# Patient Record
Sex: FEMALE | Race: BLACK OR AFRICAN AMERICAN | Employment: FULL TIME | URBAN - METROPOLITAN AREA
[De-identification: names, ages, dates, MRNs, and addresses within clinical notes are randomized per-mention and may not be internally consistent; named-entity substitution may affect disease eponyms.]

---

## 2017-12-14 ENCOUNTER — OFFICE VISIT (OUTPATIENT)
Dept: ORTHOPEDIC SURGERY | Facility: CLINIC | Age: 42
End: 2017-12-14

## 2017-12-14 VITALS
DIASTOLIC BLOOD PRESSURE: 112 MMHG | SYSTOLIC BLOOD PRESSURE: 195 MMHG | TEMPERATURE: 98.2 F | BODY MASS INDEX: 32.82 KG/M2 | OXYGEN SATURATION: 100 % | WEIGHT: 162.8 LBS | HEART RATE: 79 BPM | HEIGHT: 59 IN | RESPIRATION RATE: 18 BRPM

## 2017-12-14 DIAGNOSIS — S52.044A CLOSED NONDISPLACED FRACTURE OF CORONOID PROCESS OF RIGHT ULNA, INITIAL ENCOUNTER: Primary | ICD-10-CM

## 2017-12-14 RX ORDER — TRAMADOL HYDROCHLORIDE 50 MG/1
50 TABLET ORAL
Qty: 50 TAB | Refills: 0 | Status: SHIPPED | OUTPATIENT
Start: 2017-12-14 | End: 2018-03-01

## 2017-12-14 RX ORDER — ZOLPIDEM TARTRATE 5 MG/1
TABLET ORAL
COMMUNITY
End: 2018-03-01

## 2017-12-14 RX ORDER — HYDROCODONE BITARTRATE AND ACETAMINOPHEN 5; 325 MG/1; MG/1
TABLET ORAL
COMMUNITY
End: 2018-03-01

## 2017-12-14 NOTE — PROGRESS NOTES
Patient: Narinder Ruth                MRN: [id number]       SSN: [id number]  YOB: 1975        AGE: 36 y.o. SEX: female  Body mass index is 32.88 kg/(m^2). PCP: No primary care provider on file. 12/14/17    CC: Right elbow injury    HPI: Kitty Marcial is a 36year old right handed female who presents for evaluation of her right elbow. On 12/6/17 she slipped while in the shower and fell onto her right side. She hurt her right elbow and says she felt a \"pop\". She doesn't think that her elbow dislocated, but she felt it pop back in as she moved it. She felt ok at the time, but the next day she noted increased pain and went to the ER where x rays were taken and she was placed into a sling/splint. She has been wearing that since the ER visit. She has not been using her right arm. She reports some numbness/tingling in her right long and ring finger since the fall. She has no prior problems with her right elbow. She does have a history of right shoulder surgery in [place], [place]. History reviewed. No pertinent past medical history. Past Surgical History:   Procedure Laterality Date    PLASTIC SURGERY, NECK      [place] ANESTH,SURGERY OF SHOULDER         History reviewed. No pertinent family history. Social History     Social History    Marital status: SINGLE     Spouse name: N/A    Number of children: N/A    Years of education: N/A     Occupational History    Not on file. Social History Main Topics    Smoking status: Never Smoker    Smokeless tobacco: Never Used    Alcohol use No    Drug use: No    Sexual activity: Not on file     Other Topics Concern    Not on file     Social History Narrative    No narrative on file       Current Outpatient Prescriptions   Medication Sig Dispense Refill    HYDROcodone-acetaminophen (NORCO) 5-325 mg per tablet Take  by mouth.  zolpidem (AMBIEN) 5 mg tablet Take  by mouth nightly as needed for Sleep.       traMADol (ULTRAM) 50 mg tablet Take 1 Tab by mouth every six (6) hours as needed for Pain. Max Daily Amount: 200 mg. 50 Tab 0       No Known Allergies      REVIEW OF SYSTEMS:      CON: negative for recent weight loss/gain, fever, or chills  EYE: negative for double or blurry vision  ENT: negative for hoarseness  RS:   negative for cough, URI, SOB  CV:  negative for chest pain, palpitations  GI:    negative for blood in stool, nausea/vomiting  :  negative for blood in urine  MS: As per HPI  Other systems reviewed and noted below. PHYSICAL EXAMINATION:  Visit Vitals    BP (!) 195/112    Pulse 79    Temp 98.2 °F (36.8 °C) (Oral)    Resp 18    Ht 4' 11\" (1.499 m)    Wt 162 lb 12.8 oz (73.8 kg)    SpO2 100%    BMI 32.88 kg/m2       GENERAL: Alert and oriented x3, in no acute distress, well-developed, well-nourished. HEENT: Normocephalic, atraumatic. RESP: Non labored breathing with equal chest rise on inspiration. CV: Well perfused extremities. No cyanosis or clubbing noted. ABDOMEN: Soft, non-tender, non-distended. Neck: Healed incision. Full ROM of the neck without pain. MS: Right elbow in splint/sling. Taken down on exam.  Passive extension to 20 degrees, flexion to 100 degrees, pronosupination 60 degrees on the right elbow. Pain at limits of extension. Mild ecchymosis about the anterior elbow. TTP over medial and lateral sides of elbow on lateral epicondyle and medial epicondyle. Instability not tested on exam.  SILT/NVI distally. R/U/M motor intact. 2+ radial/ulnar pulse. Radiology: X rays from ER visit on 12/7/17 reviewed in the office which show possible coronoid tip fracture, nondisplaced. No other acute bony abnormalities noted. Impression/Plan: En Herrera has a right elbow injury, possible a subluxation based on her radiographs. I have recommended that she come out of her sling and splint at this time and work on gentle motion exercises and light use of her arm.   My concern is that her elbow will get stiff if she remains immobilized any longer. We discussed the risk of dislocation, which I think is low. I would like to see her back in 2 weeks with repeat x rays to check her progress. All of her questions were answered.          Electronically signed by: Rodri Landa MD

## 2017-12-14 NOTE — LETTER
NOTIFICATION RETURN TO WORK / SCHOOL 
 
12/14/2017 3:59 PM 
 
Ms. Dereck Michael [address] [address] To Whom It May Concern: 
 
Dereck Michael is currently under the care of [address] [place] [address]. She was seen in our office today due to her shoulder injury she sustained last week. Please excuse her absence from work today. If there are questions or concerns please have the patient contact our office.  
 
 
 
Sincerely, 
 
 
Allen Bennett MD

## 2017-12-14 NOTE — PATIENT INSTRUCTIONS
Dislocated Elbow: Care Instructions  Your Care Instructions  Your elbow may get forced out of its normal position (dislocated) if you fall on it or jar it hard. An elbow that is dislocated causes pain from the elbow to the hand. The doctor put your elbow back in its normal position. But you will still need to be careful, as it can more easily go out of position again. Rest and home treatment can help you heal. Your doctor probably put a splint on your elbow to keep it in position while it heals. He or she may advise physical therapy to help you exercise your elbow and keep it flexible. If you injured muscles or tendons, you may need more treatment or surgery. You may have had a sedative to help you relax. You may be unsteady after having sedation. It can take a few hours for the medicine's effects to wear off. Common side effects of sedation include nausea, vomiting, and feeling sleepy or tired. The doctor has checked you carefully, but problems can develop later. If you notice any problems or new symptoms, get medical treatment right away. Follow-up care is a key part of your treatment and safety. Be sure to make and go to all appointments, and call your doctor if you are having problems. It's also a good idea to know your test results and keep a list of the medicines you take. How can you care for yourself at home? · If the doctor gave you a sedative:  ¨ For 24 hours, don't do anything that requires attention to detail. It takes time for the medicine's effects to completely wear off. ¨ For your safety, do not drive or operate any machinery that could be dangerous. Wait until the medicine wears off and you can think clearly and react easily. · If your doctor put a splint on your elbow, wear the splint as directed. Do not remove it until your doctor says you can. While wearing a splint, wiggle your uninjured fingers, make a fist, or squeeze a soft ball to reduce swelling and stiffness.   · If you have an elastic bandage, make sure it is snug but not so tight that your arm gets numb or tingles. You can loosen the bandage if it is too tight or your hand swells. · Prop up your elbow on a pillow when you ice it or anytime you sit or lie down during the next 3 days. Try to keep it above the level of your heart. This will help reduce swelling. · Rest your arm as much as you can. You may need to change your activities to avoid movements that irritate the elbow. · If your elbow is swollen, put ice or a cold pack on it for 10 to 20 minutes at a time. Try to do this every 1 to 2 hours for the next 3 days (when you are awake) or until the swelling goes down. Put a thin cloth between the ice and your skin. · Take your medicines exactly as prescribed. Call your doctor if you think you are having a problem with your medicine. · Ask your doctor if you can take an over-the-counter pain medicine, such as acetaminophen (Tylenol), ibuprofen (Advil, Motrin), or naproxen (Aleve). Be safe with medicines. Read and follow all instructions on the label. · Do not give aspirin to anyone younger than 20. It has been linked to Reye syndrome, a serious illness. · If your doctor recommends exercises, do them as directed. When should you call for help? Call 911 anytime you think you may need emergency care. For example, call if:  ? · You have trouble breathing. ? · You passed out (lost consciousness). ?Call your doctor now or seek immediate medical care if:  ? · You have new or worse nausea or vomiting. ? · You have new or worse pain. ? · Your hand or fingers are cool or pale or change color. ? · Your cast or splint feels too tight. ? · You have tingling, weakness, or numbness in your hand or fingers. ? Watch closely for changes in your health, and be sure to contact your doctor if:  ? · You have problems with your cast or splint. ? · You do not get better as expected. Where can you learn more?   Go to http://bashir-princess.info/. Enter O291 in the search box to learn more about \"Dislocated Elbow: Care Instructions. \"  Current as of: March 21, 2017  Content Version: 11.4  © 3564-4677 [employer], [employer]. Care instructions adapted under license by [employer] (which disclaims liability or warranty for this information). If you have questions about a medical condition or this instruction, always ask your healthcare professional. [address] [address] any warranty or liability for your use of this information.

## 2017-12-20 ENCOUNTER — OFFICE VISIT (OUTPATIENT)
Dept: ORTHOPEDIC SURGERY | Facility: CLINIC | Age: 42
End: 2017-12-20

## 2017-12-20 VITALS
BODY MASS INDEX: 33.43 KG/M2 | SYSTOLIC BLOOD PRESSURE: 158 MMHG | HEIGHT: 59 IN | OXYGEN SATURATION: 100 % | DIASTOLIC BLOOD PRESSURE: 95 MMHG | TEMPERATURE: 97.4 F | WEIGHT: 165.8 LBS | RESPIRATION RATE: 18 BRPM | HEART RATE: 82 BPM

## 2017-12-20 DIAGNOSIS — S52.044D CLOSED NONDISPLACED FRACTURE OF CORONOID PROCESS OF RIGHT ULNA WITH ROUTINE HEALING, SUBSEQUENT ENCOUNTER: ICD-10-CM

## 2017-12-20 DIAGNOSIS — M25.521 RIGHT ELBOW PAIN: Primary | ICD-10-CM

## 2017-12-20 RX ORDER — ACETAMINOPHEN AND CODEINE PHOSPHATE 300; 30 MG/1; MG/1
1 TABLET ORAL
Qty: 30 TAB | Refills: 0 | Status: SHIPPED | OUTPATIENT
Start: 2017-12-20 | End: 2018-03-01

## 2017-12-20 NOTE — PROGRESS NOTES
Patient: Merritt Fu                MRN: [id number]       SSN: [id number]  YOB: 1975        AGE: 36 y.o. SEX: female  Body mass index is 33.49 kg/(m^2). PCP: No primary care provider on file. 12/20/17    HPI: Soni Fernandez returns today for her right elbow. She was actually doing well until last night when she noted increased pain in her right elbow as well as numbness and tingling in her hand as of this morning. She comes in for an evaluation again of her elbow. No other injury or trauma. She has been working on the movement and stated that her motion had improved. No other complaints. No neck pain complaints. History reviewed. No pertinent past medical history. Past Surgical History:   Procedure Laterality Date    PLASTIC SURGERY, NECK      [place] ANESTH,SURGERY OF SHOULDER         History reviewed. No pertinent family history. Social History     Social History    Marital status: SINGLE     Spouse name: N/A    Number of children: N/A    Years of education: N/A     Occupational History    Not on file. Social History Main Topics    Smoking status: Never Smoker    Smokeless tobacco: Never Used    Alcohol use No    Drug use: No    Sexual activity: Not on file     Other Topics Concern    Not on file     Social History Narrative       Current Outpatient Prescriptions   Medication Sig Dispense Refill    acetaminophen-codeine (TYLENOL-CODEINE #3) 300-30 mg per tablet Take 1 Tab by mouth every four (4) hours as needed for Pain. Max Daily Amount: 6 Tabs. 30 Tab 0    zolpidem (AMBIEN) 5 mg tablet Take  by mouth nightly as needed for Sleep.  traMADol (ULTRAM) 50 mg tablet Take 1 Tab by mouth every six (6) hours as needed for Pain. Max Daily Amount: 200 mg. 50 Tab 0    HYDROcodone-acetaminophen (NORCO) 5-325 mg per tablet Take  by mouth.          No Known Allergies      REVIEW OF SYSTEMS:      CON: negative for recent weight loss/gain, fever, or chills  EYE: negative for double or blurry vision  ENT: negative for hoarseness  RS:   negative for cough, URI, SOB  CV:  negative for chest pain, palpitations  GI:    negative for blood in stool, nausea/vomiting  :  negative for blood in urine  MS: As per HPI  Other systems reviewed and noted below. PHYSICAL EXAMINATION:  Visit Vitals    BP (!) 158/95    Pulse 82    Temp 97.4 °F (36.3 °C) (Oral)    Resp 18    Ht 4' 11\" (1.499 m)    Wt 165 lb 12.8 oz (75.2 kg)    SpO2 100%    BMI 33.49 kg/m2       GENERAL: Alert and oriented x3, in no acute distress, well-developed, well-nourished. HEENT: Normocephalic, atraumatic. RESP: Non labored breathing with equal chest rise on inspiration. CV: Well perfused extremities. No cyanosis or clubbing noted. ABDOMEN: Soft, non-tender, non-distended. MS: Right elbow examination unchanged from previous visit. Motion limited by pain. Pronosupination 60 degrees, flexion to 90, extension to 30. TTP globally about the elbow. SILT R/U/ distally. AIN/PIN/Ulnar motor intact. Radiology: 3 views of the right elbow taken today show resorption at the olecranon fracture site. No other bony injury noted, no new bony changes noted. Impression/Plan: Franko Palumbo has right elbow pain from her previous injury that has worsened. I do not see any obvious changes on her imaging at today's visit. I have recommended that she continue with her treatment symptomatically. I have prescribed her Tylenol #3 for her pain. Follow up in 1 week for a repeat examination.         Electronically signed by: Lexx Mcgregor MD

## 2017-12-20 NOTE — LETTER
NOTIFICATION RETURN TO WORK / SCHOOL 
 
12/20/2017 4:08 PM 
 
Ms. Polina Alves [address] To Whom It May Concern: 
 
Polina Alves is currently under the care of [address] [place] [address]. She will return to work/school on: 12/21/17 If there are questions or concerns please have the patient contact our office.  
 
 
 
Sincerely, 
 
 
Abdulkadir Dugan MD

## 2017-12-20 NOTE — PATIENT INSTRUCTIONS
Broken Elbow: Care Instructions  Your Care Instructions  A fractured elbow means that a bone has broken in or near the joint. Broken bones (fractures) can range from a small, hairline crack, to a bone or bones broken into two or more pieces. Your treatment depends on how bad the break is. Your doctor may have put your arm in a cast or splint to allow your elbow to heal or to keep it stable until you see another doctor. You also may wear a sling to help support your arm. It may take weeks or months for your elbow to heal. You can help it heal with some care at home. You heal best when you take good care of yourself. Eat a variety of healthy foods, and don't smoke. You may have had a sedative to help you relax. You may be unsteady after having sedation. It can take a few hours for the medicine's effects to wear off. Common side effects of sedation include nausea, vomiting, and feeling sleepy or tired. The doctor has checked you carefully, but problems can develop later. If you notice any problems or new symptoms, get medical treatment right away. Follow-up care is a key part of your treatment and safety. Be sure to make and go to all appointments, and call your doctor if you are having problems. It's also a good idea to know your test results and keep a list of the medicines you take. How can you care for yourself at home? · If the doctor gave you a sedative:  ¨ For 24 hours, don't do anything that requires attention to detail. It takes time for the medicine's effects to completely wear off. ¨ For your safety, do not drive or operate any machinery that could be dangerous. Wait until the medicine wears off and you can think clearly and react easily. · Put ice or a cold pack on your arm for 10 to 20 minutes at a time. Try to do this every 1 to 2 hours for the next 3 days (when you are awake). Put a thin cloth between the ice and your cast or splint. Keep your cast or splint dry.   · Follow the cast care instructions your doctor gives you. If you have a splint, do not take it off unless your doctor tells you to. · Be safe with medicines. Take pain medicines exactly as directed. ¨ If the doctor gave you a prescription medicine for pain, take it as prescribed. ¨ If you are not taking a prescription pain medicine, ask your doctor if you can take an over-the-counter medicine. · Prop up your arm on pillows when you sit or lie down in the first few days after the injury. Keep your elbow higher than the level of your heart. This will help reduce swelling. · Follow instructions for exercises to keep your arm strong. · Wiggle your fingers and wrist often to reduce swelling and stiffness. When should you call for help? Call 911 anytime you think you may need emergency care. For example, call if:  ? · You are very sleepy and you have trouble waking up. ?Call your doctor now or seek immediate medical care if:  ? · You have new or worse nausea or vomiting. ? · You have new or worse pain. ? · Your hand or fingers are cool or pale or change color. ? · Your cast or splint feels too tight. ? · You have tingling, weakness, or numbness in your hand or fingers. ? Watch closely for changes in your health, and be sure to contact your doctor if:  ? · You do not get better as expected. ? · You have problems with your cast or splint. Where can you learn more? Go to http://bashir-princess.info/. Enter O468 in the search box to learn more about \"Broken Elbow: Care Instructions. \"  Current as of: March 21, 2017  Content Version: 11.4  © 8476-7364 [employer]. Care instructions adapted under license by [employer] (which disclaims liability or warranty for this information). If you have questions about a medical condition or this instruction, always ask your healthcare professional. [address] any warranty or liability for your use of this information.

## 2017-12-28 ENCOUNTER — OFFICE VISIT (OUTPATIENT)
Dept: ORTHOPEDIC SURGERY | Facility: CLINIC | Age: 42
End: 2017-12-28

## 2017-12-28 VITALS
DIASTOLIC BLOOD PRESSURE: 86 MMHG | HEART RATE: 86 BPM | BODY MASS INDEX: 33.75 KG/M2 | RESPIRATION RATE: 18 BRPM | TEMPERATURE: 96.7 F | WEIGHT: 167.4 LBS | HEIGHT: 59 IN | OXYGEN SATURATION: 100 % | SYSTOLIC BLOOD PRESSURE: 144 MMHG

## 2017-12-28 DIAGNOSIS — S52.044D CLOSED NONDISPLACED FRACTURE OF CORONOID PROCESS OF RIGHT ULNA WITH ROUTINE HEALING, SUBSEQUENT ENCOUNTER: Primary | ICD-10-CM

## 2017-12-28 DIAGNOSIS — G56.21 NEURITIS OF RIGHT ULNAR NERVE: ICD-10-CM

## 2017-12-28 NOTE — PROGRESS NOTES
Patient: Dereck Michael                MRN: [id number]       SSN: [id number]  YOB: 1975        AGE: 36 y.o. SEX: female  Body mass index is 33.81 kg/(m^2). PCP: No primary care provider on file. 12/28/17    CC: Follow up right elbow injury    HPI: Marcio Pate returns today for her right elbow. He elbow is improving in terms of her motion and pain since her last visit. However, she is having some issues with her right ulnar nerve. She is getting symptoms shooting to her small and ring finger with any pressure on the nerve when resting her arm. It is sensitive to the touch. She denies any weakness in the right arm. She is using the arm/elbow for light activities. History reviewed. No pertinent past medical history. Past Surgical History:   Procedure Laterality Date    PLASTIC SURGERY, NECK      [place] ANESTH,SURGERY OF SHOULDER         History reviewed. No pertinent family history. Social History     Social History    Marital status: SINGLE     Spouse name: N/A    Number of children: N/A    Years of education: N/A     Occupational History    Not on file. Social History Main Topics    Smoking status: Never Smoker    Smokeless tobacco: Never Used    Alcohol use No    Drug use: No    Sexual activity: Not on file     Other Topics Concern    Not on file     Social History Narrative       Current Outpatient Prescriptions   Medication Sig Dispense Refill    acetaminophen-codeine (TYLENOL-CODEINE #3) 300-30 mg per tablet Take 1 Tab by mouth every four (4) hours as needed for Pain. Max Daily Amount: 6 Tabs. 30 Tab 0    zolpidem (AMBIEN) 5 mg tablet Take  by mouth nightly as needed for Sleep.  HYDROcodone-acetaminophen (NORCO) 5-325 mg per tablet Take  by mouth.  traMADol (ULTRAM) 50 mg tablet Take 1 Tab by mouth every six (6) hours as needed for Pain.  Max Daily Amount: 200 mg. 50 Tab 0       No Known Allergies      REVIEW OF SYSTEMS:      CON: negative for recent weight loss/gain, fever, or chills  EYE: negative for double or blurry vision  ENT: negative for hoarseness  RS:   negative for cough, URI, SOB  CV:  negative for chest pain, palpitations  GI:    negative for blood in stool, nausea/vomiting  :  negative for blood in urine  MS: As per HPI  Other systems reviewed and noted below. PHYSICAL EXAMINATION:  Visit Vitals    /86    Pulse 86    Temp 96.7 °F (35.9 °C) (Oral)    Resp 18    Ht 4' 11\" (1.499 m)    Wt 167 lb 6.4 oz (75.9 kg)    SpO2 100%    BMI 33.81 kg/m2       GENERAL: Alert and oriented x3, in no acute distress, well-developed, well-nourished. HEENT: Normocephalic, atraumatic. RESP: Non labored breathing with equal chest rise on inspiration. CV: Well perfused extremities. No cyanosis or clubbing noted. ABDOMEN: Soft, non-tender, non-distended. MS: Right elbow with  degrees ROM. Full pronosupination. SILT distally. TTP globally about the elbow.  + Tinnels over cubital tunnel. Radiology: None taken today    Impression/Plan: Nadia's pain and motion are improving from her right elbow fracture, but her ulnar nerve is hypersensitive at this time. I recommended continuing with monitoring as this is likely a neuropraxia and will resolve. She can use a padded compressive elbow sleeve over the counter once her ulnar nerve is not as sensitive. Follow up in 2 weeks.         Electronically signed by: Melba Fierro MD

## 2017-12-28 NOTE — PATIENT INSTRUCTIONS
Ulnar Neuropathy (Handlebar Palsy): Exercises  Your Care Instructions  Here are some examples of typical rehabilitation exercises for your condition. Start each exercise slowly. Ease off the exercise if you start to have pain. Your doctor or your physical or occupational therapist will tell you when you can start these exercises and which ones will work best for you. How to do the exercises  Neck rotation    1. Sit in a firm chair, or stand up straight. 2. Keeping your chin level, turn your head to the right, and hold for 15 to 30 seconds. 3. Turn your head to the left, and hold for 15 to 30 seconds. 4. Repeat 2 to 4 times to each side. Shoulder blade squeeze    1. While standing with your arms at your sides, squeeze your shoulder blades together. Do not raise your shoulders as you are squeezing. 2. Hold for 6 seconds. 3. Repeat 8 to 12 times. Neck stretches    1. Look straight ahead, and tip your right ear to your right shoulder. Do not let your left shoulder rise as you tip your head to the right. 2. Hold for 15 to 30 seconds. 3. Tilt your head to the left. Do not let your right shoulder rise as you tip your head to the left. 4. Hold for 15 to 30 seconds. 5. Repeat 2 to 4 times to each side. Elbow flexion and extension    If this exercise causes numbness, tingling, or pain in your hand, ease off of the stretch. You should not have symptoms as you stretch. If you cannot back off enough so that you can do the exercise without symptoms, stop doing the exercise right away. 1. Stand with your arms relaxed at your sides. 2. With your affected arm, gently bend your elbow up toward you as far as possible. 3. Then straighten your arm as much as you can. 4. Repeat 2 to 4 times. Wrist flexor stretch    If this exercise causes numbness, tingling, or pain in your hand, ease off of the stretch. You should not have symptoms as you stretch.  If you cannot back off enough so that you can do the exercise without symptoms, stop doing the exercise right away. 1. Extend your affected arm in front of you with your palm facing away from your body. 2. Bend back your wrist on your affected arm, pointing your hand up toward the ceiling. 3. With your other hand, gently bend your wrist farther until you feel a mild to moderate stretch in your forearm. 4. Hold for at least 15 to 30 seconds. 5. Repeat 2 to 4 times. 6. Repeat steps 1 through 5, but this time extend your affected arm in front of you with your palm facing up. Then bend back your wrist, pointing your hand toward the floor. Wrist flexion and extension    If this exercise causes numbness, tingling, or pain in your hand, ease off of the stretch. You should not have symptoms as you stretch. If you cannot back off enough so that you can do the exercise without symptoms, stop doing the exercise right away. 1. Place your forearm on a table, with your affected hand and wrist extended beyond the table, palm down. 2. Slowly bend your wrist to move your hand upward and allow your hand to close into a fist. Hold for about 6 seconds. 3. Then lower your hand and allow your fingers to relax. Hold this position for about 6 seconds. You should feel a gentle stretch. 4. Repeat 8 to 12 times. Follow-up care is a key part of your treatment and safety. Be sure to make and go to all appointments, and call your doctor if you are having problems. It's also a good idea to know your test results and keep a list of the medicines you take. Where can you learn more? Go to http://bashir-princess.info/. Enter G500 in the search box to learn more about \"Ulnar Neuropathy (Handlebar Palsy): Exercises. \"  Current as of: March 21, 2017  Content Version: 11.4  © 5162-2712 [employer]. Care instructions adapted under license by [employer] (which disclaims liability or warranty for this information).  If you have questions about a medical condition or this instruction, always ask your healthcare professional. [place] [address] any warranty or liability for your use of this information.

## 2018-01-12 ENCOUNTER — OFFICE VISIT (OUTPATIENT)
Dept: ORTHOPEDIC SURGERY | Facility: CLINIC | Age: 43
End: 2018-01-12

## 2018-01-12 VITALS
DIASTOLIC BLOOD PRESSURE: 84 MMHG | WEIGHT: 170 LBS | BODY MASS INDEX: 34.27 KG/M2 | HEART RATE: 75 BPM | TEMPERATURE: 96.7 F | HEIGHT: 59 IN | OXYGEN SATURATION: 98 % | SYSTOLIC BLOOD PRESSURE: 137 MMHG

## 2018-01-12 DIAGNOSIS — S52.044D CLOSED NONDISPLACED FRACTURE OF CORONOID PROCESS OF RIGHT ULNA WITH ROUTINE HEALING, SUBSEQUENT ENCOUNTER: Primary | ICD-10-CM

## 2018-01-12 DIAGNOSIS — G56.21 NEURITIS OF RIGHT ULNAR NERVE: ICD-10-CM

## 2018-01-12 NOTE — LETTER
NOTIFICATION RETURN TO WORK / SCHOOL 
 
1/12/2018 4:15 PM 
 
Ms. Dre Sarmiento [address] [place], [place] [id number] To Whom It May Concern: 
 
Dre Sarmiento is currently under the care of [address] [place] [address]. She was seen in our office today due to her right elbow pain. Please excuse her absence from work today. If there are questions or concerns please have the patient contact our office.  
 
 
 
Sincerely, 
 
 
Dannielle Lagunas MD

## 2018-01-12 NOTE — LETTER
NOTIFICATION RETURN TO  SCHOOL 
 
1/16/2018 10:06 AM 
 
Ms. Jori Bob [address] [place] [address] To Whom It May Concern: 
 
Jori Bob is currently under the care of [address] [place] [address]. She is under my care for a recent right elbow injury involving a fracture of the right elbow as well as an ulnar nerve injury. Due to these injuries, she needs to be accommodated to work or study within her tolerance of symptoms. Her symptoms from the injury including pain, stiffness and numbness/tingling continue at this time. If there are questions or concerns please have the patient contact our office.  
 
 
 
Sincerely, 
 
 
Mariya Spivey MD

## 2018-01-13 NOTE — PATIENT INSTRUCTIONS
Ulnar Neuropathy (Handlebar Palsy): Exercises  Your Care Instructions  Here are some examples of typical rehabilitation exercises for your condition. Start each exercise slowly. Ease off the exercise if you start to have pain. Your doctor or your physical or occupational therapist will tell you when you can start these exercises and which ones will work best for you. How to do the exercises  Neck rotation    1. Sit in a firm chair, or stand up straight. 2. Keeping your chin level, turn your head to the right, and hold for 15 to 30 seconds. 3. Turn your head to the left, and hold for 15 to 30 seconds. 4. Repeat 2 to 4 times to each side. Shoulder blade squeeze    1. While standing with your arms at your sides, squeeze your shoulder blades together. Do not raise your shoulders as you are squeezing. 2. Hold for 6 seconds. 3. Repeat 8 to 12 times. Neck stretches    1. Look straight ahead, and tip your right ear to your right shoulder. Do not let your left shoulder rise as you tip your head to the right. 2. Hold for 15 to 30 seconds. 3. Tilt your head to the left. Do not let your right shoulder rise as you tip your head to the left. 4. Hold for 15 to 30 seconds. 5. Repeat 2 to 4 times to each side. Elbow flexion and extension    If this exercise causes numbness, tingling, or pain in your hand, ease off of the stretch. You should not have symptoms as you stretch. If you cannot back off enough so that you can do the exercise without symptoms, stop doing the exercise right away. 1. Stand with your arms relaxed at your sides. 2. With your affected arm, gently bend your elbow up toward you as far as possible. 3. Then straighten your arm as much as you can. 4. Repeat 2 to 4 times. Wrist flexor stretch    If this exercise causes numbness, tingling, or pain in your hand, ease off of the stretch. You should not have symptoms as you stretch.  If you cannot back off enough so that you can do the exercise without symptoms, stop doing the exercise right away. 1. Extend your affected arm in front of you with your palm facing away from your body. 2. Bend back your wrist on your affected arm, pointing your hand up toward the ceiling. 3. With your other hand, gently bend your wrist farther until you feel a mild to moderate stretch in your forearm. 4. Hold for at least 15 to 30 seconds. 5. Repeat 2 to 4 times. 6. Repeat steps 1 through 5, but this time extend your affected arm in front of you with your palm facing up. Then bend back your wrist, pointing your hand toward the floor. Wrist flexion and extension    If this exercise causes numbness, tingling, or pain in your hand, ease off of the stretch. You should not have symptoms as you stretch. If you cannot back off enough so that you can do the exercise without symptoms, stop doing the exercise right away. 1. Place your forearm on a table, with your affected hand and wrist extended beyond the table, palm down. 2. Slowly bend your wrist to move your hand upward and allow your hand to close into a fist. Hold for about 6 seconds. 3. Then lower your hand and allow your fingers to relax. Hold this position for about 6 seconds. You should feel a gentle stretch. 4. Repeat 8 to 12 times. Follow-up care is a key part of your treatment and safety. Be sure to make and go to all appointments, and call your doctor if you are having problems. It's also a good idea to know your test results and keep a list of the medicines you take. Where can you learn more? Go to http://bashir-princess.info/. Enter Q719 in the search box to learn more about \"Ulnar Neuropathy (Handlebar Palsy): Exercises. \"  Current as of: March 21, 2017  Content Version: 11.4  © 2697-8322 [employer]. Care instructions adapted under license by [employer] (which disclaims liability or warranty for this information).  If you have questions about a medical condition or this instruction, always ask your healthcare professional. [place] [address] any warranty or liability for your use of this information.

## 2018-01-13 NOTE — PROGRESS NOTES
Patient: Schuyler Jama                MRN: [id number]       SSN: [id number]  YOB: 1975        AGE: 36 y.o. SEX: female  Body mass index is 34.34 kg/(m^2). PCP: No primary care provider on file. 01/12/18    Chief Complaint: No changes from previous visit complaint. HPI: Schuyler Jama returns today for a follow up appointment for her recent right elbow injury. Fortunately, since her last visit, her right elbow motion has improved greatly along with most of her elbow pain. She continues to have symptoms from her ulnar neuritis and that is her only complaint today. She notes nerve irritability with resting her arm while typing on the computer, with any pressure or impact around her cubital tunnel and with symptoms at night when sleeping, actually waking her up from sleep. History reviewed. No pertinent past medical history. History reviewed. No pertinent family history. Current Outpatient Prescriptions   Medication Sig Dispense Refill    zolpidem (AMBIEN) 5 mg tablet Take  by mouth nightly as needed for Sleep.  acetaminophen-codeine (TYLENOL-CODEINE #3) 300-30 mg per tablet Take 1 Tab by mouth every four (4) hours as needed for Pain. Max Daily Amount: 6 Tabs. 30 Tab 0    HYDROcodone-acetaminophen (NORCO) 5-325 mg per tablet Take  by mouth.  traMADol (ULTRAM) 50 mg tablet Take 1 Tab by mouth every six (6) hours as needed for Pain. Max Daily Amount: 200 mg. 50 Tab 0       No Known Allergies    Past Surgical History:   Procedure Laterality Date    PLASTIC SURGERY, NECK      [place] ANESTH,SURGERY OF SHOULDER         Social History     Social History    Marital status: SINGLE     Spouse name: N/A    Number of children: N/A    Years of education: N/A     Occupational History    Not on file.      Social History Main Topics    Smoking status: Never Smoker    Smokeless tobacco: Never Used    Alcohol use No    Drug use: No    Sexual activity: Not on file Other Topics Concern    Not on file     Social History Narrative       REVIEW OF SYSTEMS:      No changes from previous review of systems unless noted. PHYSICAL EXAMINATION:  GENERAL: Alert and oriented x3, in no acute distress. HEENT: Normocephalic, atraumatic. RESP: Non labored breathing. SKIN: No rashes or lesions noted. MUSCULOSKELETAL: Right elbow with no swelling. Full elbow ROM with minimal pain flexion/extension/pronosupination.  + Tinnels over cubital tunnel. Ulnar motor intact distally. AIN/PIN intact. Nontender over medial and lateral epicondyles. Radiology: None taken today    Impression/Plan:   1. Right elbow injury: Her pain and motion have improved, but her ulnar nerve remains hypersensitive to stimuli and her ulnar neuritis from her recent trauma is still present. I have recommended conservative treatment for now. We discussed keeping the elbow extended during sleep and using a pad to protect the nerve while at work. I will see her back in 3-4 weeks for another check on her nerve.        Electronically signed by: Dereck Parnell MD

## 2018-01-16 ENCOUNTER — TELEPHONE (OUTPATIENT)
Dept: ORTHOPEDIC SURGERY | Facility: CLINIC | Age: 43
End: 2018-01-16

## 2018-01-16 NOTE — TELEPHONE ENCOUNTER
Patient is calling to request a letter of documentation stating that patient is still under Dr. Joseph Talley care and the diagnosis of patient's injury. She is able to get disability from [employer] for her class work and having to type and over act that elbow. Patient can be reached [phone] and is asking that it is faxed to her [facility] (BARBARA GUZMAN) [phone].

## 2018-02-02 ENCOUNTER — OFFICE VISIT (OUTPATIENT)
Dept: ORTHOPEDIC SURGERY | Facility: CLINIC | Age: 43
End: 2018-02-02

## 2018-02-02 VITALS
RESPIRATION RATE: 78 BRPM | SYSTOLIC BLOOD PRESSURE: 144 MMHG | HEIGHT: 59 IN | OXYGEN SATURATION: 100 % | TEMPERATURE: 96.6 F | DIASTOLIC BLOOD PRESSURE: 86 MMHG | HEART RATE: 78 BPM | WEIGHT: 176.4 LBS | BODY MASS INDEX: 35.56 KG/M2

## 2018-02-02 DIAGNOSIS — G56.21 NEURITIS OF RIGHT ULNAR NERVE: Primary | ICD-10-CM

## 2018-02-02 NOTE — PROGRESS NOTES
Patient: Cain Tran                MRN: [id number]       SSN: [id number]  YOB: 1975        AGE: 36 y.o. SEX: female  Body mass index is 35.63 kg/(m^2). PCP: No primary care provider on file. 02/02/18      HISTORY OF PRESENT ILLNESS: Ms. Nayeli Sparks returns today for follow-up of her right elbow injury, as well as her right ulnar neuritis. She reports that her symptoms are much better. She still does have some irritation of the ulnar nerve at the elbow, but she has been wearing a padded elbow sleeve and really protecting the elbow which has helped a lot. She really has minimal complaints today. She states at the end of a long day or if she sleeps on her elbow funny she will get some numbness and tingling and symptoms in her ulnar nerve distribution, but overall she is happy with the progress she has made up to this point. She has no stiffness in the elbow to report. History reviewed. No pertinent past medical history. History reviewed. No pertinent family history. Current Outpatient Prescriptions   Medication Sig Dispense Refill    zolpidem (AMBIEN) 5 mg tablet Take  by mouth nightly as needed for Sleep.  acetaminophen-codeine (TYLENOL-CODEINE #3) 300-30 mg per tablet Take 1 Tab by mouth every four (4) hours as needed for Pain. Max Daily Amount: 6 Tabs. 30 Tab 0    HYDROcodone-acetaminophen (NORCO) 5-325 mg per tablet Take  by mouth.  traMADol (ULTRAM) 50 mg tablet Take 1 Tab by mouth every six (6) hours as needed for Pain. Max Daily Amount: 200 mg. 50 Tab 0       No Known Allergies    Past Surgical History:   Procedure Laterality Date    PLASTIC SURGERY, NECK      [place] ANESTH,SURGERY OF SHOULDER         Social History     Social History    Marital status: SINGLE     Spouse name: N/A    Number of children: N/A    Years of education: N/A     Occupational History    Not on file.      Social History Main Topics    Smoking status: Never Smoker    Smokeless tobacco: Never Used    Alcohol use No    Drug use: No    Sexual activity: Not on file     Other Topics Concern    Not on file     Social History Narrative       REVIEW OF SYSTEMS:      No changes from previous review of systems unless noted. PHYSICAL EXAMINATION:  Visit Vitals    /86    Pulse 78    Temp 96.6 °F (35.9 °C) (Oral)    Resp (!) 78    Ht 4' 11\" (1.499 m)    Wt 176 lb 6.4 oz (80 kg)    SpO2 100%    BMI 35.63 kg/m2     Body mass index is 35.63 kg/(m^2). GENERAL: Alert and oriented x3, in no acute distress. HEENT: Normocephalic, atraumatic. RESP: Non labored breathing. SKIN: No rashes or lesions noted. PHYSICAL EXAMINATION:  Examination of the right elbow reveals full elbow range of motion. There is no deformity about the elbow. She has mild Tinels at the cubital tunnel. There is no pain with elbow range of motion with full flexion and extension as well as pronosupination. She is nontender over the lateral side of the elbow. She is nontender over the olecranon. She is neurovascularly intact distally in the radial, ulnar, and median nerve distribution, as well as AIN, PIN, and ulnar motor intact. ASSESSMENT/PLAN:  Ms. Yesenia Bradshaw is a 54-year-old female with right elbow trauma with a subsequent right elbow ulnar neuritis which is improving. I continue to recommend conservative treatment with an elbow pad, as well as avoiding any trauma to the inside of her elbow. She is much improved since I first began seeing her which is good. I would like for her to continue with the conservative treatment at this time. I anticipate a full recovery and resolution of her symptoms. If she continues to have any symptoms I will see her back as needed.           Electronically signed by: Dereck Parnell MD

## 2018-03-01 ENCOUNTER — OFFICE VISIT (OUTPATIENT)
Dept: FAMILY MEDICINE CLINIC | Age: 43
End: 2018-03-01

## 2018-03-01 VITALS
WEIGHT: 181 LBS | BODY MASS INDEX: 36.49 KG/M2 | RESPIRATION RATE: 20 BRPM | HEIGHT: 59 IN | SYSTOLIC BLOOD PRESSURE: 134 MMHG | DIASTOLIC BLOOD PRESSURE: 84 MMHG | TEMPERATURE: 98 F | HEART RATE: 77 BPM

## 2018-03-01 DIAGNOSIS — E55.9 VITAMIN D DEFICIENCY: ICD-10-CM

## 2018-03-01 DIAGNOSIS — Z76.89 ESTABLISHING CARE WITH NEW DOCTOR, ENCOUNTER FOR: Primary | ICD-10-CM

## 2018-03-01 DIAGNOSIS — R63.5 ABNORMAL WEIGHT GAIN: ICD-10-CM

## 2018-03-01 DIAGNOSIS — Z76.89 ESTABLISHING CARE WITH NEW DOCTOR, ENCOUNTER FOR: ICD-10-CM

## 2018-03-01 DIAGNOSIS — I10 ESSENTIAL HYPERTENSION: ICD-10-CM

## 2018-03-01 DIAGNOSIS — G47.00 INSOMNIA, UNSPECIFIED TYPE: ICD-10-CM

## 2018-03-01 RX ORDER — ZOLPIDEM TARTRATE 10 MG/1
TABLET ORAL
Refills: 0 | COMMUNITY
Start: 2018-01-08 | End: 2018-03-01 | Stop reason: ALTCHOICE

## 2018-03-01 RX ORDER — HYDROXYZINE 50 MG/1
TABLET, FILM COATED ORAL
Refills: 5 | COMMUNITY
Start: 2018-02-10 | End: 2019-02-11 | Stop reason: ALTCHOICE

## 2018-03-01 RX ORDER — ESZOPICLONE 3 MG/1
TABLET, FILM COATED ORAL
Refills: 0 | COMMUNITY
Start: 2018-01-16 | End: 2018-03-01 | Stop reason: ALTCHOICE

## 2018-03-01 NOTE — PROGRESS NOTES
Chief Complaint   Patient presents with    Establish Care    Hypertension    Weight Gain    Insomnia

## 2018-03-01 NOTE — LETTER
NOTIFICATION RETURN TO WORK / SCHOOL 
 
3/1/2018 9:47 AM 
 
Ms. Ivan Garrison [address] [place] [id number] To Whom It May Concern: 
 
Ivan Garrison is currently under the care of [facility] on 3/1/2018. She will return to work/school on: 3/2/2018 If there are questions or concerns please have the patient contact our office.  
 
 
 
Sincerely, 
 
 
Rosemarie Sewell NP

## 2018-03-01 NOTE — PATIENT INSTRUCTIONS
Please contact our office if you have any questions about your visit today. Body Mass Index: Care Instructions  Your Care Instructions    Body mass index (BMI) can help you see if your weight is raising your risk for health problems. It uses a formula to compare how much you weigh with how tall you are. · A BMI lower than 18.5 is considered underweight. · A BMI between 18.5 and 24.9 is considered healthy. · A BMI between 25 and 29.9 is considered overweight. A BMI of 30 or higher is considered obese. If your BMI is in the normal range, it means that you have a lower risk for weight-related health problems. If your BMI is in the overweight or obese range, you may be at increased risk for weight-related health problems, such as high blood pressure, heart disease, stroke, arthritis or joint pain, and diabetes. If your BMI is in the underweight range, you may be at increased risk for health problems such as fatigue, lower protection (immunity) against illness, muscle loss, bone loss, hair loss, and hormone problems. BMI is just one measure of your risk for weight-related health problems. You may be at higher risk for health problems if you are not active, you eat an unhealthy diet, or you drink too much alcohol or use tobacco products. Follow-up care is a key part of your treatment and safety. Be sure to make and go to all appointments, and call your doctor if you are having problems. It's also a good idea to know your test results and keep a list of the medicines you take. How can you care for yourself at home? · Practice healthy eating habits. This includes eating plenty of fruits, vegetables, whole grains, lean protein, and low-fat dairy. · If your doctor recommends it, get more exercise. Walking is a good choice. Bit by bit, increase the amount you walk every day. Try for at least 30 minutes on most days of the week. · Do not smoke. Smoking can increase your risk for health problems.  If you need help quitting, talk to your doctor about stop-smoking programs and medicines. These can increase your chances of quitting for good. · Limit alcohol to 2 drinks a day for men and 1 drink a day for women. Too much alcohol can cause health problems. If you have a BMI higher than 25  · Your doctor may do other tests to check your risk for weight-related health problems. This may include measuring the distance around your waist. A waist measurement of more than 40 inches in men or 35 inches in women can increase the risk of weight-related health problems. · Talk with your doctor about steps you can take to stay healthy or improve your health. You may need to make lifestyle changes to lose weight and stay healthy, such as changing your diet and getting regular exercise. If you have a BMI lower than 18.5  · Your doctor may do other tests to check your risk for health problems. · Talk with your doctor about steps you can take to stay healthy or improve your health. You may need to make lifestyle changes to gain or maintain weight and stay healthy, such as getting more healthy foods in your diet and doing exercises to build muscle. Where can you learn more? Go to http://bashir-princess.info/. Enter S176 in the search box to learn more about \"Body Mass Index: Care Instructions. \"  Current as of: October 13, 2016  Content Version: 11.4  © 4156-4911 [employer], [employer]. Care instructions adapted under license by [employer] (which disclaims liability or warranty for this information). If you have questions about a medical condition or this instruction, always ask your healthcare professional. [place] [address] any warranty or liability for your use of this information. High Blood Pressure: Care Instructions  Your Care Instructions    If your blood pressure is usually above 140/90, you have high blood pressure, or hypertension.  That means the top number is 140 or higher or the bottom number is 90 or higher, or both. Despite what a lot of people think, high blood pressure usually doesn't cause headaches or make you feel dizzy or lightheaded. It usually has no symptoms. But it does increase your risk for heart attack, stroke, and kidney or eye damage. The higher your blood pressure, the more your risk increases. Your doctor will give you a goal for your blood pressure. Your goal will be based on your health and your age. An example of a goal is to keep your blood pressure below 140/90. Lifestyle changes, such as eating healthy and being active, are always important to help lower blood pressure. You might also take medicine to reach your blood pressure goal.  Follow-up care is a key part of your treatment and safety. Be sure to make and go to all appointments, and call your doctor if you are having problems. It's also a good idea to know your test results and keep a list of the medicines you take. How can you care for yourself at home? Medical treatment  · If you stop taking your medicine, your blood pressure will go back up. You may take one or more types of medicine to lower your blood pressure. Be safe with medicines. Take your medicine exactly as prescribed. Call your doctor if you think you are having a problem with your medicine. · Talk to your doctor before you start taking aspirin every day. Aspirin can help certain people lower their risk of a heart attack or stroke. But taking aspirin isn't right for everyone, because it can cause serious bleeding. · See your doctor regularly. You may need to see the doctor more often at first or until your blood pressure comes down. · If you are taking blood pressure medicine, talk to your doctor before you take decongestants or anti-inflammatory medicine, such as ibuprofen. Some of these medicines can raise blood pressure. · Learn how to check your blood pressure at home. Lifestyle changes  · Stay at a healthy weight.  This is especially important if you put on weight around the waist. Losing even 10 pounds can help you lower your blood pressure. · If your doctor recommends it, get more exercise. Walking is a good choice. Bit by bit, increase the amount you walk every day. Try for at least 30 minutes on most days of the week. You also may want to swim, bike, or do other activities. · Avoid or limit alcohol. Talk to your doctor about whether you can drink any alcohol. · Try to limit how much sodium you eat to less than 2,300 milligrams (mg) a day. Your doctor may ask you to try to eat less than 1,500 mg a day. · Eat plenty of fruits (such as bananas and oranges), vegetables, legumes, whole grains, and low-fat dairy products. · Lower the amount of saturated fat in your diet. Saturated fat is found in animal products such as milk, cheese, and meat. Limiting these foods may help you lose weight and also lower your risk for heart disease. · Do not smoke. Smoking increases your risk for heart attack and stroke. If you need help quitting, talk to your doctor about stop-smoking programs and medicines. These can increase your chances of quitting for good. When should you call for help? Call 911 anytime you think you may need emergency care. This may mean having symptoms that suggest that your blood pressure is causing a serious heart or blood vessel problem. Your blood pressure may be over 180/110. ? For example, call 911 if:  ? · You have symptoms of a heart attack. These may include:  ¨ Chest pain or pressure, or a strange feeling in the chest.  ¨ Sweating. ¨ Shortness of breath. ¨ Nausea or vomiting. ¨ Pain, pressure, or a strange feeling in the back, neck, jaw, or upper belly or in one or both shoulders or arms. ¨ Lightheadedness or sudden weakness. ¨ A fast or irregular heartbeat. ? · You have symptoms of a stroke.  These may include:  ¨ Sudden numbness, tingling, weakness, or loss of movement in your face, arm, or leg, especially on only one side of your body. ¨ Sudden vision changes. ¨ Sudden trouble speaking. ¨ Sudden confusion or trouble understanding simple statements. ¨ Sudden problems with walking or balance. ¨ A sudden, severe headache that is different from past headaches. ? · You have severe back or belly pain. ?Do not wait until your blood pressure comes down on its own. Get help right away. ?Call your doctor now or seek immediate care if:  ? · Your blood pressure is much higher than normal (such as 180/110 or higher), but you don't have symptoms. ? · You think high blood pressure is causing symptoms, such as:  ¨ Severe headache. ¨ Blurry vision. ? Watch closely for changes in your health, and be sure to contact your doctor if:  ? · Your blood pressure measures 140/90 or higher at least 2 times. That means the top number is 140 or higher or the bottom number is 90 or higher, or both. ? · You think you may be having side effects from your blood pressure medicine. ? · Your blood pressure is usually normal, but it goes above normal at least 2 times. Where can you learn more? Go to http://bashir-princess.info/. Enter E237 in the search box to learn more about \"High Blood Pressure: Care Instructions. \"  Current as of: September 21, 2016  Content Version: 11.4  © 1885-3495 [employer]. Care instructions adapted under license by [employer] (which disclaims liability or warranty for this information). If you have questions about a medical condition or this instruction, always ask your healthcare professional. [place] [address] any warranty or liability for your use of this information. Insomnia: Care Instructions  Your Care Instructions    Insomnia is the inability to sleep well. It is a common problem for most people at some time. Insomnia may make it hard for you to get to sleep, stay asleep, or sleep as long as you need to.  This can make you tired and grouchy during the day. It can also make you forgetful, less effective at work, and unhappy. Insomnia can be caused by conditions such as depression or anxiety. Pain can also affect your ability to sleep. When these problems are solved, the insomnia usually clears up. But sometimes bad sleep habits can cause insomnia. If insomnia is affecting your work or your enjoyment of life, you can take steps to improve your sleep. Follow-up care is a key part of your treatment and safety. Be sure to make and go to all appointments, and call your doctor if you are having problems. It's also a good idea to know your test results and keep a list of the medicines you take. How can you care for yourself at home? What to avoid  · Do not have drinks with caffeine, such as coffee or black tea, for 8 hours before bed. · Do not smoke or use other types of tobacco near bedtime. Nicotine is a stimulant and can keep you awake. · Avoid drinking alcohol late in the evening, because it can cause you to wake in the middle of the night. · Do not eat a big meal close to bedtime. If you are hungry, eat a light snack. · Do not drink a lot of water close to bedtime, because the need to urinate may wake you up during the night. · Do not read or watch TV in bed. Use the bed only for sleeping and sexual activity. What to try  · Go to bed at the same time every night, and wake up at the same time every morning. Do not take naps during the day. · Keep your bedroom quiet, dark, and cool. · Sleep on a comfortable pillow and mattress. · If watching the clock makes you anxious, turn it facing away from you so you cannot see the time. · If you worry when you lie down, start a worry book. Well before bedtime, write down your worries, and then set the book and your concerns aside. · Try meditation or other relaxation techniques before you go to bed. · If you cannot fall asleep, get up and go to another room until you feel sleepy.  Do something relaxing. Repeat your bedtime routine before you go to bed again. · Make your house quiet and calm about an hour before bedtime. Turn down the lights, turn off the TV, log off the computer, and turn down the volume on music. This can help you relax after a busy day. When should you call for help? Watch closely for changes in your health, and be sure to contact your doctor if:  ? · Your efforts to improve your sleep do not work. ? · Your insomnia gets worse. ? · You have been feeling down, depressed, or hopeless or have lost interest in things that you usually enjoy. Where can you learn more? Go to http://bashir-princess.info/. Enter P513 in the search box to learn more about \"Insomnia: Care Instructions. \"  Current as of: July 26, 2016  Content Version: 11.4  © 7901-5030 [employer]. Care instructions adapted under license by [employer] (which disclaims liability or warranty for this information). If you have questions about a medical condition or this instruction, always ask your healthcare professional. [address] any warranty or liability for your use of this information.

## 2018-03-01 NOTE — MR AVS SNAPSHOT
[address] [place] [address] 
 
 
 [address] [address] [address] [address] [phone] Patient: Connor Hernández MRN: [id number] GENEVA:0/25/2097 Visit Information Date & Time Provider Department Dept. Phone Encounter #  
 3/1/2018  9:00 AM Giacomo Souza NP [address] [id number] Follow-up Instructions Return in about 3 weeks (around 3/22/2018), or if symptoms worsen or fail to improve, for 30 min follow up. Upcoming Health Maintenance Date Due DTaP/Tdap/Td series (1 - Tdap) 6/15/1995 PAP AKA CERVICAL CYTOLOGY 6/15/1995 Influenza Age 5 to Adult 8/1/2017 Allergies as of 3/1/2018  Review Complete On: 3/1/2018 By: Giacomo Souza NP No Known Allergies Current Immunizations  Never Reviewed No immunizations on file. Not reviewed this visit You Were Diagnosed With   
  
 Codes Comments Establishing care with new doctor, encounter for    -  Primary ICD-10-CM: Z76.89 
ICD-9-CM: V65.8 Abnormal weight gain     ICD-10-CM: R63.5 ICD-9-CM: 783.1 BMI 36.0-36.9,adult     ICD-10-CM: G82.57 
ICD-9-CM: V85.36 Essential hypertension     ICD-10-CM: I10 
ICD-9-CM: 401.9 Vitamin D deficiency     ICD-10-CM: E55.9 ICD-9-CM: 268.9 Insomnia, unspecified type     ICD-10-CM: G47.00 ICD-9-CM: 780.52 Vitals BP Pulse Temp Resp Height(growth percentile) Weight(growth percentile) 134/84 (BP 1 Location: Left arm, BP Patient Position: Sitting) 77 98 °F (36.7 °C) (Oral) 20 4' 11\" (1.499 m) 181 lb (82.1 kg) BMI OB Status Smoking Status 36.56 kg/m2 Hysterectomy Never Smoker BMI and BSA Data Body Mass Index Body Surface Area  
 36.56 kg/m 2 1.85 m 2 Your Updated Medication List  
  
   
This list is accurate as of 3/1/18  9:40 AM.  Always use your most recent med list.  
  
  
  
  
 eszopiclone 3 mg tablet Commonly known as:  Alesha Mau TAKE 1 TABLET BY MOUTH EVERY OTHER NIGHT  
  
 hydrOXYzine HCl 50 mg tablet Commonly known as:  ATARAX TAKE 1/2,1,OR 2 TABLETS BY MOUTH EVERY NIGHT AT BEDTIME FOR INSOMNIA  
  
 zolpidem 10 mg tablet Commonly known as:  AMBIEN  
TAKE 1 TABLET BY MOUTH EVERY OTHER DAY We Performed the Following SLEEP MEDICINE REFERRAL [WER469 Custom] Comments:  
 Please evaluate and treat for insomnia. Follow-up Instructions Return in about 3 weeks (around 3/22/2018), or if symptoms worsen or fail to improve, for 30 min follow up. To-Do List   
 03/01/2018 Lab:  CBC WITH AUTOMATED DIFF   
  
 03/01/2018 Lab:  HEMOGLOBIN A1C WITH EAG   
  
 03/01/2018 Lab:  T4, FREE   
  
 03/01/2018 Lab:  VITAMIN D, 25 HYDROXY Around 05/30/2018 Lab:  LIPID PANEL Around 05/30/2018 Lab:  METABOLIC PANEL, COMPREHENSIVE Around 05/30/2018 Lab:  TSH 3RD GENERATION Referral Information Referral ID Referred By Referred To  
  
 [id number] Shira Jigaras CHATA Not Available Visits Status Start Date End Date 1 New Request 3/1/18 3/1/19 If your referral has a status of pending review or denied, additional information will be sent to support the outcome of this decision. Patient Instructions Please contact our office if you have any questions about your visit today. Body Mass Index: Care Instructions Your Care Instructions Body mass index (BMI) can help you see if your weight is raising your risk for health problems. It uses a formula to compare how much you weigh with how tall you are. · A BMI lower than 18.5 is considered underweight. · A BMI between 18.5 and 24.9 is considered healthy. · A BMI between 25 and 29.9 is considered overweight. A BMI of 30 or higher is considered obese. If your BMI is in the normal range, it means that you have a lower risk for weight-related health problems.  If your BMI is in the overweight or obese range, you may be at increased risk for weight-related health problems, such as high blood pressure, heart disease, stroke, arthritis or joint pain, and diabetes. If your BMI is in the underweight range, you may be at increased risk for health problems such as fatigue, lower protection (immunity) against illness, muscle loss, bone loss, hair loss, and hormone problems. BMI is just one measure of your risk for weight-related health problems. You may be at higher risk for health problems if you are not active, you eat an unhealthy diet, or you drink too much alcohol or use tobacco products. Follow-up care is a key part of your treatment and safety. Be sure to make and go to all appointments, and call your doctor if you are having problems. It's also a good idea to know your test results and keep a list of the medicines you take. How can you care for yourself at home? · Practice healthy eating habits. This includes eating plenty of fruits, vegetables, whole grains, lean protein, and low-fat dairy. · If your doctor recommends it, get more exercise. Walking is a good choice. Bit by bit, increase the amount you walk every day. Try for at least 30 minutes on most days of the week. · Do not smoke. Smoking can increase your risk for health problems. If you need help quitting, talk to your doctor about stop-smoking programs and medicines. These can increase your chances of quitting for good. · Limit alcohol to 2 drinks a day for men and 1 drink a day for women. Too much alcohol can cause health problems. If you have a BMI higher than 25 · Your doctor may do other tests to check your risk for weight-related health problems. This may include measuring the distance around your waist. A waist measurement of more than 40 inches in men or 35 inches in women can increase the risk of weight-related health problems.  
· Talk with your doctor about steps you can take to stay healthy or improve your health. You may need to make lifestyle changes to lose weight and stay healthy, such as changing your diet and getting regular exercise. If you have a BMI lower than 18.5 · Your doctor may do other tests to check your risk for health problems. · Talk with your doctor about steps you can take to stay healthy or improve your health. You may need to make lifestyle changes to gain or maintain weight and stay healthy, such as getting more healthy foods in your diet and doing exercises to build muscle. Where can you learn more? Go to http://bashir-princess.info/. Enter S176 in the search box to learn more about \"Body Mass Index: Care Instructions. \" Current as of: October 13, 2016 Content Version: 11.4 © 4563-4404 Booster.[employer]. Care instructions adapted under license by [employer] (which disclaims liability or warranty for this information). If you have questions about a medical condition or this instruction, always ask your healthcare professional. [place] [address] any warranty or liability for your use of this information. High Blood Pressure: Care Instructions Your Care Instructions If your blood pressure is usually above 140/90, you have high blood pressure, or hypertension. That means the top number is 140 or higher or the bottom number is 90 or higher, or both. Despite what a lot of people think, high blood pressure usually doesn't cause headaches or make you feel dizzy or lightheaded. It usually has no symptoms. But it does increase your risk for heart attack, stroke, and kidney or eye damage. The higher your blood pressure, the more your risk increases. Your doctor will give you a goal for your blood pressure. Your goal will be based on your health and your age. An example of a goal is to keep your blood pressure below 140/90.  
Lifestyle changes, such as eating healthy and being active, are always important to help lower blood pressure. You might also take medicine to reach your blood pressure goal. 
Follow-up care is a key part of your treatment and safety. Be sure to make and go to all appointments, and call your doctor if you are having problems. It's also a good idea to know your test results and keep a list of the medicines you take. How can you care for yourself at home? Medical treatment · If you stop taking your medicine, your blood pressure will go back up. You may take one or more types of medicine to lower your blood pressure. Be safe with medicines. Take your medicine exactly as prescribed. Call your doctor if you think you are having a problem with your medicine. · Talk to your doctor before you start taking aspirin every day. Aspirin can help certain people lower their risk of a heart attack or stroke. But taking aspirin isn't right for everyone, because it can cause serious bleeding. · See your doctor regularly. You may need to see the doctor more often at first or until your blood pressure comes down. · If you are taking blood pressure medicine, talk to your doctor before you take decongestants or anti-inflammatory medicine, such as ibuprofen. Some of these medicines can raise blood pressure. · Learn how to check your blood pressure at home. Lifestyle changes · Stay at a healthy weight. This is especially important if you put on weight around the waist. Losing even 10 pounds can help you lower your blood pressure. · If your doctor recommends it, get more exercise. Walking is a good choice. Bit by bit, increase the amount you walk every day. Try for at least 30 minutes on most days of the week. You also may want to swim, bike, or do other activities. · Avoid or limit alcohol. Talk to your doctor about whether you can drink any alcohol. · Try to limit how much sodium you eat to less than 2,300 milligrams (mg) a day. Your doctor may ask you to try to eat less than 1,500 mg a day. · Eat plenty of fruits (such as bananas and oranges), vegetables, legumes, whole grains, and low-fat dairy products. · Lower the amount of saturated fat in your diet. Saturated fat is found in animal products such as milk, cheese, and meat. Limiting these foods may help you lose weight and also lower your risk for heart disease. · Do not smoke. Smoking increases your risk for heart attack and stroke. If you need help quitting, talk to your doctor about stop-smoking programs and medicines. These can increase your chances of quitting for good. When should you call for help? Call 911 anytime you think you may need emergency care. This may mean having symptoms that suggest that your blood pressure is causing a serious heart or blood vessel problem. Your blood pressure may be over 180/110. ? For example, call 911 if: 
? · You have symptoms of a heart attack. These may include: ¨ Chest pain or pressure, or a strange feeling in the chest. 
¨ Sweating. ¨ Shortness of breath. ¨ Nausea or vomiting. ¨ Pain, pressure, or a strange feeling in the back, neck, jaw, or upper belly or in one or both shoulders or arms. ¨ Lightheadedness or sudden weakness. ¨ A fast or irregular heartbeat. ? · You have symptoms of a stroke. These may include: 
¨ Sudden numbness, tingling, weakness, or loss of movement in your face, arm, or leg, especially on only one side of your body. ¨ Sudden vision changes. ¨ Sudden trouble speaking. ¨ Sudden confusion or trouble understanding simple statements. ¨ Sudden problems with walking or balance. ¨ A sudden, severe headache that is different from past headaches. ? · You have severe back or belly pain. ?Do not wait until your blood pressure comes down on its own. Get help right away. ?Call your doctor now or seek immediate care if: 
? · Your blood pressure is much higher than normal (such as 180/110 or higher), but you don't have symptoms. ? · You think high blood pressure is causing symptoms, such as: ¨ Severe headache. ¨ Blurry vision. ? Watch closely for changes in your health, and be sure to contact your doctor if: 
? · Your blood pressure measures 140/90 or higher at least 2 times. That means the top number is 140 or higher or the bottom number is 90 or higher, or both. ? · You think you may be having side effects from your blood pressure medicine. ? · Your blood pressure is usually normal, but it goes above normal at least 2 times. Where can you learn more? Go to http://bashir-princess.info/. Enter P557 in the search box to learn more about \"High Blood Pressure: Care Instructions. \" Current as of: September 21, 2016 Content Version: 11.4 © 7979-7659 [employer]. Care instructions adapted under license by [employer] (which disclaims liability or warranty for this information). If you have questions about a medical condition or this instruction, always ask your healthcare professional. [place] [address] any warranty or liability for your use of this information. Insomnia: Care Instructions Your Care Instructions Insomnia is the inability to sleep well. It is a common problem for most people at some time. Insomnia may make it hard for you to get to sleep, stay asleep, or sleep as long as you need to. This can make you tired and grouchy during the day. It can also make you forgetful, less effective at work, and unhappy. Insomnia can be caused by conditions such as depression or anxiety. Pain can also affect your ability to sleep. When these problems are solved, the insomnia usually clears up. But sometimes bad sleep habits can cause insomnia. If insomnia is affecting your work or your enjoyment of life, you can take steps to improve your sleep. Follow-up care is a key part of your treatment and safety.  Be sure to make and go to all appointments, and call your doctor if you are having problems. It's also a good idea to know your test results and keep a list of the medicines you take. How can you care for yourself at home? What to avoid · Do not have drinks with caffeine, such as coffee or black tea, for 8 hours before bed. · Do not smoke or use other types of tobacco near bedtime. Nicotine is a stimulant and can keep you awake. · Avoid drinking alcohol late in the evening, because it can cause you to wake in the middle of the night. · Do not eat a big meal close to bedtime. If you are hungry, eat a light snack. · Do not drink a lot of water close to bedtime, because the need to urinate may wake you up during the night. · Do not read or watch TV in bed. Use the bed only for sleeping and sexual activity. What to try · Go to bed at the same time every night, and wake up at the same time every morning. Do not take naps during the day. · Keep your bedroom quiet, dark, and cool. · Sleep on a comfortable pillow and mattress. · If watching the clock makes you anxious, turn it facing away from you so you cannot see the time. · If you worry when you lie down, start a worry book. Well before bedtime, write down your worries, and then set the book and your concerns aside. · Try meditation or other relaxation techniques before you go to bed. · If you cannot fall asleep, get up and go to another room until you feel sleepy. Do something relaxing. Repeat your bedtime routine before you go to bed again. · Make your house quiet and calm about an hour before bedtime. Turn down the lights, turn off the TV, log off the computer, and turn down the volume on music. This can help you relax after a busy day. When should you call for help? Watch closely for changes in your health, and be sure to contact your doctor if: 
? · Your efforts to improve your sleep do not work. ? · Your insomnia gets worse. ? · You have been feeling down, depressed, or hopeless or have lost interest in things that you usually enjoy. Where can you learn more? Go to http://bashir-princess.info/. Enter P513 in the search box to learn more about \"Insomnia: Care Instructions. \" Current as of: July 26, 2016 Content Version: 11.4 © 1395-9899 [employer]. Care instructions adapted under license by [employer]rettapproved (which disclaims liability or warranty for this information). If you have questions about a medical condition or this instruction, always ask your healthcare professional. [address] any warranty or liability for your use of this information. Introducing [facility]! Jhony Fisher introduces Vast patient portal. Now you can access parts of your medical record, email your doctor's office, and request medication refills online. 1. In your internet browser, go to [email or web address] 2. Click on the First Time User? Click Here link in the Sign In box. You will see the New Member Sign Up page. 3. Enter your Vast Access Code exactly as it appears below. You will not need to use this code after youve completed the sign-up process. If you do not sign up before the expiration date, you must request a new code. · Vast Access Code: [email or web address] Expires: 3/14/2018  3:55 PM 
 
4. Enter the last four digits of your Social Security Number (xxxx) and Date of Birth (mm/dd/yyyy) as indicated and click Submit. You will be taken to the next sign-up page. 5. Create a [employer] ID. This will be your Vast login ID and cannot be changed, so think of one that is secure and easy to remember. 6. Create a Vast password. You can change your password at any time. 7. Enter your Password Reset Question and Answer. This can be used at a later time if you forget your password. 8. Enter your e-mail address. You will receive e-mail notification when new information is available in [address] 9. Click Sign Up. You can now view and download portions of your medical record. 10. Click the Download Summary menu link to download a portable copy of your medical information. If you have questions, please visit the Frequently Asked Questions section of the Acrolinx website. Remember, Acrolinx is NOT to be used for urgent needs. For medical emergencies, dial 911. Now available from your iPhone and Android! Please provide this summary of care documentation to your next provider. Your primary care clinician is listed as Lilian Shin. If you have any questions after today's visit, please call [phone].

## 2018-03-01 NOTE — PROGRESS NOTES
HPI  Chen Jones is a 36 y.o. female  Chief Complaint   Patient presents with    Establish Care    Hypertension    Weight Gain    Insomnia   Would like to establish care. Moving from [place]  Denies being on blood pressure medication. Reports she was taken off of these as her blood pressure was stable. Reports she wears compression stockings as her ankles did start to swell about three weeks ago. Reports this was an issue for her in 2013 but they swelling resolved. Reports abnormal weight gain. Reports she walks for an hour every day at lunch and usually has a salad for lunch. Reports she is unsure why she is gaining weight. Insomnia - reports Lunesta and Ambien are not helping with her sleep so she has not taken them in awhile. Reports 7 hours of sleep last night but states she needs an average of 9 hours. Reports seeing a GYN and being place on Divigel for hot flashes. Would like GYN to continue to manage this. Past Medical History  Past Medical History:   Diagnosis Date    Insomnia        Surgical History  Past Surgical History:   Procedure Laterality Date    PLASTIC SURGERY, NECK      GA ANESTH,SURGERY OF SHOULDER          Medications  Current Outpatient Prescriptions   Medication Sig Dispense Refill    hydrOXYzine HCl (ATARAX) 50 mg tablet TAKE 1/2,1,OR 2 TABLETS BY MOUTH EVERY NIGHT AT BEDTIME FOR INSOMNIA  5       Allergies  No Known Allergies    Family History  Family History   Problem Relation Age of Onset    Hypertension Mother     Hypertension Father     Cancer Father      prostate       Social History  Social History     Social History    Marital status: SINGLE     Spouse name: N/A    Number of children: N/A    Years of education: N/A     Occupational History    Not on file.      Social History Main Topics    Smoking status: Never Smoker    Smokeless tobacco: Never Used    Alcohol use No    Drug use: No    Sexual activity: Not on file     Other Topics Concern    Not on file Social History Narrative       Problem List  There is no problem list on file for this patient. Review of Systems  Review of Systems   Constitutional: Negative for chills and fever. Respiratory: Negative for shortness of breath. Cardiovascular: Positive for leg swelling. Negative for chest pain. Gastrointestinal: Negative for abdominal pain, nausea and vomiting. Musculoskeletal: Negative for falls. Neurological: Negative for dizziness. Psychiatric/Behavioral: Negative for depression, substance abuse and suicidal ideas. The patient has insomnia. Vital Signs  Vitals:    03/01/18 0913   BP: 134/84   Pulse: 77   Resp: 20   Temp: 98 °F (36.7 °C)   TempSrc: Oral   Weight: 181 lb (82.1 kg)   Height: 4' 11\" (1.499 m)   PainSc:   0 - No pain       Physical Exam  Physical Exam   Constitutional: She is oriented to person, place, and time. HENT:   Mouth/Throat: Oropharynx is clear and moist.   Cardiovascular: Normal rate, regular rhythm and normal heart sounds. No murmur heard. Pulmonary/Chest: Effort normal and breath sounds normal. No respiratory distress. She has no wheezes. Abdominal: Soft. Bowel sounds are normal. She exhibits no distension. There is no tenderness. There is no rebound and no guarding. Musculoskeletal: She exhibits no edema. No ankle swelling noted. Full ROM. Neurological: She is alert and oriented to person, place, and time. Psychiatric: She has a normal mood and affect. Her behavior is normal.   Vitals reviewed.       Diagnostics  Orders Placed This Encounter    CBC WITH AUTOMATED DIFF     Standing Status:   Future     Number of Occurrences:   1     Standing Expiration Date:   4/0/7120    METABOLIC PANEL, COMPREHENSIVE     Standing Status:   Future     Standing Expiration Date:   8/29/2018    TSH 3RD GENERATION     Standing Status:   Future     Standing Expiration Date:   8/29/2018    T4, FREE     Standing Status:   Future     Number of Occurrences:   1 Standing Expiration Date:   3/2/2019    VITAMIN D, 25 HYDROXY     Standing Status:   Future     Number of Occurrences:   1     Standing Expiration Date:   3/1/2019    LIPID PANEL     Standing Status:   Future     Standing Expiration Date:   8/29/2018    HEMOGLOBIN A1C WITH EAG     Standing Status:   Future     Number of Occurrences:   1     Standing Expiration Date:   3/2/2019    SLEEP MEDICINE REFERRAL     Referral Priority:   Routine     Referral Type:   Consultation     Referral Reason:   Specialty Services Required     Requested Specialty:   Sleep Medicine    hydrOXYzine HCl (ATARAX) 50 mg tablet     Sig: TAKE 1/2,1,OR 2 TABLETS BY MOUTH EVERY NIGHT AT BEDTIME FOR INSOMNIA     Refill:  5    DISCONTD: eszopiclone (LUNESTA) 3 mg tablet     Sig: TAKE 1 TABLET BY MOUTH EVERY OTHER NIGHT     Refill:  0    DISCONTD: zolpidem (AMBIEN) 10 mg tablet     Sig: TAKE 1 TABLET BY MOUTH EVERY OTHER DAY     Refill:  0       Results  No results found for this or any previous visit. Assessment and Plan  Diagnoses and all orders for this visit:    1. Establishing care with new doctor, encounter for  -     VITAMIN D, 25 HYDROXY; Future    2. Abnormal weight gain  -     TSH 3RD GENERATION; Future  -     T4, FREE; Future  -     LIPID PANEL; Future  -     HEMOGLOBIN A1C WITH EAG; Future    3. BMI 36.0-36.9,adult  -     CBC WITH AUTOMATED DIFF; Future  -     TSH 3RD GENERATION; Future  -     T4, FREE; Future  -     HEMOGLOBIN A1C WITH EAG; Future    4. Essential hypertension  -     CBC WITH AUTOMATED DIFF; Future  -     METABOLIC PANEL, COMPREHENSIVE; Future  -     LIPID PANEL; Future    5. Vitamin D deficiency  -     VITAMIN D, 25 HYDROXY; Future    6. Insomnia, unspecified type  -     SLEEP MEDICINE REFERRAL      Discussed importance of sleep hygiene. After care summary printed and reviewed with patient. Plan reviewed with patient. Questions answered.  Patient verbalized understanding of plan and is in agreement with plan. Patient to follow up in one week or earlier if symptoms worsen. LARRY Gipson    Discussed the patient's BMI with her. The BMI follow up plan is as follows:     dietary management education, guidance, and counseling  encourage exercise  monitor weight  prescribed dietary intake    An After Visit Summary was printed and given to the patient.   YONATHAN GipsonC

## 2018-03-06 LAB
25(OH)D3 SERPL-MCNC: 27.2 NG/ML (ref 32–100)
A-G RATIO,AGRAT: 1.1 RATIO (ref 1.1–2.6)
ABSOLUTE LYMPHOCYTE COUNT, 10803: 1.5 K/UL (ref 1–4.8)
ALBUMIN SERPL-MCNC: 4 G/DL (ref 3.5–5)
ALP SERPL-CCNC: 110 U/L (ref 25–115)
ALT SERPL-CCNC: 16 U/L (ref 5–40)
ANION GAP SERPL CALC-SCNC: 18 MMOL/L
AST SERPL W P-5'-P-CCNC: 18 U/L (ref 10–37)
AVG GLU, 10930: 120 MG/DL (ref 91–123)
BASOPHILS # BLD: 0 K/UL (ref 0–0.2)
BASOPHILS NFR BLD: 0 % (ref 0–2)
BILIRUB SERPL-MCNC: 0.3 MG/DL (ref 0.2–1.2)
BUN SERPL-MCNC: 12 MG/DL (ref 6–22)
CALCIUM SERPL-MCNC: 8.8 MG/DL (ref 8.4–10.5)
CHLORIDE SERPL-SCNC: 101 MMOL/L (ref 98–110)
CHOLEST SERPL-MCNC: 308 MG/DL (ref 110–200)
CO2 SERPL-SCNC: 23 MMOL/L (ref 20–32)
CREAT SERPL-MCNC: 0.8 MG/DL (ref 0.5–1.2)
EOSINOPHIL # BLD: 0.1 K/UL (ref 0–0.5)
EOSINOPHIL NFR BLD: 3 % (ref 0–6)
ERYTHROCYTE [DISTWIDTH] IN BLOOD BY AUTOMATED COUNT: 14.7 % (ref 10–16)
GFRAA, 66117: >60
GFRNA, 66118: >60
GLOBULIN,GLOB: 3.6 G/DL (ref 2–4)
GLUCOSE SERPL-MCNC: 86 MG/DL (ref 70–99)
GRANULOCYTES,GRANS: 58 % (ref 40–75)
HBA1C MFR BLD HPLC: 5.8 % (ref 4.8–5.9)
HCT VFR BLD AUTO: 35.9 % (ref 35.1–46.5)
HDLC SERPL-MCNC: 4.6 MG/DL (ref 0–5)
HDLC SERPL-MCNC: 67 MG/DL (ref 40–59)
HGB BLD-MCNC: 11.1 G/DL (ref 11.7–15.5)
LDLC SERPL CALC-MCNC: 226 MG/DL (ref 50–99)
LYMPHOCYTES, LYMLT: 31 % (ref 27–45)
MCH RBC QN AUTO: 26 PG (ref 26–34)
MCHC RBC AUTO-ENTMCNC: 31 G/DL (ref 32–36)
MCV RBC AUTO: 85 FL (ref 80–95)
MONOCYTES # BLD: 0.4 K/UL (ref 0.1–0.9)
MONOCYTES NFR BLD: 8 % (ref 3–9)
NEUTROPHILS # BLD AUTO: 2.8 K/UL (ref 1.8–7.7)
PLATELET # BLD AUTO: 362 K/UL (ref 140–440)
PMV BLD AUTO: 10.4 FL (ref 6–10.8)
POTASSIUM SERPL-SCNC: 3.9 MMOL/L (ref 3.5–5.5)
PROT SERPL-MCNC: 7.6 G/DL (ref 6.4–8.3)
RBC # BLD AUTO: 4.24 M/UL (ref 3.8–5.2)
SODIUM SERPL-SCNC: 142 MMOL/L (ref 133–145)
T4 FREE SERPL-MCNC: 1.1 NG/DL (ref 0.9–1.8)
TRIGL SERPL-MCNC: 73 MG/DL (ref 40–149)
TSH SERPL DL<=0.005 MIU/L-ACNC: 2.95 MCU/ML (ref 0.27–4.2)
VLDLC SERPL CALC-MCNC: 15 MG/DL (ref 8–30)
WBC # BLD AUTO: 4.8 K/UL (ref 4–11)

## 2018-03-22 ENCOUNTER — OFFICE VISIT (OUTPATIENT)
Dept: FAMILY MEDICINE CLINIC | Age: 43
End: 2018-03-22

## 2018-03-22 VITALS
RESPIRATION RATE: 16 BRPM | HEART RATE: 77 BPM | BODY MASS INDEX: 37.29 KG/M2 | TEMPERATURE: 97.7 F | SYSTOLIC BLOOD PRESSURE: 137 MMHG | HEIGHT: 59 IN | DIASTOLIC BLOOD PRESSURE: 87 MMHG | WEIGHT: 185 LBS

## 2018-03-22 DIAGNOSIS — E78.5 HYPERLIPIDEMIA, UNSPECIFIED HYPERLIPIDEMIA TYPE: ICD-10-CM

## 2018-03-22 DIAGNOSIS — R63.5 ABNORMAL WEIGHT GAIN: ICD-10-CM

## 2018-03-22 DIAGNOSIS — I10 ESSENTIAL HYPERTENSION: Primary | ICD-10-CM

## 2018-03-22 RX ORDER — ZOLPIDEM TARTRATE 10 MG/1
TABLET ORAL
Refills: 2 | COMMUNITY
Start: 2018-03-16 | End: 2019-04-11

## 2018-03-22 NOTE — PROGRESS NOTES
Chief Complaint   Patient presents with    Follow-up     3 week f/u    Weight Gain     abnormal    Hypertension    Vitamin D Deficiency    Insomnia    Results     discuss lab results       1. Have you been to the ER, urgent care clinic since your last visit? Hospitalized since your last visit? No    2. Have you seen or consulted any other health care providers outside of the [address] [place] [address] since your last visit? Include any pap smears or colon screening.  No

## 2018-03-22 NOTE — PROGRESS NOTES
HPI  Criss Dumont is a 36 y.o. female  Chief Complaint   Patient presents with    Follow-up     3 week f/u    Weight Gain     abnormal    Hypertension    Vitamin D Deficiency    Insomnia    Results     discuss lab results     Concerned about her continual weight gain. Denies nausea, vomiting, or abdominal pain. Admits to large portions. Requesting lab results. Hypertension - denies chest pain or shortness of breath. Insomnia -intermittent. Past Medical History  Past Medical History:   Diagnosis Date    Insomnia        Surgical History  Past Surgical History:   Procedure Laterality Date    PLASTIC SURGERY, NECK      LA ANESTH,SURGERY OF SHOULDER          Medications  Current Outpatient Prescriptions   Medication Sig Dispense Refill    zolpidem (AMBIEN) 10 mg tablet TAKE 1 TABLET BY MOUTH EVERY OTHER DAY  2    hydrOXYzine HCl (ATARAX) 50 mg tablet TAKE 1/2,1,OR 2 TABLETS BY MOUTH EVERY NIGHT AT BEDTIME FOR INSOMNIA  5       Allergies  No Known Allergies    Family History  Family History   Problem Relation Age of Onset    Hypertension Mother     Hypertension Father     Cancer Father      prostate       Social History  Social History     Social History    Marital status: SINGLE     Spouse name: N/A    Number of children: N/A    Years of education: N/A     Occupational History    Not on file. Social History Main Topics    Smoking status: Never Smoker    Smokeless tobacco: Never Used    Alcohol use No    Drug use: No    Sexual activity: Not on file     Other Topics Concern    Not on file     Social History Narrative       Problem List  There is no problem list on file for this patient. Review of Systems  Review of Systems   Constitutional: Negative for chills and fever. Eyes: Negative for blurred vision. Respiratory: Negative for shortness of breath. Cardiovascular: Negative for chest pain and palpitations.    Gastrointestinal: Negative for abdominal pain, nausea and vomiting. Neurological: Negative for dizziness, speech change, loss of consciousness and headaches. Psychiatric/Behavioral: The patient has insomnia. Vital Signs  Vitals:    03/22/18 0856   BP: 137/87   Pulse: 77   Resp: 16   Temp: 97.7 °F (36.5 °C)   TempSrc: Oral   Weight: 185 lb (83.9 kg)   Height: 4' 11\" (1.499 m)   PainSc:   0 - No pain       Physical Exam  Physical Exam   Constitutional: She is oriented to person, place, and time. Cardiovascular: Normal rate and regular rhythm. No murmur heard. Pulmonary/Chest: Effort normal and breath sounds normal. No respiratory distress. Abdominal: Soft. Bowel sounds are normal. She exhibits no distension. Neurological: She is alert and oriented to person, place, and time. Psychiatric: She has a normal mood and affect. Her behavior is normal.   Vitals reviewed. Diagnostics  Orders Placed This Encounter    zolpidem (AMBIEN) 10 mg tablet     Sig: TAKE 1 TABLET BY MOUTH EVERY OTHER DAY     Refill:  2       Results  Results for orders placed or performed in visit on 03/05/18   LIPID PANEL   Result Value Ref Range    Triglyceride 73 40 - 149 mg/dL    HDL Cholesterol 67 (H) 40 - 59 mg/dL    Cholesterol, total 308 (H) 110 - 200 mg/dL    CHOLESTEROL/HDL 4.6 0.0 - 5.0    LDL, calculated 226 (H) 50 - 99 mg/dL    VLDL, calculated 15 8 - 30 mg/dL   METABOLIC PANEL, COMPREHENSIVE   Result Value Ref Range    Glucose 86 70 - 99 mg/dL    BUN 12 6 - 22 mg/dL    Creatinine 0.8 0.5 - 1.2 mg/dL    Sodium 142 133 - 145 mmol/L    Potassium 3.9 3.5 - 5.5 mmol/L    Chloride 101 98 - 110 mmol/L    CO2 23 20 - 32 mmol/L    AST (SGOT) 18 10 - 37 U/L    ALT (SGPT) 16 5 - 40 U/L    Alk.  phosphatase 110 25 - 115 U/L    Bilirubin, total 0.3 0.2 - 1.2 mg/dL    Calcium 8.8 8.4 - 10.5 mg/dL    Protein, total 7.6 6.4 - 8.3 g/dL    Albumin 4.0 3.5 - 5.0 g/dL    A-G Ratio 1.1 1.1 - 2.6 ratio    Globulin 3.6 2.0 - 4.0 g/dL    Anion gap 18.0 mmol/L    GFRAA >60.0 >60.0    GFRNA >60.0 >60.0   TSH 3RD GENERATION   Result Value Ref Range    TSH 2.95 0.27 - 4.20 mcU/mL   HEMOGLOBIN A1C W/O EAG   Result Value Ref Range    Hemoglobin A1c 5.8 4.8 - 5.9 %    AVG  91 - 123 mg/dL         Assessment and Plan  Diagnoses and all orders for this visit:    1. Essential hypertension    2. Hyperlipidemia, unspecified hyperlipidemia type    3. Abnormal weight gain    4. BMI 36.0-36.9,adult    Lab results discussed. Weight loss options discussed including medical and surgical options. Diet and exercise discussed. After care summary printed and reviewed with patient. Plan reviewed with patient. Questions answered. Patient verbalized understanding of plan and is in agreement with plan. Patient to follow up in two weeks or earlier if symptoms worsen. Will further discuss weight loss options at that time. Patient will make life style changes and attempt to loose at least one pound in the next two weeks.     Rolando Quesada, FNP-C

## 2018-03-22 NOTE — PATIENT INSTRUCTIONS
Please contact our office if you have any questions about your visit today. DASH Diet: Care Instructions  Your Care Instructions    The DASH diet is an eating plan that can help lower your blood pressure. DASH stands for Dietary Approaches to Stop Hypertension. Hypertension is high blood pressure. The DASH diet focuses on eating foods that are high in calcium, potassium, and magnesium. These nutrients can lower blood pressure. The foods that are highest in these nutrients are fruits, vegetables, low-fat dairy products, nuts, seeds, and legumes. But taking calcium, potassium, and magnesium supplements instead of eating foods that are high in those nutrients does not have the same effect. The DASH diet also includes whole grains, fish, and poultry. The DASH diet is one of several lifestyle changes your doctor may recommend to lower your high blood pressure. Your doctor may also want you to decrease the amount of sodium in your diet. Lowering sodium while following the DASH diet can lower blood pressure even further than just the DASH diet alone. Follow-up care is a key part of your treatment and safety. Be sure to make and go to all appointments, and call your doctor if you are having problems. It's also a good idea to know your test results and keep a list of the medicines you take. How can you care for yourself at home? Following the DASH diet  · Eat 4 to 5 servings of fruit each day. A serving is 1 medium-sized piece of fruit, ½ cup chopped or canned fruit, 1/4 cup dried fruit, or 4 ounces (½ cup) of fruit juice. Choose fruit more often than fruit juice. · Eat 4 to 5 servings of vegetables each day. A serving is 1 cup of lettuce or raw leafy vegetables, ½ cup of chopped or cooked vegetables, or 4 ounces (½ cup) of vegetable juice. Choose vegetables more often than vegetable juice. · Get 2 to 3 servings of low-fat and fat-free dairy each day.  A serving is 8 ounces of milk, 1 cup of yogurt, or 1 ½ ounces of cheese. · Eat 6 to 8 servings of grains each day. A serving is 1 slice of bread, 1 ounce of dry cereal, or ½ cup of cooked rice, pasta, or cooked cereal. Try to choose whole-grain products as much as possible. · Limit lean meat, poultry, and fish to 2 servings each day. A serving is 3 ounces, about the size of a deck of cards. · Eat 4 to 5 servings of nuts, seeds, and legumes (cooked dried beans, lentils, and split peas) each week. A serving is 1/3 cup of nuts, 2 tablespoons of seeds, or ½ cup of cooked beans or peas. · Limit fats and oils to 2 to 3 servings each day. A serving is 1 teaspoon of vegetable oil or 2 tablespoons of salad dressing. · Limit sweets and added sugars to 5 servings or less a week. A serving is 1 tablespoon jelly or jam, ½ cup sorbet, or 1 cup of lemonade. · Eat less than 2,300 milligrams (mg) of sodium a day. If you limit your sodium to 1,500 mg a day, you can lower your blood pressure even more. Tips for success  · Start small. Do not try to make dramatic changes to your diet all at once. You might feel that you are missing out on your favorite foods and then be more likely to not follow the plan. Make small changes, and stick with them. Once those changes become habit, add a few more changes. · Try some of the following:  ¨ Make it a goal to eat a fruit or vegetable at every meal and at snacks. This will make it easy to get the recommended amount of fruits and vegetables each day. ¨ Try yogurt topped with fruit and nuts for a snack or healthy dessert. ¨ Add lettuce, tomato, cucumber, and onion to sandwiches. ¨ Combine a ready-made pizza crust with low-fat mozzarella cheese and lots of vegetable toppings. Try using tomatoes, squash, spinach, broccoli, carrots, cauliflower, and onions. ¨ Have a variety of cut-up vegetables with a low-fat dip as an appetizer instead of chips and dip. ¨ Sprinkle sunflower seeds or chopped almonds over salads.  Or try adding chopped walnuts or almonds to cooked vegetables. ¨ Try some vegetarian meals using beans and peas. Add garbanzo or kidney beans to salads. Make burritos and tacos with mashed campbell beans or black beans. Where can you learn more? Go to http://bashir-princess.info/. Enter V478 in the search box to learn more about \"DASH Diet: Care Instructions. \"  Current as of: September 21, 2016  Content Version: 11.4  © 5403-7279 [employer]. Care instructions adapted under license by [employer] (which disclaims liability or warranty for this information). If you have questions about a medical condition or this instruction, always ask your healthcare professional. [address] any warranty or liability for your use of this information. Abnormal Weight Gain: Care Instructions  Your Care Instructions    There are two types of weight gain-normal and abnormal. Normal weight gain is usually caused by eating too much or exercising too little. It can also happen as you get older. But abnormal weight gain has other causes. It can be caused by a problem with your thyroid gland, called hypothyroidism. Or it can be caused by a problem with your adrenal glands, called Cushing's syndrome. Or your body could be holding too much fluid because of kidney, liver, or heart problems. In some cases, a medicine you take can cause you to gain weight. You can work with your doctor to find out the cause of your weight gain. You will probably need tests to do this. Follow-up care is a key part of your treatment and safety. Be sure to make and go to all appointments, and call your doctor if you are having problems. It's also a good idea to know your test results and keep a list of the medicines you take. How can you care for yourself at home? · Weigh yourself at the same time every day. It's best to do it first thing in the morning after you empty your bladder.  Be sure to always wear the same amount of clothing. · Write down any changes in your weight and the possible causes. Discuss these with your doctor. · Your doctor may want you to change your diet and exercise habits. A good way to lose weight is to reduce calories and increase exercise. · Walking is an easy way to get exercise. Try to walk a little longer every day. You also may want to swim, bike, or do other activities. · Ask your doctor if you should see a dietitian. This is a person who can help you plan meals that work best for your lifestyle. · If your doctor prescribed medicines, take them exactly as prescribed. Call your doctor if you think you are having a problem with your medicine. You will get more details on the specific medicines your doctor prescribes. When should you call for help? Watch closely for changes in your health, and be sure to contact your doctor if:  ? · You do not get better as expected. ? · You continue to gain weight. Where can you learn more? Go to http://bashir-princess.info/. Enter A175 in the search box to learn more about \"Abnormal Weight Gain: Care Instructions. \"  Current as of: October 13, 2016  Content Version: 11.4  © 3117-9774 [employer]. Care instructions adapted under license by [employer] (which disclaims liability or warranty for this information). If you have questions about a medical condition or this instruction, always ask your healthcare professional. [address] any warranty or liability for your use of this information. Low Sodium Diet (2,000 Milligram): Care Instructions  Your Care Instructions    Too much sodium causes your body to hold on to extra water. This can raise your blood pressure and force your heart and kidneys to work harder. In very serious cases, this could cause you to be put in the hospital. It might even be life-threatening.  By limiting sodium, you will feel better and lower your risk of serious problems. The most common source of sodium is salt. People get most of the salt in their diet from canned, prepared, and packaged foods. Fast food and restaurant meals also are very high in sodium. Your doctor will probably limit your sodium to less than 2,000 milligrams (mg) a day. This limit counts all the sodium in prepared and packaged foods and any salt you add to your food. Follow-up care is a key part of your treatment and safety. Be sure to make and go to all appointments, and call your doctor if you are having problems. It's also a good idea to know your test results and keep a list of the medicines you take. How can you care for yourself at home? Read food labels  · Read labels on cans and food packages. The labels tell you how much sodium is in each serving. Make sure that you look at the serving size. If you eat more than the serving size, you have eaten more sodium. · Food labels also tell you the Percent Daily Value for sodium. Choose products with low Percent Daily Values for sodium. · Be aware that sodium can come in forms other than salt, including monosodium glutamate (MSG), sodium citrate, and sodium bicarbonate (baking soda). MSG is often added to Asian food. When you eat out, you can sometimes ask for food without MSG or added salt. Buy low-sodium foods  · Buy foods that are labeled \"unsalted\" (no salt added), \"sodium-free\" (less than 5 mg of sodium per serving), or \"low-sodium\" (less than 140 mg of sodium per serving). Foods labeled \"reduced-sodium\" and \"light sodium\" may still have too much sodium. Be sure to read the label to see how much sodium you are getting. · Buy fresh vegetables, or frozen vegetables without added sauces. Buy low-sodium versions of canned vegetables, soups, and other canned goods. Prepare low-sodium meals  · Cut back on the amount of salt you use in cooking. This will help you adjust to the taste. Do not add salt after cooking.  One teaspoon of salt has about 2,300 mg of sodium. · Take the salt shaker off the table. · Flavor your food with garlic, lemon juice, onion, vinegar, herbs, and spices. Do not use soy sauce, lite soy sauce, steak sauce, onion salt, garlic salt, celery salt, mustard, or ketchup on your food. · Use low-sodium salad dressings, sauces, and ketchup. Or make your own salad dressings and sauces without adding salt. · Use less salt (or none) when recipes call for it. You can often use half the salt a recipe calls for without losing flavor. Other foods such as rice, pasta, and grains do not need added salt. · Rinse canned vegetables, and cook them in fresh water. This removes some-but not all-of the salt. · Avoid water that is naturally high in sodium or that has been treated with water softeners, which add sodium. Call your local water company to find out the sodium content of your water supply. If you buy bottled water, read the label and choose a sodium-free brand. Avoid high-sodium foods  · Avoid eating:  ¨ Smoked, cured, salted, and canned meat, fish, and poultry. ¨ Ham, ochoa, hot dogs, and luncheon meats. ¨ Regular, hard, and processed cheese and regular peanut butter. ¨ Crackers with salted tops, and other salted snack foods such as pretzels, chips, and salted popcorn. ¨ Frozen prepared meals, unless labeled low-sodium. ¨ Canned and dried soups, broths, and bouillon, unless labeled sodium-free or low-sodium. ¨ Canned vegetables, unless labeled sodium-free or low-sodium. ¨ Western [place]onya fries, pizza, tacos, and other fast foods. ¨ Pickles, olives, ketchup, and other condiments, especially soy sauce, unless labeled sodium-free or low-sodium. Where can you learn more? Go to http://bashir-princess.info/. Enter Q696 in the search box to learn more about \"Low Sodium Diet (2,000 Milligram): Care Instructions. \"  Current as of: May 12, 2017  Content Version: 11.4  © 9782-0331 [employer].  Care instructions adapted under license by [employer] (which disclaims liability or warranty for this information). If you have questions about a medical condition or this instruction, always ask your healthcare professional. [address] any warranty or liability for your use of this information.

## 2018-03-22 NOTE — LETTER
NOTIFICATION RETURN TO WORK / SCHOOL 
 
3/22/2018 9:41 AM 
 
Ms. Terri Hernandez [address] [phone] To Whom It May Concern: 
 
Terri Hernandez is currently under the care of G[facility]me[facility] 3/22/2018. She will return to work/school on: 3/22/2018 If there are questions or concerns please have the patient contact our office.  
 
 
 
Sincerely, 
 
 
Rajendra Mohr NP

## 2018-03-22 NOTE — MR AVS SNAPSHOT
[address] [facility] 
 
 
 [address] [id number] [place] [id number] [phone] Patient: Millicent Wilkes MRN: [id number] OYC:[id number] Visit Information Date & Time Provider Department Dept. Phone Encounter #  
 3/22/2018  8:45 AM Aleksander Savage NP [address] [id number] Follow-up Instructions Return in about 2 weeks (around 4/5/2018), or if symptoms worsen or fail to improve. Upcoming Health Maintenance Date Due DTaP/Tdap/Td series (1 - Tdap) 6/15/1995 PAP AKA CERVICAL CYTOLOGY 6/15/1995 Influenza Age 5 to Adult 8/1/2017 Allergies as of 3/22/2018  Review Complete On: 3/22/2018 By: Aleksander Savage NP No Known Allergies Current Immunizations  Never Reviewed No immunizations on file. Not reviewed this visit You Were Diagnosed With   
  
 Codes Comments Essential hypertension    -  Primary ICD-10-CM: I10 
ICD-9-CM: 401.9 Hyperlipidemia, unspecified hyperlipidemia type     ICD-10-CM: E78.5 ICD-9-CM: 272.4 Abnormal weight gain     ICD-10-CM: R63.5 ICD-9-CM: 783.1 BMI 36.0-36.9,adult     ICD-10-CM: S62.11 
ICD-9-CM: V85.36 Vitals BP Pulse Temp Resp Height(growth percentile) Weight(growth percentile) 137/87 (BP 1 Location: Right arm, BP Patient Position: Sitting) 77 97.7 °F (36.5 °C) (Oral) 16 4' 11\" (1.499 m) 185 lb (83.9 kg) BMI OB Status Smoking Status 37.37 kg/m2 Hysterectomy Never Smoker BMI and BSA Data Body Mass Index Body Surface Area  
 37.37 kg/m 2 1.87 m 2 Your Updated Medication List  
  
   
This list is accurate as of 3/22/18  9:27 AM.  Always use your most recent med list.  
  
  
  
  
 hydrOXYzine HCl 50 mg tablet Commonly known as:  ATARAX TAKE 1/2,1,OR 2 TABLETS BY MOUTH EVERY NIGHT AT BEDTIME FOR INSOMNIA  
  
 zolpidem 10 mg tablet Commonly known as:  AMBIEN  
TAKE 1 TABLET BY MOUTH EVERY OTHER DAY  
  
  
  
 Follow-up Instructions Return in about 2 weeks (around 4/5/2018), or if symptoms worsen or fail to improve. Patient Instructions Please contact our office if you have any questions about your visit today. DASH Diet: Care Instructions Your Care Instructions The DASH diet is an eating plan that can help lower your blood pressure. DASH stands for Dietary Approaches to Stop Hypertension. Hypertension is high blood pressure. The DASH diet focuses on eating foods that are high in calcium, potassium, and magnesium. These nutrients can lower blood pressure. The foods that are highest in these nutrients are fruits, vegetables, low-fat dairy products, nuts, seeds, and legumes. But taking calcium, potassium, and magnesium supplements instead of eating foods that are high in those nutrients does not have the same effect. The DASH diet also includes whole grains, fish, and poultry. The DASH diet is one of several lifestyle changes your doctor may recommend to lower your high blood pressure. Your doctor may also want you to decrease the amount of sodium in your diet. Lowering sodium while following the DASH diet can lower blood pressure even further than just the DASH diet alone. Follow-up care is a key part of your treatment and safety. Be sure to make and go to all appointments, and call your doctor if you are having problems. It's also a good idea to know your test results and keep a list of the medicines you take. How can you care for yourself at home? Following the DASH diet · Eat 4 to 5 servings of fruit each day. A serving is 1 medium-sized piece of fruit, ½ cup chopped or canned fruit, 1/4 cup dried fruit, or 4 ounces (½ cup) of fruit juice. Choose fruit more often than fruit juice. · Eat 4 to 5 servings of vegetables each day.  A serving is 1 cup of lettuce or raw leafy vegetables, ½ cup of chopped or cooked vegetables, or 4 ounces (½ cup) of vegetable juice. Choose vegetables more often than vegetable juice. · Get 2 to 3 servings of low-fat and fat-free dairy each day. A serving is 8 ounces of milk, 1 cup of yogurt, or 1 ½ ounces of cheese. · Eat 6 to 8 servings of grains each day. A serving is 1 slice of bread, 1 ounce of dry cereal, or ½ cup of cooked rice, pasta, or cooked cereal. Try to choose whole-grain products as much as possible. · Limit lean meat, poultry, and fish to 2 servings each day. A serving is 3 ounces, about the size of a deck of cards. · Eat 4 to 5 servings of nuts, seeds, and legumes (cooked dried beans, lentils, and split peas) each week. A serving is 1/3 cup of nuts, 2 tablespoons of seeds, or ½ cup of cooked beans or peas. · Limit fats and oils to 2 to 3 servings each day. A serving is 1 teaspoon of vegetable oil or 2 tablespoons of salad dressing. · Limit sweets and added sugars to 5 servings or less a week. A serving is 1 tablespoon jelly or jam, ½ cup sorbet, or 1 cup of lemonade. · Eat less than 2,300 milligrams (mg) of sodium a day. If you limit your sodium to 1,500 mg a day, you can lower your blood pressure even more. Tips for success · Start small. Do not try to make dramatic changes to your diet all at once. You might feel that you are missing out on your favorite foods and then be more likely to not follow the plan. Make small changes, and stick with them. Once those changes become habit, add a few more changes. · Try some of the following: ¨ Make it a goal to eat a fruit or vegetable at every meal and at snacks. This will make it easy to get the recommended amount of fruits and vegetables each day. ¨ Try yogurt topped with fruit and nuts for a snack or healthy dessert. ¨ Add lettuce, tomato, cucumber, and onion to sandwiches. ¨ Combine a ready-made pizza crust with low-fat mozzarella cheese and lots of vegetable toppings.  Try using tomatoes, squash, spinach, broccoli, carrots, cauliflower, and onions. ¨ Have a variety of cut-up vegetables with a low-fat dip as an appetizer instead of chips and dip. ¨ Sprinkle sunflower seeds or chopped almonds over salads. Or try adding chopped walnuts or almonds to cooked vegetables. ¨ Try some vegetarian meals using beans and peas. Add garbanzo or kidney beans to salads. Make burritos and tacos with mashed campbell beans or black beans. Where can you learn more? Go to http://bashirNexx New Zealandprincess.info/. Enter O910 in the search box to learn more about \"DASH Diet: Care Instructions. \" Current as of: September 21, 2016 Content Version: 11.4 © 0036-5784 [employer]. Care instructions adapted under license by [employer] (which disclaims liability or warranty for this information). If you have questions about a medical condition or this instruction, always ask your healthcare professional. [place] [address] any warranty or liability for your use of this information. Abnormal Weight Gain: Care Instructions Your Care Instructions There are two types of weight gain-normal and abnormal. Normal weight gain is usually caused by eating too much or exercising too little. It can also happen as you get older. But abnormal weight gain has other causes. It can be caused by a problem with your thyroid gland, called hypothyroidism. Or it can be caused by a problem with your adrenal glands, called Cushing's syndrome. Or your body could be holding too much fluid because of kidney, liver, or heart problems. In some cases, a medicine you take can cause you to gain weight. You can work with your doctor to find out the cause of your weight gain. You will probably need tests to do this. Follow-up care is a key part of your treatment and safety. Be sure to make and go to all appointments, and call your doctor if you are having problems.  It's also a good idea to know your test results and keep a list of the medicines you take. How can you care for yourself at home? · Weigh yourself at the same time every day. It's best to do it first thing in the morning after you empty your bladder. Be sure to always wear the same amount of clothing. · Write down any changes in your weight and the possible causes. Discuss these with your doctor. · Your doctor may want you to change your diet and exercise habits. A good way to lose weight is to reduce calories and increase exercise. · Walking is an easy way to get exercise. Try to walk a little longer every day. You also may want to swim, bike, or do other activities. · Ask your doctor if you should see a dietitian. This is a person who can help you plan meals that work best for your lifestyle. · If your doctor prescribed medicines, take them exactly as prescribed. Call your doctor if you think you are having a problem with your medicine. You will get more details on the specific medicines your doctor prescribes. When should you call for help? Watch closely for changes in your health, and be sure to contact your doctor if: 
? · You do not get better as expected. ? · You continue to gain weight. Where can you learn more? Go to http://bashir-princess.info/. Enter A175 in the search box to learn more about \"Abnormal Weight Gain: Care Instructions. \" Current as of: October 13, 2016 Content Version: 11.4 © 0416-2586 [employer]. Care instructions adapted under license by [employer] (which disclaims liability or warranty for this information). If you have questions about a medical condition or this instruction, always ask your healthcare professional. [place] [address] any warranty or liability for your use of this information. Low Sodium Diet (2,000 Milligram): Care Instructions Your Care Instructions Too much sodium causes your body to hold on to extra water.  This can raise your blood pressure and force your heart and kidneys to work harder. In very serious cases, this could cause you to be put in the hospital. It might even be life-threatening. By limiting sodium, you will feel better and lower your risk of serious problems. The most common source of sodium is salt. People get most of the salt in their diet from canned, prepared, and packaged foods. Fast food and restaurant meals also are very high in sodium. Your doctor will probably limit your sodium to less than 2,000 milligrams (mg) a day. This limit counts all the sodium in prepared and packaged foods and any salt you add to your food. Follow-up care is a key part of your treatment and safety. Be sure to make and go to all appointments, and call your doctor if you are having problems. It's also a good idea to know your test results and keep a list of the medicines you take. How can you care for yourself at home? Read food labels · Read labels on cans and food packages. The labels tell you how much sodium is in each serving. Make sure that you look at the serving size. If you eat more than the serving size, you have eaten more sodium. · Food labels also tell you the Percent Daily Value for sodium. Choose products with low Percent Daily Values for sodium. · Be aware that sodium can come in forms other than salt, including monosodium glutamate (MSG), sodium citrate, and sodium bicarbonate (baking soda). MSG is often added to Asian food. When you eat out, you can sometimes ask for food without MSG or added salt. Buy low-sodium foods · Buy foods that are labeled \"unsalted\" (no salt added), \"sodium-free\" (less than 5 mg of sodium per serving), or \"low-sodium\" (less than 140 mg of sodium per serving). Foods labeled \"reduced-sodium\" and \"light sodium\" may still have too much sodium. Be sure to read the label to see how much sodium you are getting. · Buy fresh vegetables, or frozen vegetables without added sauces.  Buy low-sodium versions of canned vegetables, soups, and other canned goods. Prepare low-sodium meals · Cut back on the amount of salt you use in cooking. This will help you adjust to the taste. Do not add salt after cooking. One teaspoon of salt has about 2,300 mg of sodium. · Take the salt shaker off the table. · Flavor your food with garlic, lemon juice, onion, vinegar, herbs, and spices. Do not use soy sauce, lite soy sauce, steak sauce, onion salt, garlic salt, celery salt, mustard, or ketchup on your food. · Use low-sodium salad dressings, sauces, and ketchup. Or make your own salad dressings and sauces without adding salt. · Use less salt (or none) when recipes call for it. You can often use half the salt a recipe calls for without losing flavor. Other foods such as rice, pasta, and grains do not need added salt. · Rinse canned vegetables, and cook them in fresh water. This removes some-but not all-of the salt. · Avoid water that is naturally high in sodium or that has been treated with water softeners, which add sodium. Call your local water company to find out the sodium content of your water supply. If you buy bottled water, read the label and choose a sodium-free brand. Avoid high-sodium foods · Avoid eating: ¨ Smoked, cured, salted, and canned meat, fish, and poultry. ¨ Ham, ochoa, hot dogs, and luncheon meats. ¨ Regular, hard, and processed cheese and regular peanut butter. ¨ Crackers with salted tops, and other salted snack foods such as pretzels, chips, and salted popcorn. ¨ Frozen prepared meals, unless labeled low-sodium. ¨ Canned and dried soups, broths, and bouillon, unless labeled sodium-free or low-sodium. ¨ Canned vegetables, unless labeled sodium-free or low-sodium. ¨ Western Latonya fries, pizza, tacos, and other fast foods. ¨ Pickles, olives, ketchup, and other condiments, especially soy sauce, unless labeled sodium-free or low-sodium. Where can you learn more? Go to http://bashir-princess.info/. Enter K344 in the search box to learn more about \"Low Sodium Diet (2,000 Milligram): Care Instructions. \" Current as of: May 12, 2017 Content Version: 11.4 © 7143-6552 [employer]. Care instructions adapted under license by [employer] (which disclaims liability or warranty for this information). If you have questions about a medical condition or this instruction, always ask your healthcare professional. [address] any warranty or liability for your use of this information. Introducing [facility]! Dear Lilo Waddell: 
Thank you for requesting a TouchBistro account. Our records indicate that you already have an active TouchBistro account. You can access your account anytime at [email or web address] Did you know that you can access your hospital and ER discharge instructions at any time in TouchBistro? You can also review all of your test results from your hospital stay or ER visit. Additional Information If you have questions, please visit the Frequently Asked Questions section of the TouchBistro website at [email or web address] Remember, TouchBistro is NOT to be used for urgent needs. For medical emergencies, dial 911. Now available from your iPhone and Android! Please provide this summary of care documentation to your next provider. Your primary care clinician is listed as Paige Oliver. If you have any questions after today's visit, please call [phone].

## 2018-04-05 ENCOUNTER — OFFICE VISIT (OUTPATIENT)
Dept: FAMILY MEDICINE CLINIC | Age: 43
End: 2018-04-05

## 2018-04-05 ENCOUNTER — HOSPITAL ENCOUNTER (OUTPATIENT)
Dept: LAB | Age: 43
Discharge: HOME | End: 2018-04-05
Payer: COMMERCIAL

## 2018-04-05 VITALS
HEART RATE: 83 BPM | DIASTOLIC BLOOD PRESSURE: 85 MMHG | HEIGHT: 59 IN | RESPIRATION RATE: 16 BRPM | OXYGEN SATURATION: 97 % | SYSTOLIC BLOOD PRESSURE: 125 MMHG | WEIGHT: 182 LBS | TEMPERATURE: 97.5 F | BODY MASS INDEX: 36.69 KG/M2

## 2018-04-05 DIAGNOSIS — R63.5 WEIGHT GAIN, ABNORMAL: ICD-10-CM

## 2018-04-05 DIAGNOSIS — R05.9 COUGH: ICD-10-CM

## 2018-04-05 DIAGNOSIS — I10 ESSENTIAL HYPERTENSION: Primary | ICD-10-CM

## 2018-04-05 DIAGNOSIS — Z12.39 BREAST CANCER SCREENING: ICD-10-CM

## 2018-04-05 DIAGNOSIS — J30.89 ENVIRONMENTAL AND SEASONAL ALLERGIES: ICD-10-CM

## 2018-04-05 DIAGNOSIS — I10 ESSENTIAL HYPERTENSION: ICD-10-CM

## 2018-04-05 PROCEDURE — 93005 ELECTROCARDIOGRAM TRACING: CPT

## 2018-04-05 RX ORDER — BENZONATATE 100 MG/1
100 CAPSULE ORAL
Qty: 21 CAP | Refills: 0 | Status: SHIPPED | OUTPATIENT
Start: 2018-04-05 | End: 2018-04-12

## 2018-04-05 RX ORDER — PHENTERMINE HYDROCHLORIDE 37.5 MG/1
TABLET ORAL
Qty: 30 TAB | Refills: 0 | Status: SHIPPED | OUTPATIENT
Start: 2018-04-05 | End: 2018-05-03 | Stop reason: SDUPTHER

## 2018-04-05 RX ORDER — ESTRADIOL 1 MG/1
1 TABLET ORAL DAILY
COMMUNITY
End: 2019-06-06 | Stop reason: SDUPTHER

## 2018-04-05 NOTE — PROGRESS NOTES
Chief Complaint   Patient presents with    Follow-up     2 week follow up    Hypertension    Sinus Infection     started Sunday 4/1/2018. Patient states that it is getting better     1. Have you been to the ER, urgent care clinic since your last visit? Hospitalized since your last visit? No    2. Have you seen or consulted any other health care providers outside of the [address] [place] [address] since your last visit? Include any pap smears or colon screening. No     Health Maintenance Due   Topic Date Due    DTaP/Tdap/Td series (1 - Tdap) 06/15/1995    PAP AKA CERVICAL CYTOLOGY  06/15/1995    Influenza Age 5 to Adult  08/01/2017   Patient has had a influenza vaccination. Patient states that it was done in [place]. Patient will bring in vaccination paperwork at next visit. Patient will be making an appointment with her Gynecologist to schedule her next PAP.

## 2018-04-05 NOTE — PROGRESS NOTES
HPI  Rodell Mohs is a 36 y.o. female  Chief Complaint   Patient presents with    Follow-up     2 week follow up    Hypertension    Sinus Infection     started Sunday 4/1/2018. Patient states that it is getting better     Taking allegra D as she has had some sinus drainage. Reports pollen trigger her sinus drainage when she was in [place] 6 days ago. Had chills 4 days ago and did not feel well with cough and sinus drainage. Reports taking OTC mucinex and states she feels a little better. Reports cough with yellow sputum production. Denies ill contacts. Thinks it is the weather and her allergies. Reports she has loss 3lbs but continues to struggle with her appetite and her weight. Denies chest pain and or chest palpitations. Requesting weight loss medications at this time. Past Medical History  Past Medical History:   Diagnosis Date    Insomnia        Surgical History  Past Surgical History:   Procedure Laterality Date    PLASTIC SURGERY, NECK      CT ANESTH,SURGERY OF SHOULDER          Medications  Current Outpatient Prescriptions   Medication Sig Dispense Refill    estradiol (ESTRACE) 1 mg tablet Take 1 mg by mouth daily.  benzonatate (TESSALON) 100 mg capsule Take 1 Cap by mouth three (3) times daily as needed for Cough for up to 7 days. 21 Cap 0    hydrOXYzine HCl (ATARAX) 50 mg tablet TAKE 1/2,1,OR 2 TABLETS BY MOUTH EVERY NIGHT AT BEDTIME FOR INSOMNIA  5    zolpidem (AMBIEN) 10 mg tablet TAKE 1 TABLET BY MOUTH EVERY OTHER DAY  2       Allergies  Allergies   Allergen Reactions    Dilaudid [Hydromorphone] Hives       Family History  Family History   Problem Relation Age of Onset    Hypertension Mother     Hypertension Father     Cancer Father      prostate       Social History  Social History     Social History    Marital status: SINGLE     Spouse name: N/A    Number of children: N/A    Years of education: N/A     Occupational History    Not on file.      Social History Main Topics  Smoking status: Never Smoker    Smokeless tobacco: Never Used    Alcohol use No    Drug use: No    Sexual activity: Not on file     Other Topics Concern    Not on file     Social History Narrative       Problem List  There is no problem list on file for this patient. Review of Systems  Review of Systems   Constitutional: Positive for chills. Negative for fever. HENT: Positive for sinus pain. Negative for ear pain and sore throat. Respiratory: Positive for cough and sputum production. Negative for shortness of breath and wheezing. Cardiovascular: Negative for chest pain and palpitations. Gastrointestinal: Negative for abdominal pain, blood in stool, nausea and vomiting. Genitourinary: Negative for hematuria. Endo/Heme/Allergies: Positive for environmental allergies. Psychiatric/Behavioral: Negative for depression and suicidal ideas. The patient has insomnia. Vital Signs  Vitals:    04/05/18 1443   BP: 125/85   Pulse: 83   Resp: 16   Temp: 97.5 °F (36.4 °C)   TempSrc: Oral   SpO2: 97%   Weight: 182 lb (82.6 kg)   Height: 4' 11\" (1.499 m)   PainSc:   0 - No pain       Physical Exam  Physical Exam   Constitutional: She is oriented to person, place, and time. HENT:   Right Ear: Tympanic membrane normal.   Left Ear: Tympanic membrane normal.   Nose: Right sinus exhibits maxillary sinus tenderness. Left sinus exhibits maxillary sinus tenderness. Mouth/Throat: Oropharyngeal exudate (clear drainage) present. Cardiovascular: Normal rate, regular rhythm and normal heart sounds. No murmur heard. Pulmonary/Chest: Effort normal and breath sounds normal. No respiratory distress. She has no wheezes. Neurological: She is alert and oriented to person, place, and time. Vitals reviewed.       Diagnostics  Orders Placed This Encounter    [facility] MAMMO BI SCREENING INCL CAD     Standing Status:   Future     Standing Expiration Date:   5/6/2019     Order Specific Question:   Reason for Exam Answer:   annual     Order Specific Question:   Is Patient Pregnant? Answer:   Unknown    EKG, 12 LEAD, INITIAL     Standing Status:   Future     Standing Expiration Date:   10/5/2018     Order Specific Question:   Reason for Exam:     Answer:   medication administration    estradiol (ESTRACE) 1 mg tablet     Sig: Take 1 mg by mouth daily.  benzonatate (TESSALON) 100 mg capsule     Sig: Take 1 Cap by mouth three (3) times daily as needed for Cough for up to 7 days. Dispense:  21 Cap     Refill:  0       Results  Results for orders placed or performed in visit on 03/05/18   LIPID PANEL   Result Value Ref Range    Triglyceride 73 40 - 149 mg/dL    HDL Cholesterol 67 (H) 40 - 59 mg/dL    Cholesterol, total 308 (H) 110 - 200 mg/dL    CHOLESTEROL/HDL 4.6 0.0 - 5.0    LDL, calculated 226 (H) 50 - 99 mg/dL    VLDL, calculated 15 8 - 30 mg/dL   METABOLIC PANEL, COMPREHENSIVE   Result Value Ref Range    Glucose 86 70 - 99 mg/dL    BUN 12 6 - 22 mg/dL    Creatinine 0.8 0.5 - 1.2 mg/dL    Sodium 142 133 - 145 mmol/L    Potassium 3.9 3.5 - 5.5 mmol/L    Chloride 101 98 - 110 mmol/L    CO2 23 20 - 32 mmol/L    AST (SGOT) 18 10 - 37 U/L    ALT (SGPT) 16 5 - 40 U/L    Alk. phosphatase 110 25 - 115 U/L    Bilirubin, total 0.3 0.2 - 1.2 mg/dL    Calcium 8.8 8.4 - 10.5 mg/dL    Protein, total 7.6 6.4 - 8.3 g/dL    Albumin 4.0 3.5 - 5.0 g/dL    A-G Ratio 1.1 1.1 - 2.6 ratio    Globulin 3.6 2.0 - 4.0 g/dL    Anion gap 18.0 mmol/L    GFRAA >60.0 >60.0    GFRNA >60.0 >60.0   TSH 3RD GENERATION   Result Value Ref Range    TSH 2.95 0.27 - 4.20 mcU/mL   HEMOGLOBIN A1C W/O EAG   Result Value Ref Range    Hemoglobin A1c 5.8 4.8 - 5.9 %    AVG  91 - 123 mg/dL           Assessment and Plan  Diagnoses and all orders for this visit:    1. Essential hypertension  -     EKG, 12 LEAD, INITIAL; Future    2. BMI 36.0-36.9,adult  -     EKG, 12 LEAD, INITIAL; Future    3.  Breast cancer screening  -     [facility] MAMMO BI SCREENING INCL CAD; Future    4. Weight gain, abnormal  -     EKG, 12 LEAD, INITIAL; Future    5. Environmental and seasonal allergies    6. Cough  -     benzonatate (TESSALON) 100 mg capsule; Take 1 Cap by mouth three (3) times daily as needed for Cough for up to 7 days. Instructed to switch from Allegra to Zyrtec.  checked with no concerning activity. Phentermine scripted ordered and held. Will release script to patient after EKG results as long as they are WNL. Medication, side effects, possible allergic reactions and warnings reviewed with patient. Patient verbalized understanding. Discussed phentermine and side effects. Patient will go for an EKG today. After care summary printed and reviewed with patient. Plan reviewed with patient. Questions answered. Patient verbalized understanding of plan and is in agreement with plan. Patient to follow up in one month or earlier if symptoms worsen.     Jamie Scott, PIETER-C

## 2018-04-05 NOTE — MR AVS SNAPSHOT
[address] [facility] 
 
 
 [address] [address] [address] [phone] Patient: Toshia Stover MRN: [id number] DXI:2[birth year]/7338 Visit Information Date & Time Provider Department Dept. Phone Encounter #  
 4/5/2018  2:15 PM Yulissa Su NP [address] [phone] [id number] Follow-up Instructions Return in about 1 month (around 5/5/2018), or if symptoms worsen or fail to improve. Upcoming Health Maintenance Date Due DTaP/Tdap/Td series (1 - Tdap) 6/15/1995 PAP AKA CERVICAL CYTOLOGY 4/5/2021 Allergies as of 4/5/2018  Review Complete On: 4/5/2018 By: Jim Ugalde LPN Severity Noted Reaction Type Reactions Dilaudid [Hydromorphone] Low 04/05/2018    Hives Current Immunizations  Never Reviewed No immunizations on file. Not reviewed this visit You Were Diagnosed With   
  
 Codes Comments Essential hypertension    -  Primary ICD-10-CM: I10 
ICD-9-CM: 401.9 BMI 36.0-36.9,adult     ICD-10-CM: W16.33 
ICD-9-CM: V85.36 Breast cancer screening     ICD-10-CM: Z12.31 
ICD-9-CM: V76.10 Weight gain, abnormal     ICD-10-CM: R63.5 ICD-9-CM: 783.1 Environmental and seasonal allergies     ICD-10-CM: J30.89 ICD-9-CM: 477.8 Cough     ICD-10-CM: R05 ICD-9-CM: 704. 2 Vitals BP Pulse Temp Resp Height(growth percentile) Weight(growth percentile) 125/85 (BP 1 Location: Left arm, BP Patient Position: Sitting) 83 97.5 °F (36.4 °C) (Oral) 16 4' 11\" (1.499 m) 182 lb (82.6 kg) SpO2 BMI OB Status Smoking Status 97% 36.76 kg/m2 Hysterectomy Never Smoker BMI and BSA Data Body Mass Index Body Surface Area  
 36.76 kg/m 2 1.85 m 2 Your Updated Medication List  
  
   
This list is accurate as of 4/5/18  3:21 PM.  Always use your most recent med list.  
  
  
  
  
 benzonatate 100 mg capsule Commonly known as:  TESSALON  
 Take 1 Cap by mouth three (3) times daily as needed for Cough for up to 7 days. estradiol 1 mg tablet Commonly known as:  ESTRACE Take 1 mg by mouth daily. hydrOXYzine HCl 50 mg tablet Commonly known as:  ATARAX TAKE 1/2,1,OR 2 TABLETS BY MOUTH EVERY NIGHT AT BEDTIME FOR INSOMNIA  
  
 zolpidem 10 mg tablet Commonly known as:  AMBIEN  
TAKE 1 TABLET BY MOUTH EVERY OTHER DAY Prescriptions Printed Refills  
 benzonatate (TESSALON) 100 mg capsule 0 Sig: Take 1 Cap by mouth three (3) times daily as needed for Cough for up to 7 days. Class: Print Route: Oral  
  
Follow-up Instructions Return in about 1 month (around 5/5/2018), or if symptoms worsen or fail to improve. To-Do List   
 04/05/2018 ECG:  EKG, 12 LEAD, INITIAL   
  
 10/04/2018 Imaging:  ENEDINA MAMMO BI SCREENING INCL CAD Introducing [facility] & HEALTH SERVICES! Dear Maynor Hansen: 
Thank you for requesting a [employer] account. Our records indicate that you already have an active [employer] account. You can access your account anytime at [email or web address] Did you know that you can access your hospital and ER discharge instructions at any time in [employer]? You can also review all of your test results from your hospital stay or ER visit. Additional Information If you have questions, please visit the Frequently Asked Questions section of the [employer] website at [email or web address] Remember, [employer] is NOT to be used for urgent needs. For medical emergencies, dial 911. Now available from your iPhone and Android! Please provide this summary of care documentation to your next provider. Your primary care clinician is listed as Sebas Mcarthur. If you have any questions after today's visit, please call [phone].

## 2018-04-06 ENCOUNTER — TELEPHONE (OUTPATIENT)
Dept: FAMILY MEDICINE CLINIC | Age: 43
End: 2018-04-06

## 2018-04-06 LAB
ATRIAL RATE: 66 BPM
CALCULATED P AXIS, ECG09: 46 DEGREES
CALCULATED R AXIS, ECG10: 35 DEGREES
CALCULATED T AXIS, ECG11: 8 DEGREES
DIAGNOSIS, 93000: NORMAL
P-R INTERVAL, ECG05: 150 MS
Q-T INTERVAL, ECG07: 402 MS
QRS DURATION, ECG06: 84 MS
QTC CALCULATION (BEZET), ECG08: 421 MS
VENTRICULAR RATE, ECG03: 66 BPM

## 2018-04-06 NOTE — PROGRESS NOTES
Please call patient and inform her that her EKG was negative and I have left her prescription at the  for her to .  Yessy Montoya

## 2018-04-09 NOTE — TELEPHONE ENCOUNTER
I have been informed that we do not do prior authorizations for Phentermine because it can be paid for out of pocket. I will call patient to inform her.

## 2018-05-03 ENCOUNTER — OFFICE VISIT (OUTPATIENT)
Dept: FAMILY MEDICINE CLINIC | Age: 43
End: 2018-05-03

## 2018-05-03 VITALS
OXYGEN SATURATION: 98 % | TEMPERATURE: 97.4 F | WEIGHT: 181 LBS | HEIGHT: 59 IN | DIASTOLIC BLOOD PRESSURE: 82 MMHG | HEART RATE: 88 BPM | SYSTOLIC BLOOD PRESSURE: 134 MMHG | BODY MASS INDEX: 36.49 KG/M2 | RESPIRATION RATE: 18 BRPM

## 2018-05-03 DIAGNOSIS — R63.5 WEIGHT GAIN, ABNORMAL: ICD-10-CM

## 2018-05-03 RX ORDER — PHENTERMINE HYDROCHLORIDE 37.5 MG/1
TABLET ORAL
Qty: 30 TAB | Refills: 0 | Status: SHIPPED | OUTPATIENT
Start: 2018-05-03 | End: 2018-06-01 | Stop reason: SDUPTHER

## 2018-05-03 NOTE — PROGRESS NOTES
Chief Complaint   Patient presents with    Follow-up     1 month follow up    Medication Refill     1. Have you been to the ER, urgent care clinic since your last visit? Hospitalized since your last visit? No    2. Have you seen or consulted any other health care providers outside of the [address] [place] [address] since your last visit? Include any pap smears or colon screening.  No     Health Maintenance Due   Topic Date Due    DTaP/Tdap/Td series (1 - Tdap) 06/15/1995

## 2018-05-03 NOTE — MR AVS SNAPSHOT
[address] [facility] 
 
 
 [address] [address] [place] [id number] [phone] Patient: Ayah Marcelo MRN: [id number] N:[birth year]10 Visit Information Date & Time Provider Department Dept. Phone Encounter #  
 5/3/2018  1:00 PM Bart Rodney, NP [address] [id number] Follow-up Instructions Return in about 1 month (around 6/3/2018), or if symptoms worsen or fail to improve. Upcoming Health Maintenance Date Due DTaP/Tdap/Td series (1 - Tdap) 6/15/1995 Influenza Age 5 to Adult 8/1/2018 PAP AKA CERVICAL CYTOLOGY 4/5/2021 Allergies as of 5/3/2018  Review Complete On: 5/3/2018 By: Jenna Bettencourt LPN Severity Noted Reaction Type Reactions Dilaudid [Hydromorphone] Low 04/05/2018    Hives Current Immunizations  Never Reviewed No immunizations on file. Not reviewed this visit You Were Diagnosed With   
  
 Codes Comments BMI 36.0-36.9,adult     ICD-10-CM: F83.79 
ICD-9-CM: V85.36 Weight gain, abnormal     ICD-10-CM: R63.5 ICD-9-CM: 783.1 Vitals BP Pulse Temp Resp Height(growth percentile) Weight(growth percentile) 134/82 (BP 1 Location: Right arm, BP Patient Position: Sitting) 88 97.4 °F (36.3 °C) (Oral) 18 4' 11\" (1.499 m) 181 lb (82.1 kg) SpO2 BMI OB Status Smoking Status 98% 36.56 kg/m2 Hysterectomy Never Smoker Vitals History BMI and BSA Data Body Mass Index Body Surface Area  
 36.56 kg/m 2 1.85 m 2 Preferred Pharmacy Pharmacy Name Phone [id number] Loretta Judson, [address] [facility] [facility] [phone] Your Updated Medication List  
  
   
This list is accurate as of 5/3/18  2:09 PM.  Always use your most recent med list.  
  
  
  
  
 estradiol 1 mg tablet Commonly known as:  ESTRACE Take 1 mg by mouth daily. hydrOXYzine HCl 50 mg tablet Commonly known as:  ATARAX TAKE 1/2,1,OR 2 TABLETS BY MOUTH EVERY NIGHT AT BEDTIME FOR INSOMNIA  
  
 phentermine 37.5 mg tablet Commonly known as:  ADIPEX-P Take a half a tablet by mouth for the first 6 days. On day 7 start taking a whole tablet daily in the morning. Avoid late evening dosing. zolpidem 10 mg tablet Commonly known as:  AMBIEN  
TAKE 1 TABLET BY MOUTH EVERY OTHER DAY Prescriptions Printed Refills  
 phentermine (ADIPEX-P) 37.5 mg tablet 0 Sig: Take a half a tablet by mouth for the first 6 days. On day 7 start taking a whole tablet daily in the morning. Avoid late evening dosing. Class: Print Follow-up Instructions Return in about 1 month (around 6/3/2018), or if symptoms worsen or fail to improve. Patient Instructions Please contact our office if you have any questions about your visit today. Introducing [facility] & [facility] SERVICES! Dear Shani Landa: 
Thank you for requesting a [employer] account. Our records indicate that you already have an active [employer] account. You can access your account anytime at [email or web address] Did you know that you can access your hospital and ER discharge instructions at any time in [employer]? You can also review all of your test results from your hospital stay or ER visit. Additional Information If you have questions, please visit the Frequently Asked Questions section of the [employer] website at [email or web address] Remember, [employer] is NOT to be used for urgent needs. For medical emergencies, dial 911. Now available from your iPhone and Android! Please provide this summary of care documentation to your next provider. Your primary care clinician is listed as [facility]. If you have any questions after today's visit, please call [phone].

## 2018-05-03 NOTE — LETTER
NOTIFICATION RETURN TO WORK / SCHOOL 
 
5/3/2018 2:13 PM 
 
Ms. Chetan Clarke [address] [id number] To Whom It May Concern: 
 
Chetan Clarke is currently under the care of Gómez Gillespie 5/3/2018. She will return to work/school on: 5/3/2018 If there are questions or concerns please have the patient contact our office.  
 
 
 
Sincerely, 
 
 
Amilcar Olmstead NP

## 2018-05-03 NOTE — PROGRESS NOTES
KOREY Beal is a 36 y.o. female. Spouse Trupti joined in via [employer]. Chief Complaint   Patient presents with    Follow-up     1 month follow up    Medication Refill     Denies nausea, vomiting, or abdominal pain. Denies chest pain or shortness of breath. Reports exercising daily. Reports eating healthy. Unsure why she is not loosing weight. Past Medical History  Past Medical History:   Diagnosis Date    Insomnia        Surgical History  Past Surgical History:   Procedure Laterality Date    PLASTIC SURGERY, NECK      DE ANESTH,SURGERY OF SHOULDER          Medications  Current Outpatient Prescriptions   Medication Sig Dispense Refill    estradiol (ESTRACE) 1 mg tablet Take 1 mg by mouth daily.  phentermine (ADIPEX-P) 37.5 mg tablet Take a half a tablet by mouth for the first 6 days. On day 7 start taking a whole tablet daily in the morning. Avoid late evening dosing. 30 Tab 0    zolpidem (AMBIEN) 10 mg tablet TAKE 1 TABLET BY MOUTH EVERY OTHER DAY  2    hydrOXYzine HCl (ATARAX) 50 mg tablet TAKE 1/2,1,OR 2 TABLETS BY MOUTH EVERY NIGHT AT BEDTIME FOR INSOMNIA  5       Allergies  Allergies   Allergen Reactions    Dilaudid [Hydromorphone] Hives       Family History  Family History   Problem Relation Age of Onset    Hypertension Mother     Hypertension Father     Cancer Father      prostate       Social History  Social History     Social History    Marital status: SINGLE     Spouse name: N/A    Number of children: N/A    Years of education: N/A     Occupational History    Not on file. Social History Main Topics    Smoking status: Never Smoker    Smokeless tobacco: Never Used    Alcohol use No    Drug use: No    Sexual activity: Not on file     Other Topics Concern    Not on file     Social History Narrative       Problem List  There is no problem list on file for this patient. Review of Systems  Review of Systems   Eyes: Negative for blurred vision. Respiratory: Negative for shortness of breath. Cardiovascular: Negative for chest pain. Gastrointestinal: Negative for abdominal pain, nausea and vomiting. Neurological: Negative for dizziness, speech change and headaches. Vital Signs  Vitals:    05/03/18 1328 05/03/18 1333   BP: (!) 141/93 134/82   Pulse: 88    Resp: 18    Temp: 97.4 °F (36.3 °C)    TempSrc: Oral    SpO2: 98%    Weight: 181 lb (82.1 kg)    Height: 4' 11\" (1.499 m)    PainSc:   0 - No pain        Physical Exam  Physical Exam   Constitutional: She is oriented to person, place, and time. HENT:   Mouth/Throat: Oropharynx is clear and moist.   Cardiovascular: Normal rate, regular rhythm and normal heart sounds. Exam reveals no friction rub. No murmur heard. Pulmonary/Chest: Effort normal and breath sounds normal. No respiratory distress. She has no wheezes. Abdominal: Soft. Bowel sounds are normal. She exhibits no distension. There is no tenderness. Neurological: She is alert and oriented to person, place, and time. Psychiatric: She has a normal mood and affect. Her behavior is normal.   Vitals reviewed. Diagnostics  No orders of the defined types were placed in this encounter. Results  Results for orders placed or performed during the hospital encounter of 04/05/18   EKG, 12 LEAD, INITIAL   Result Value Ref Range    Ventricular Rate 66 BPM    Atrial Rate 66 BPM    P-R Interval 150 ms    QRS Duration 84 ms    Q-T Interval 402 ms    QTC Calculation (Bezet) 421 ms    Calculated P Axis 46 degrees    Calculated R Axis 35 degrees    Calculated T Axis 8 degrees    Diagnosis       Normal sinus rhythm  Normal ECG  No previous ECGs available  Confirmed by Kelly Cochran MD, Eva Tam ([id number]) on 4/6/2018 4:34:06 PM         Assessment and Plan  Diagnoses and all orders for this visit:    1. BMI 36.0-36.9,adult  -     phentermine (ADIPEX-P) 37.5 mg tablet; Take a half a tablet by mouth for the first 6 days.  On day 7 start taking a whole tablet daily in the morning. Avoid late evening dosing. 2. Weight gain, abnormal  -     phentermine (ADIPEX-P) 37.5 mg tablet; Take a half a tablet by mouth for the first 6 days. On day 7 start taking a whole tablet daily in the morning. Avoid late evening dosing.  checked with no concerns. Stressed importance of diet and exercise. Long discussion with patient and spouse Danieldanny Morejon) about diet, exercise, and phentermine. More than 50% of 40 minute visit spent counseling and coordinating care with patient face to face on diet, exercise, and phentermine and weight loss options including [facility] Weight loss program and surgical options. .   After care summary printed and reviewed with patient. Plan reviewed with patient. Questions answered. Patient verbalized understanding of plan and is in agreement with plan. Patient to follow up in one month or earlier if symptoms worsen.     YONATHAN PenaC

## 2018-05-11 ENCOUNTER — TELEPHONE (OUTPATIENT)
Dept: FAMILY MEDICINE CLINIC | Age: 43
End: 2018-05-11

## 2018-05-11 NOTE — TELEPHONE ENCOUNTER
Patient states that at her appointment with sleep specialist they recommended that she take trazodone 50 mg. Patient instructed that Ulisses REED referred her to sleep for treatment. Patient verbalized understanding.

## 2018-05-30 DIAGNOSIS — I10 ESSENTIAL HYPERTENSION: ICD-10-CM

## 2018-05-30 DIAGNOSIS — R63.5 ABNORMAL WEIGHT GAIN: ICD-10-CM

## 2018-06-01 ENCOUNTER — OFFICE VISIT (OUTPATIENT)
Dept: FAMILY MEDICINE CLINIC | Age: 43
End: 2018-06-01

## 2018-06-01 VITALS
HEIGHT: 59 IN | SYSTOLIC BLOOD PRESSURE: 115 MMHG | BODY MASS INDEX: 35.99 KG/M2 | TEMPERATURE: 98.4 F | RESPIRATION RATE: 20 BRPM | DIASTOLIC BLOOD PRESSURE: 82 MMHG | WEIGHT: 178.5 LBS | HEART RATE: 84 BPM

## 2018-06-01 DIAGNOSIS — M54.50 ACUTE LEFT-SIDED LOW BACK PAIN WITHOUT SCIATICA: Primary | ICD-10-CM

## 2018-06-01 DIAGNOSIS — R63.5 WEIGHT GAIN, ABNORMAL: ICD-10-CM

## 2018-06-01 PROBLEM — E66.01 SEVERE OBESITY (BMI 35.0-39.9): Status: ACTIVE | Noted: 2018-06-01

## 2018-06-01 RX ORDER — PREDNISONE 20 MG/1
20 TABLET ORAL 2 TIMES DAILY
Qty: 6 TAB | Refills: 0 | Status: SHIPPED | OUTPATIENT
Start: 2018-06-01 | End: 2018-08-20 | Stop reason: ALTCHOICE

## 2018-06-01 RX ORDER — PHENTERMINE HYDROCHLORIDE 37.5 MG/1
TABLET ORAL
Qty: 30 TAB | Refills: 0 | Status: SHIPPED | OUTPATIENT
Start: 2018-06-01 | End: 2018-07-09 | Stop reason: SDUPTHER

## 2018-06-01 RX ORDER — CYCLOBENZAPRINE HCL 5 MG
5 TABLET ORAL
Qty: 10 TAB | Refills: 0 | Status: SHIPPED | OUTPATIENT
Start: 2018-06-01 | End: 2018-08-20 | Stop reason: ALTCHOICE

## 2018-06-01 RX ORDER — TRAZODONE HYDROCHLORIDE 50 MG/1
TABLET ORAL
Refills: 5 | COMMUNITY
Start: 2018-05-15

## 2018-06-01 RX ORDER — KETOROLAC TROMETHAMINE 30 MG/ML
30 INJECTION, SOLUTION INTRAMUSCULAR; INTRAVENOUS ONCE
Qty: 1 VIAL | Refills: 0
Start: 2018-06-01 | End: 2018-06-01

## 2018-06-01 NOTE — PROGRESS NOTES
HPI  Chetan Clarke is a 36 y.o. female  Chief Complaint   Patient presents with    Follow-up     1 month f/u    Weight Management    Back Pain     states she hurt her back while exercising.  Constipation     2 weeks      Reports tylenol and ibuprofen for back pain. Reports current pain 6-7/10 and dull. Denies bowel or bladder issues. Reports pulling a muscle when exercising one week ago. Reports pain is getting worse. Denies radiation of pain. Reports she is exercising and eating healthy. Denies chest pain and or shortness of breath. Reports she was constipated but she has had a normal bowel the last few days. Past Medical History  Past Medical History:   Diagnosis Date    Insomnia        Surgical History  Past Surgical History:   Procedure Laterality Date    PLASTIC SURGERY, NECK      MA ANESTH,SURGERY OF SHOULDER          Medications  Current Outpatient Prescriptions   Medication Sig Dispense Refill    traZODone (DESYREL) 50 mg tablet TAKE 1-3 TABLETS BY MOUTH AT BEDTIME. 5    phentermine (ADIPEX-P) 37.5 mg tablet Take a half a tablet by mouth for the first 6 days. On day 7 start taking a whole tablet daily in the morning. Avoid late evening dosing. 30 Tab 0    cyclobenzaprine (FLEXERIL) 5 mg tablet Take 1 Tab by mouth nightly. 10 Tab 0    predniSONE (DELTASONE) 20 mg tablet Take 1 Tab by mouth two (2) times a day. 6 Tab 0    estradiol (ESTRACE) 1 mg tablet Take 1 mg by mouth daily.       zolpidem (AMBIEN) 10 mg tablet TAKE 1 TABLET BY MOUTH EVERY OTHER DAY  2    hydrOXYzine HCl (ATARAX) 50 mg tablet TAKE 1/2,1,OR 2 TABLETS BY MOUTH EVERY NIGHT AT BEDTIME FOR INSOMNIA  5       Allergies  Allergies   Allergen Reactions    Dilaudid [Hydromorphone] Hives       Family History  Family History   Problem Relation Age of Onset    Hypertension Mother     Hypertension Father     Cancer Father      prostate       Social History  Social History     Social History    Marital status: SINGLE Spouse name: N/A    Number of children: N/A    Years of education: N/A     Occupational History    Not on file. Social History Main Topics    Smoking status: Never Smoker    Smokeless tobacco: Never Used    Alcohol use No    Drug use: No    Sexual activity: Not on file     Other Topics Concern    Not on file     Social History Narrative       Problem List  Patient Active Problem List   Diagnosis Code    Severe obesity (BMI 35.0-39.9) [facility] E66.01       Review of Systems  Review of Systems   Respiratory: Negative for shortness of breath. Cardiovascular: Negative for chest pain and palpitations. Gastrointestinal: Positive for constipation. Negative for abdominal pain, nausea and vomiting. Musculoskeletal: Positive for back pain. Vital Signs  Vitals:    06/01/18 1317   BP: 115/82   Pulse: 84   Resp: 20   Temp: 98.4 °F (36.9 °C)   TempSrc: Oral   Weight: 178 lb 8 oz (81 kg)   Height: 4' 11\" (1.499 m)   PainSc:   6   PainLoc: Back       Physical Exam  Physical Exam   Constitutional: She is oriented to person, place, and time. HENT:   Mouth/Throat: Oropharynx is clear and moist.   Cardiovascular: Normal rate, regular rhythm and normal heart sounds. Pulmonary/Chest: Effort normal and breath sounds normal. No respiratory distress. Abdominal: Soft. Bowel sounds are normal.   Musculoskeletal: She exhibits no edema or deformity. ROM in back limited. No bruising, swelling, or deformity to back. No point tenderness. Neurological: She is alert and oriented to person, place, and time. Coordination abnormal.   Skin: Skin is warm and dry. Psychiatric: She has a normal mood and affect.  Her behavior is normal.       Diagnostics  Orders Placed This Encounter    KETOROLAC TROMETHAMINE INJ     Order Specific Question:   Dose     Answer:   30 mg     Order Specific Question:   Site     Answer:   RIGHT GLUTEUS     Order Specific Question:   Expiration Date     Answer:   9/1/2019     Order Specific Question:   Lot#     Answer:   [id number]     Order Specific Question:        Answer:   hospira     Order Specific Question:   Charge Quantity? Answer:   1     Order Specific Question:   Perfomed by/Witnessed by: Answer:   Isaac Coronel FRANC     Order Specific Question:   NDC#     Answer:   1359-1540-38    [place] THER/PROPH/DIAG INJECTION, SUBCUT/IM    traZODone (DESYREL) 50 mg tablet     Sig: TAKE 1-3 TABLETS BY MOUTH AT BEDTIME. Refill:  5    phentermine (ADIPEX-P) 37.5 mg tablet     Sig: Take a half a tablet by mouth for the first 6 days. On day 7 start taking a whole tablet daily in the morning. Avoid late evening dosing. Dispense:  30 Tab     Refill:  0    cyclobenzaprine (FLEXERIL) 5 mg tablet     Sig: Take 1 Tab by mouth nightly. Dispense:  10 Tab     Refill:  0    ketorolac (TORADOL) 30 mg/mL (1 mL) injection     Si mL by IntraVENous route once for 1 dose. Dispense:  1 Vial     Refill:  0    predniSONE (DELTASONE) 20 mg tablet     Sig: Take 1 Tab by mouth two (2) times a day. Dispense:  6 Tab     Refill:  0       Results  Results for orders placed or performed during the hospital encounter of 18   EKG, 12 LEAD, INITIAL   Result Value Ref Range    Ventricular Rate 66 BPM    Atrial Rate 66 BPM    P-R Interval 150 ms    QRS Duration 84 ms    Q-T Interval 402 ms    QTC Calculation (Bezet) 421 ms    Calculated P Axis 46 degrees    Calculated R Axis 35 degrees    Calculated T Axis 8 degrees    Diagnosis       Normal sinus rhythm  Normal ECG  No previous ECGs available  Confirmed by Jarred Whitaker MD, Megan Elias ([id number]) on 2018 4:34:06 PM         Assessment and Plan  Diagnoses and all orders for this visit:    1. Acute left-sided low back pain without sciatica  -     cyclobenzaprine (FLEXERIL) 5 mg tablet; Take 1 Tab by mouth nightly. -     ketorolac (TORADOL) 30 mg/mL (1 mL) injection; 1 mL by IntraVENous route once for 1 dose.   -     KETOROLAC TROMETHAMINE INJ  -     [place] THER/PROPH/DIAG INJECTION, SUBCUT/IM    2. BMI 36.0-36.9,adult  -     phentermine (ADIPEX-P) 37.5 mg tablet; Take a half a tablet by mouth for the first 6 days. On day 7 start taking a whole tablet daily in the morning. Avoid late evening dosing. 3. Weight gain, abnormal  -     phentermine (ADIPEX-P) 37.5 mg tablet; Take a half a tablet by mouth for the first 6 days. On day 7 start taking a whole tablet daily in the morning. Avoid late evening dosing. Other orders  -     predniSONE (DELTASONE) 20 mg tablet; Take 1 Tab by mouth two (2) times a day. In office Toradol injection effective. Pain level decreased to 4-5/10 post injection of 20 mins. Medication, side effects, possible allergic reactions and warnings reviewed with patient. Patient verbalized understanding. After care summary printed and reviewed with patient. Plan reviewed with patient. Questions answered. Patient verbalized understanding of plan and is in agreement with plan. Patient to follow up in one week or earlier if symptoms worsen.      YONATHAN QuezadaC

## 2018-06-01 NOTE — PROGRESS NOTES
Chief Complaint   Patient presents with    Follow-up     1 month f/u    Weight Management    Back Pain     states she hurt her back while exercising.  Constipation     2 weeks       Health Maintenance Due   Topic Date Due    DTaP/Tdap/Td series (1 - Tdap) 06/15/1995       Health Maintenance reviewed    1. Have you been to the ER, urgent care clinic since your last visit? Hospitalized since your last visit? No    2. Have you seen or consulted any other health care providers outside of the [address] [place] [address] since your last visit? Include any pap smears or colon screening.  No

## 2018-06-01 NOTE — MR AVS SNAPSHOT
[facility] 
 
 
 [address] [address] [address] [address] [phone] Patient: Daksha Perea MRN: [id number] ZCB:[id number] Visit Information Date & Time Provider Department Dept. Phone Encounter #  
 6/1/2018  1:00 PM Osmany Hernandez NP [address] [phone] [id number] Upcoming Health Maintenance Date Due DTaP/Tdap/Td series (1 - Tdap) 6/15/1995 Influenza Age 5 to Adult 8/1/2018 PAP AKA CERVICAL CYTOLOGY 4/5/2021 Allergies as of 6/1/2018  Review Complete On: 5/3/2018 By: Osmany Hernandez NP Severity Noted Reaction Type Reactions Dilaudid [Hydromorphone] Low 04/05/2018    Hives Current Immunizations  Never Reviewed No immunizations on file. Not reviewed this visit You Were Diagnosed With   
  
 Codes Comments Acute left-sided low back pain without sciatica    -  Primary ICD-10-CM: M54.5 ICD-9-CM: 724.2 BMI 36.0-36.9,adult     ICD-10-CM: W71.22 
ICD-9-CM: V85.36 Weight gain, abnormal     ICD-10-CM: R63.5 ICD-9-CM: 783.1 Vitals BP Pulse Temp Resp Height(growth percentile) Weight(growth percentile) 115/82 (BP 1 Location: Left arm, BP Patient Position: Sitting) 84 98.4 °F (36.9 °C) (Oral) 20 4' 11\" (1.499 m) 178 lb 8 oz (81 kg) BMI OB Status Smoking Status 36.05 kg/m2 Hysterectomy Never Smoker BMI and BSA Data Body Mass Index Body Surface Area 36.05 kg/m 2 1.84 m 2 Preferred Pharmacy Pharmacy Name Phone [id number] Alex Muñoz, [address] [facility] [facility] [phone] Your Updated Medication List  
  
   
This list is accurate as of 6/1/18  2:14 PM.  Always use your most recent med list.  
  
  
  
  
 cyclobenzaprine 5 mg tablet Commonly known as:  FLEXERIL Take 1 Tab by mouth nightly. estradiol 1 mg tablet Commonly known as:  ESTRACE Take 1 mg by mouth daily. hydrOXYzine HCl 50 mg tablet Commonly known as:  ATARAX TAKE 1/2,1,OR 2 TABLETS BY MOUTH EVERY NIGHT AT BEDTIME FOR INSOMNIA  
  
 ketorolac 30 mg/mL (1 mL) injection Commonly known as:  TORADOL  
1 mL by IntraVENous route once for 1 dose. phentermine 37.5 mg tablet Commonly known as:  ADIPEX-P Take a half a tablet by mouth for the first 6 days. On day 7 start taking a whole tablet daily in the morning. Avoid late evening dosing. predniSONE 20 mg tablet Commonly known as:  Darius Ferraris Take 1 Tab by mouth two (2) times a day. traZODone 50 mg tablet Commonly known as:  DESYREL  
TAKE 1-3 TABLETS BY MOUTH AT BEDTIME.  
  
 zolpidem 10 mg tablet Commonly known as:  AMBIEN  
TAKE 1 TABLET BY MOUTH EVERY OTHER DAY Prescriptions Printed Refills  
 phentermine (ADIPEX-P) 37.5 mg tablet 0 Sig: Take a half a tablet by mouth for the first 6 days. On day 7 start taking a whole tablet daily in the morning. Avoid late evening dosing. Class: Print Prescriptions Sent to Pharmacy Refills  
 cyclobenzaprine (FLEXERIL) 5 mg tablet 0 Sig: Take 1 Tab by mouth nightly. Class: Normal  
 Pharmacy: [facility] [address] [place] [address],[address] [facility] [address] [place] [address] Ph #: [phone] Route: Oral  
 predniSONE (DELTASONE) 20 mg tablet 0 Sig: Take 1 Tab by mouth two (2) times a day. Class: Normal  
 Pharmacy: [facility] [address] [place] [address],[address] [facility] [place] [address] Ph #: [phone] Route: Oral  
  
We Performed the Following KETOROLAC TROMETHAMINE INJ [ [facility]] WI THER/PROPH/DIAG INJECTION, SUBCUT/IM [id number] CPT(R)] Patient Instructions Please contact our office if you have any questions about your visit today. Introducing [facility]! Dear Nancy Lopes: 
Thank you for requesting a [employer] account. Our records indicate that you already have an active [employer] account. You can access your account anytime at [email or web address] Did you know that you can access your hospital and ER discharge instructions at any time in SphereUp? You can also review all of your test results from your hospital stay or ER visit. Additional Information If you have questions, please visit the Frequently Asked Questions section of the Sp[employer] website at [email or web address] Remember, SphereUp is NOT to be used for urgent needs. For medical emergencies, dial 911. Now available from your iPhone and Android! Please provide this summary of care documentation to your next provider. Your primary care clinician is listed as Kinza Abreu. If you have any questions after today's visit, please call [phone].

## 2018-06-01 NOTE — LETTER
NOTIFICATION RETURN TO WORK / SCHOOL 
 
6/1/2018 2:16 PM 
 
Ms. Devorah Angulo [address] [address] To Whom It May Concern: 
 
Devorah Angulo is currently under the care of Gómez Gillespie. She will return to work/school on: [id number] If there are questions or concerns please have the patient contact our office.  
 
 
 
Sincerely, 
 
 
Geni Slade NP

## 2018-06-14 ENCOUNTER — TELEPHONE (OUTPATIENT)
Dept: FAMILY MEDICINE CLINIC | Age: 43
End: 2018-06-14

## 2018-06-14 NOTE — TELEPHONE ENCOUNTER
Pt called and stated that she was seen last week for back pain. Pt stated that her back was still in pain and medication that was given is not working. Pt stated that NP stated if medication did not work she would call her in something different. Please advise.

## 2018-06-14 NOTE — TELEPHONE ENCOUNTER
Patient message has been sent to PCP in reference to unresolved back pain. Patient states that she was seen in office 6/1/2018 and received pain medication Prednisone and Cyclobenzaprine to take at home along with an injection of Toradol 30 mg in R. Gluteus that was done in office 6/1/2018.

## 2018-06-19 NOTE — TELEPHONE ENCOUNTER
Attempt to reach patient. Left message with no information given. Requesting she return call.  Yessy Montoya

## 2018-07-09 ENCOUNTER — OFFICE VISIT (OUTPATIENT)
Dept: FAMILY MEDICINE CLINIC | Age: 43
End: 2018-07-09

## 2018-07-09 VITALS
TEMPERATURE: 97.2 F | HEIGHT: 59 IN | DIASTOLIC BLOOD PRESSURE: 86 MMHG | WEIGHT: 177 LBS | BODY MASS INDEX: 35.68 KG/M2 | SYSTOLIC BLOOD PRESSURE: 139 MMHG | HEART RATE: 87 BPM | OXYGEN SATURATION: 98 % | RESPIRATION RATE: 16 BRPM

## 2018-07-09 DIAGNOSIS — R63.5 WEIGHT GAIN, ABNORMAL: ICD-10-CM

## 2018-07-09 RX ORDER — PHENTERMINE HYDROCHLORIDE 37.5 MG/1
37.5 TABLET ORAL DAILY
Qty: 30 TAB | Refills: 0 | Status: SHIPPED | OUTPATIENT
Start: 2018-07-09 | End: 2019-02-11 | Stop reason: ALTCHOICE

## 2018-07-09 NOTE — PROGRESS NOTES
Chief Complaint   Patient presents with    Follow-up     Patient last seen in office 6/1/2018 for acute left sided low back pain w/o sciatica     1. Have you been to the ER, urgent care clinic since your last visit? Hospitalized since your last visit? No    2. Have you seen or consulted any other health care providers outside of the [address] [place] [address] since your last visit? Include any pap smears or colon screening.  No      Health Maintenance Due   Topic Date Due    DTaP/Tdap/Td series (1 - Tdap) 06/15/1995

## 2018-07-09 NOTE — PATIENT INSTRUCTIONS
Please contact our office if you have any questions about your visit today. Body Mass Index: Care Instructions  Your Care Instructions    Body mass index (BMI) can help you see if your weight is raising your risk for health problems. It uses a formula to compare how much you weigh with how tall you are. · A BMI lower than 18.5 is considered underweight. · A BMI between 18.5 and 24.9 is considered healthy. · A BMI between 25 and 29.9 is considered overweight. A BMI of 30 or higher is considered obese. If your BMI is in the normal range, it means that you have a lower risk for weight-related health problems. If your BMI is in the overweight or obese range, you may be at increased risk for weight-related health problems, such as high blood pressure, heart disease, stroke, arthritis or joint pain, and diabetes. If your BMI is in the underweight range, you may be at increased risk for health problems such as fatigue, lower protection (immunity) against illness, muscle loss, bone loss, hair loss, and hormone problems. BMI is just one measure of your risk for weight-related health problems. You may be at higher risk for health problems if you are not active, you eat an unhealthy diet, or you drink too much alcohol or use tobacco products. Follow-up care is a key part of your treatment and safety. Be sure to make and go to all appointments, and call your doctor if you are having problems. It's also a good idea to know your test results and keep a list of the medicines you take. How can you care for yourself at home? · Practice healthy eating habits. This includes eating plenty of fruits, vegetables, whole grains, lean protein, and low-fat dairy. · If your doctor recommends it, get more exercise. Walking is a good choice. Bit by bit, increase the amount you walk every day. Try for at least 30 minutes on most days of the week. · Do not smoke. Smoking can increase your risk for health problems.  If you need help quitting, talk to your doctor about stop-smoking programs and medicines. These can increase your chances of quitting for good. · Limit alcohol to 2 drinks a day for men and 1 drink a day for women. Too much alcohol can cause health problems. If you have a BMI higher than 25  · Your doctor may do other tests to check your risk for weight-related health problems. This may include measuring the distance around your waist. A waist measurement of more than 40 inches in men or 35 inches in women can increase the risk of weight-related health problems. · Talk with your doctor about steps you can take to stay healthy or improve your health. You may need to make lifestyle changes to lose weight and stay healthy, such as changing your diet and getting regular exercise. If you have a BMI lower than 18.5  · Your doctor may do other tests to check your risk for health problems. · Talk with your doctor about steps you can take to stay healthy or improve your health. You may need to make lifestyle changes to gain or maintain weight and stay healthy, such as getting more healthy foods in your diet and doing exercises to build muscle. Where can you learn more? Go to http://bashir-princess.info/. Enter S176 in the search box to learn more about \"Body Mass Index: Care Instructions. \"  Current as of: October 13, 2016  Content Version: 11.4  © 1634-3516 Healthwise, Incorporated. Care instructions adapted under license by [employer] (which disclaims liability or warranty for this information). If you have questions about a medical condition or this instruction, always ask your healthcare professional. [place] [address] any warranty or liability for your use of this information. Abnormal Weight Gain: Care Instructions  Your Care Instructions    There are two types of weight gain-normal and abnormal. Normal weight gain is usually caused by eating too much or exercising too little. It can also happen as you get older. But abnormal weight gain has other causes. It can be caused by a problem with your thyroid gland, called hypothyroidism. Or it can be caused by a problem with your adrenal glands, called Cushing's syndrome. Or your body could be holding too much fluid because of kidney, liver, or heart problems. In some cases, a medicine you take can cause you to gain weight. You can work with your doctor to find out the cause of your weight gain. You will probably need tests to do this. Follow-up care is a key part of your treatment and safety. Be sure to make and go to all appointments, and call your doctor if you are having problems. It's also a good idea to know your test results and keep a list of the medicines you take. How can you care for yourself at home? · Weigh yourself at the same time every day. It's best to do it first thing in the morning after you empty your bladder. Be sure to always wear the same amount of clothing. · Write down any changes in your weight and the possible causes. Discuss these with your doctor. · Your doctor may want you to change your diet and exercise habits. A good way to lose weight is to reduce calories and increase exercise. · Walking is an easy way to get exercise. Try to walk a little longer every day. You also may want to swim, bike, or do other activities. · Ask your doctor if you should see a dietitian. This is a person who can help you plan meals that work best for your lifestyle. · If your doctor prescribed medicines, take them exactly as prescribed. Call your doctor if you think you are having a problem with your medicine. You will get more details on the specific medicines your doctor prescribes. When should you call for help? Watch closely for changes in your health, and be sure to contact your doctor if:  ? · You do not get better as expected. ? · You continue to gain weight. Where can you learn more?   Go to http://bashir-princess.info/. Enter A175 in the search box to learn more about \"Abnormal Weight Gain: Care Instructions. \"  Current as of: October 13, 2016  Content Version: 11.4  © 6818-2712 Healthwise, Incorporated. Care instructions adapted under license by [employer] (which disclaims liability or warranty for this information). If you have questions about a medical condition or this instruction, always ask your healthcare professional. [place] [address] any warranty or liability for your use of this information.

## 2018-07-09 NOTE — PROGRESS NOTES
HPI  Elif Redmond is a 39 y.o. female  Chief Complaint   Patient presents with    Follow-up     Patient last seen in office 6/1/2018 for acute left sided low back pain w/o sciatica     Reports she is out of her phentermine. States she has had difficulty with exercising since she injured her back. Admits to walking but states she has decreased this. Reports taking phentermine before breakfast.     Past Medical History  Past Medical History:   Diagnosis Date    Insomnia        Surgical History  Past Surgical History:   Procedure Laterality Date    PLASTIC SURGERY, NECK      [place] ANESTH,SURGERY OF SHOULDER          Medications  Current Outpatient Prescriptions   Medication Sig Dispense Refill    phentermine (ADIPEX-P) 37.5 mg tablet Take 1 Tab by mouth daily. Max Daily Amount: 37.5 mg. 30 Tab 0    traZODone (DESYREL) 50 mg tablet TAKE 1-3 TABLETS BY MOUTH AT BEDTIME. 5    estradiol (ESTRACE) 1 mg tablet Take 1 mg by mouth daily.  hydrOXYzine HCl (ATARAX) 50 mg tablet TAKE 1/2,1,OR 2 TABLETS BY MOUTH EVERY NIGHT AT BEDTIME FOR INSOMNIA  5    cyclobenzaprine (FLEXERIL) 5 mg tablet Take 1 Tab by mouth nightly. 10 Tab 0    predniSONE (DELTASONE) 20 mg tablet Take 1 Tab by mouth two (2) times a day. 6 Tab 0    zolpidem (AMBIEN) 10 mg tablet TAKE 1 TABLET BY MOUTH EVERY OTHER DAY  2       Allergies  Allergies   Allergen Reactions    Dilaudid [Hydromorphone] Hives       Family History  Family History   Problem Relation Age of Onset    Hypertension Mother     Hypertension Father     Cancer Father      prostate       Social History  Social History     Social History    Marital status: SINGLE     Spouse name: N/A    Number of children: N/A    Years of education: N/A     Occupational History    Not on file.      Social History Main Topics    Smoking status: Never Smoker    Smokeless tobacco: Never Used    Alcohol use No    Drug use: No    Sexual activity: Not on file     Other Topics Concern    Not on file     Social History Narrative       Problem List  Patient Active Problem List   Diagnosis Code    Severe obesity (BMI 35.0-39.9) ([facility]) E66.01       Review of Systems  Review of Systems   Constitutional: Negative for malaise/fatigue. Cardiovascular: Negative for chest pain. Gastrointestinal: Negative for abdominal pain, nausea and vomiting. Musculoskeletal: Positive for back pain. Vital Signs  Vitals:    07/09/18 1118   BP: 139/86   Pulse: 87   Resp: 16   Temp: 97.2 °F (36.2 °C)   TempSrc: Oral   SpO2: 98%   Weight: 177 lb (80.3 kg)   Height: 4' 11\" (1.499 m)   PainSc:   0 - No pain       Physical Exam  Physical Exam   Constitutional: She is oriented to person, place, and time. HENT:   Mouth/Throat: Oropharynx is clear and moist.   Cardiovascular: Normal rate, regular rhythm and normal heart sounds. No murmur heard. Pulmonary/Chest: Effort normal and breath sounds normal. No respiratory distress. Abdominal: Soft. Bowel sounds are normal. She exhibits no distension. There is no tenderness. There is no rebound, no guarding and no CVA tenderness. Neurological: She is alert and oriented to person, place, and time. Vitals reviewed. Diagnostics  Orders Placed This Encounter    REFERRAL TO DIETITIAN     Referral Priority:   Routine     Referral Type:   Consultation     Referral Reason:   Specialty Services Required     Requested Specialty:   Dietitian    phentermine (ADIPEX-P) 37.5 mg tablet     Sig: Take 1 Tab by mouth daily.  Max Daily Amount: 37.5 mg.     Dispense:  30 Tab     Refill:  0       Results  Results for orders placed or performed during the hospital encounter of 04/05/18   EKG, 12 LEAD, INITIAL   Result Value Ref Range    Ventricular Rate 66 BPM    Atrial Rate 66 BPM    P-R Interval 150 ms    QRS Duration 84 ms    Q-T Interval 402 ms    QTC Calculation (Bezet) 421 ms    Calculated P Axis 46 degrees    Calculated R Axis 35 degrees    Calculated T Axis 8 degrees    Diagnosis Normal sinus rhythm  Normal ECG  No previous ECGs available  Confirmed by Estephanie Khanna MD, Cherise Conner ([id number]) on 4/6/2018 4:34:06 PM           Assessment and Plan  Diagnoses and all orders for this visit:    1. BMI 36.0-36.9,adult  -     phentermine (ADIPEX-P) 37.5 mg tablet; Take 1 Tab by mouth daily. Max Daily Amount: 37.5 mg.  -     REFERRAL TO DIETITIAN    2. Weight gain, abnormal  -     phentermine (ADIPEX-P) 37.5 mg tablet; Take 1 Tab by mouth daily. Max Daily Amount: 37.5 mg.  -     REFERRAL TO DIETITIAN    Patient to continue to walk and do exercise like chair weights. Discussed stress and the impact on her weight. Encouraged stress reduction. After care summary printed and reviewed with patient. Plan reviewed with patient. Questions answered. Patient verbalized understanding of plan and is in agreement with plan. Patient to follow up in one month or earlier if symptoms worsen.     Titi Templeton, PIETER-C

## 2018-07-09 NOTE — MR AVS SNAPSHOT
[address] [facility] 
 
 
 [address] [address] [address] [address] [phone] Patient: Helen White MRN: [id number] VJO:4/85/9038 Visit Information Date & Time Provider Department Dept. Phone Encounter #  
 7/9/2018 11:00 AM Kiki Vazquez NP [address] [phone] [id number] Upcoming Health Maintenance Date Due DTaP/Tdap/Td series (1 - Tdap) 6/15/1995 Influenza Age 5 to Adult 8/1/2018 PAP AKA CERVICAL CYTOLOGY 4/5/2021 Allergies as of 7/9/2018  Review Complete On: 7/9/2018 By: Kiki Vazquez NP Severity Noted Reaction Type Reactions Dilaudid [Hydromorphone] Low 04/05/2018    Hives Current Immunizations  Never Reviewed No immunizations on file. Not reviewed this visit You Were Diagnosed With   
  
 Codes Comments BMI 36.0-36.9,adult     ICD-10-CM: W65.96 
ICD-9-CM: V85.36 Weight gain, abnormal     ICD-10-CM: R63.5 ICD-9-CM: 783.1 Vitals BP Pulse Temp Resp Height(growth percentile) Weight(growth percentile) 139/86 (BP 1 Location: Right arm, BP Patient Position: Sitting) 87 97.2 °F (36.2 °C) (Oral) 16 4' 11\" (1.499 m) 177 lb (80.3 kg) SpO2 BMI OB Status Smoking Status 98% 35.75 kg/m2 Hysterectomy Never Smoker BMI and BSA Data Body Mass Index Body Surface Area 35.75 kg/m 2 1.83 m 2 Preferred Pharmacy Pharmacy Name Phone [id number] [address] [facility] [phone] Your Updated Medication List  
  
   
This list is accurate as of 7/9/18 12:01 PM.  Always use your most recent med list.  
  
  
  
  
 cyclobenzaprine 5 mg tablet Commonly known as:  FLEXERIL Take 1 Tab by mouth nightly. estradiol 1 mg tablet Commonly known as:  ESTRACE Take 1 mg by mouth daily. hydrOXYzine HCl 50 mg tablet Commonly known as:  ATARAX TAKE 1/2,1,OR 2 TABLETS BY MOUTH EVERY NIGHT AT BEDTIME FOR INSOMNIA  
  
 phentermine 37.5 mg tablet Commonly known as:  ADIPEX-P Take 1 Tab by mouth daily. Max Daily Amount: 37.5 mg.  
  
 predniSONE 20 mg tablet Commonly known as:  Lexus Beam Take 1 Tab by mouth two (2) times a day. traZODone 50 mg tablet Commonly known as:  DESYREL  
TAKE 1-3 TABLETS BY MOUTH AT BEDTIME.  
  
 zolpidem 10 mg tablet Commonly known as:  AMBIEN  
TAKE 1 TABLET BY MOUTH EVERY OTHER DAY Prescriptions Printed Refills  
 phentermine (ADIPEX-P) 37.5 mg tablet 0 Sig: Take 1 Tab by mouth daily. Max Daily Amount: 37.5 mg.  
 Class: Print Route: Oral  
  
We Performed the Following REFERRAL TO DIETITIAN [[id number]] Referral Information Referral ID Referred By Referred To  
  
 [id number] Neema BRIZUELA Not Available Visits Status Start Date End Date 1 New Request 7/9/18 7/9/19 If your referral has a status of pending review or denied, additional information will be sent to support the outcome of this decision. Patient Instructions Please contact our office if you have any questions about your visit today. Body Mass Index: Care Instructions Your Care Instructions Body mass index (BMI) can help you see if your weight is raising your risk for health problems. It uses a formula to compare how much you weigh with how tall you are. · A BMI lower than 18.5 is considered underweight. · A BMI between 18.5 and 24.9 is considered healthy. · A BMI between 25 and 29.9 is considered overweight. A BMI of 30 or higher is considered obese. If your BMI is in the normal range, it means that you have a lower risk for weight-related health problems. If your BMI is in the overweight or obese range, you may be at increased risk for weight-related health problems, such as high blood pressure, heart disease, stroke, arthritis or joint pain, and diabetes.  If your BMI is in the underweight range, you may be at increased risk for health problems such as fatigue, lower protection (immunity) against illness, muscle loss, bone loss, hair loss, and hormone problems. BMI is just one measure of your risk for weight-related health problems. You may be at higher risk for health problems if you are not active, you eat an unhealthy diet, or you drink too much alcohol or use tobacco products. Follow-up care is a key part of your treatment and safety. Be sure to make and go to all appointments, and call your doctor if you are having problems. It's also a good idea to know your test results and keep a list of the medicines you take. How can you care for yourself at home? · Practice healthy eating habits. This includes eating plenty of fruits, vegetables, whole grains, lean protein, and low-fat dairy. · If your doctor recommends it, get more exercise. Walking is a good choice. Bit by bit, increase the amount you walk every day. Try for at least 30 minutes on most days of the week. · Do not smoke. Smoking can increase your risk for health problems. If you need help quitting, talk to your doctor about stop-smoking programs and medicines. These can increase your chances of quitting for good. · Limit alcohol to 2 drinks a day for men and 1 drink a day for women. Too much alcohol can cause health problems. If you have a BMI higher than 25 · Your doctor may do other tests to check your risk for weight-related health problems. This may include measuring the distance around your waist. A waist measurement of more than 40 inches in men or 35 inches in women can increase the risk of weight-related health problems. · Talk with your doctor about steps you can take to stay healthy or improve your health. You may need to make lifestyle changes to lose weight and stay healthy, such as changing your diet and getting regular exercise. If you have a BMI lower than 18.5 · Your doctor may do other tests to check your risk for health problems. · Talk with your doctor about steps you can take to stay healthy or improve your health. You may need to make lifestyle changes to gain or maintain weight and stay healthy, such as getting more healthy foods in your diet and doing exercises to build muscle. Where can you learn more? Go to http://bashir-princess.info/. Enter S176 in the search box to learn more about \"Body Mass Index: Care Instructions. \" Current as of: October 13, 2016 Content Version: 11.4 © 1651-4875 [employer]. Care instructions adapted under license by [employer] (which disclaims liability or warranty for this information). If you have questions about a medical condition or this instruction, always ask your healthcare professional. [address] any warranty or liability for your use of this information. Abnormal Weight Gain: Care Instructions Your Care Instructions There are two types of weight gain-normal and abnormal. Normal weight gain is usually caused by eating too much or exercising too little. It can also happen as you get older. But abnormal weight gain has other causes. It can be caused by a problem with your thyroid gland, called hypothyroidism. Or it can be caused by a problem with your adrenal glands, called Cushing's syndrome. Or your body could be holding too much fluid because of kidney, liver, or heart problems. In some cases, a medicine you take can cause you to gain weight. You can work with your doctor to find out the cause of your weight gain. You will probably need tests to do this. Follow-up care is a key part of your treatment and safety. Be sure to make and go to all appointments, and call your doctor if you are having problems. It's also a good idea to know your test results and keep a list of the medicines you take. How can you care for yourself at home? · Weigh yourself at the same time every day. It's best to do it first thing in the morning after you empty your bladder. Be sure to always wear the same amount of clothing. · Write down any changes in your weight and the possible causes. Discuss these with your doctor. · Your doctor may want you to change your diet and exercise habits. A good way to lose weight is to reduce calories and increase exercise. · Walking is an easy way to get exercise. Try to walk a little longer every day. You also may want to swim, bike, or do other activities. · Ask your doctor if you should see a dietitian. This is a person who can help you plan meals that work best for your lifestyle. · If your doctor prescribed medicines, take them exactly as prescribed. Call your doctor if you think you are having a problem with your medicine. You will get more details on the specific medicines your doctor prescribes. When should you call for help? Watch closely for changes in your health, and be sure to contact your doctor if: 
? · You do not get better as expected. ? · You continue to gain weight. Where can you learn more? Go to http://bashir-princess.info/. Enter A175 in the search box to learn more about \"Abnormal Weight Gain: Care Instructions. \" Current as of: October 13, 2016 Content Version: 11.4 © 9349-3489 [employer]wise[employer]. Care instructions adapted under license by [employer] (which disclaims liability or warranty for this information). If you have questions about a medical condition or this instruction, always ask your healthcare professional. [place] [address] any warranty or liability for your use of this information. Introducing [facility]! Dear Bob Robertson: 
Thank you for requesting a [employer] account. Our records indicate that you already have an active [employer] account. You can access your account anytime at [email or web address] [employer]/Pathogen Systems Did you know that you can access your hospital and ER discharge instructions at any time in [employer]? You can also review all of your test results from your hospital stay or ER visit. Additional Information If you have questions, please visit the Frequently Asked Questions section of the [employer] website at [email or web address] Remember, [employer] is NOT to be used for urgent needs. For medical emergencies, dial 911. Now available from your iPhone and Android! Please provide this summary of care documentation to your next provider. Your primary care clinician is listed as Carlos Ibarra. If you have any questions after today's visit, please call [phone].

## 2018-07-09 NOTE — LETTER
NOTIFICATION RETURN TO WORK / SCHOOL 
 
7/9/2018 12:10 PM 
 
Ms. Dereck Michael [address] [phone] To Whom It May Concern: 
 
Dereck Michael is currently under the care of Gómez Gillespie. She will return to work/school on: 07/09/2018 If there are questions or concerns please have the patient contact our office.  
 
 
 
Sincerely, 
 
 
Leslie Hayden NP

## 2018-07-31 ENCOUNTER — APPOINTMENT (OUTPATIENT)
Dept: NUTRITION | Age: 43
End: 2018-07-31

## 2018-08-16 ENCOUNTER — APPOINTMENT (OUTPATIENT)
Dept: NUTRITION | Age: 43
End: 2018-08-16

## 2018-08-20 ENCOUNTER — OFFICE VISIT (OUTPATIENT)
Dept: FAMILY MEDICINE CLINIC | Age: 43
End: 2018-08-20

## 2018-08-20 VITALS
WEIGHT: 178.25 LBS | HEART RATE: 73 BPM | TEMPERATURE: 97.1 F | HEIGHT: 59 IN | RESPIRATION RATE: 20 BRPM | SYSTOLIC BLOOD PRESSURE: 139 MMHG | DIASTOLIC BLOOD PRESSURE: 85 MMHG | BODY MASS INDEX: 35.93 KG/M2

## 2018-08-20 DIAGNOSIS — R23.8 SKIN IRRITATION: ICD-10-CM

## 2018-08-20 DIAGNOSIS — R63.5 WEIGHT GAIN, ABNORMAL: ICD-10-CM

## 2018-08-20 NOTE — LETTER
NOTIFICATION RETURN TO WORK / SCHOOL 
 
8/20/2018 2:14 PM 
 
Ms. Janie Davalos [address] [phone] To Whom It May Concern: 
 
Janie Davalos is currently under the care of Gómez Gillespie. She will return to work/school on: 08/20/2018 If there are questions or concerns please have the patient contact our office.  
 
 
 
Sincerely, 
 
 
Fabi Hummel NP

## 2018-08-20 NOTE — PROGRESS NOTES
Chief Complaint   Patient presents with    Follow-up     1 month f/u    Weight Management       Health Maintenance Due   Topic Date Due    DTaP/Tdap/Td series (1 - Tdap) 06/15/1995    Influenza Age 5 to Adult  08/01/2018       Health Maintenance reviewed       1. Have you been to the ER, urgent care clinic since your last visit? Hospitalized since your last visit? No    2. Have you seen or consulted any other health care providers outside of the [address] [place] [address] since your last visit? Include any pap smears or colon screening.  Yes When: 8/18 Where: allergist Reason for visit: ov      Learning and abuse screening updated

## 2018-08-20 NOTE — MR AVS SNAPSHOT
[address] [facility] 
 
 
 [address] [address] [place] [phone] [phone] Patient: Sara Santacruz MRN: [id number] GDE:8/83/0894 Visit Information Date & Time Provider Department Dept. Phone Encounter #  
 8/20/2018  1:30 PM Minda Cote NP [facility] [phone] [id number] Upcoming Health Maintenance Date Due DTaP/Tdap/Td series (1 - Tdap) 6/15/1995 Influenza Age 5 to Adult 9/20/2018* PAP AKA CERVICAL CYTOLOGY 4/5/2021 *Topic was postponed. The date shown is not the original due date. Allergies as of 8/20/2018  Review Complete On: 8/20/2018 By: Minda Cote NP Severity Noted Reaction Type Reactions Dilaudid [Hydromorphone] Low 04/05/2018    Hives Current Immunizations  Never Reviewed No immunizations on file. Not reviewed this visit You Were Diagnosed With   
  
 Codes Comments BMI 36.0-36.9,adult    -  Primary ICD-10-CM: S60.77 
ICD-9-CM: V85.36 Weight gain, abnormal     ICD-10-CM: R63.5 ICD-9-CM: 783.1 Skin irritation     ICD-10-CM: R23.8 ICD-9-CM: 709.9 Vitals BP Pulse Temp Resp Height(growth percentile) Weight(growth percentile) 139/85 (BP 1 Location: Left arm, BP Patient Position: Sitting) 73 97.1 °F (36.2 °C) (Oral) 20 4' 11\" (1.499 m) 178 lb 4 oz (80.9 kg) BMI OB Status Smoking Status 36 kg/m2 Hysterectomy Never Smoker BMI and BSA Data Body Mass Index Body Surface Area  
 36 kg/m 2 1.84 m 2 Preferred Pharmacy Pharmacy Name Phone [id number] [address], [address] [facility],[address] [facility] [phone] Your Updated Medication List  
  
   
This list is accurate as of 8/20/18  2:09 PM.  Always use your most recent med list.  
  
  
  
  
 estradiol 1 mg tablet Commonly known as:  ESTRACE Take 1 mg by mouth daily. hydrOXYzine HCl 50 mg tablet Commonly known as:  ATARAX TAKE 1/2,1,OR 2 TABLETS BY MOUTH EVERY NIGHT AT BEDTIME FOR INSOMNIA  
  
 phentermine 37.5 mg tablet Commonly known as:  ADIPEX-P Take 1 Tab by mouth daily. Max Daily Amount: 37.5 mg. QVAR 40 mcg/actuation [employer] Generic drug:  beclomethasone Take 1 Puff by inhalation two (2) times a day. traZODone 50 mg tablet Commonly known as:  DESYREL  
TAKE 1-3 TABLETS BY MOUTH AT BEDTIME.  
  
 zolpidem 10 mg tablet Commonly known as:  AMBIEN  
TAKE 1 TABLET BY MOUTH EVERY OTHER DAY To-Do List   
 08/21/2018 10:30 AM  
  Appointment with Keila Mondragon at [facility] Patient Instructions Please contact our office if you have any questions about your visit today. Introducing [facility]! Dear Alix Mejia: 
Thank you for requesting a [employer] account. Our records indicate that you already have an active [employer] account. You can access your account anytime at [email or web address] Did you know that you can access your hospital and ER discharge instructions at any time in [employer]Soup? You can also review all of your test results from your hospital stay or ER visit. Additional Information If you have questions, please visit the Frequently Asked Questions section of the [employer] website at [email or web address] Remember, [employer] is NOT to be used for urgent needs. For medical emergencies, dial 911. Now available from your iPhone and Android! Please provide this summary of care documentation to your next provider. Your primary care clinician is listed as Daphney Sinclair. If you have any questions after today's visit, please call [phone].

## 2018-08-20 NOTE — PROGRESS NOTES
HPI  Dane Suazo is a 39 y.o. female  Chief Complaint   Patient presents with    Follow-up     1 month f/u    Weight Management   Reports she using dumb bells, leg curls, and some minimal walking due to her back hurting. Reports she has had some irritation over here incision lines from her  when she works out due to the sweat. Reports incision lines are not red and states they are not open. Reports she sees dietician tomorrow. Reports her weight goes up and down. She is out of the phentermine. Past Medical History  Past Medical History:   Diagnosis Date    Insomnia        Surgical History  Past Surgical History:   Procedure Laterality Date    PLASTIC SURGERY, NECK      CT ANESTH,SURGERY OF SHOULDER          Medications  Current Outpatient Prescriptions   Medication Sig Dispense Refill    beclomethasone (QVAR) 40 mcg/actuation aero Take 1 Puff by inhalation two (2) times a day.  phentermine (ADIPEX-P) 37.5 mg tablet Take 1 Tab by mouth daily. Max Daily Amount: 37.5 mg. 30 Tab 0    traZODone (DESYREL) 50 mg tablet TAKE 1-3 TABLETS BY MOUTH AT BEDTIME. 5    estradiol (ESTRACE) 1 mg tablet Take 1 mg by mouth daily.  zolpidem (AMBIEN) 10 mg tablet TAKE 1 TABLET BY MOUTH EVERY OTHER DAY  2    hydrOXYzine HCl (ATARAX) 50 mg tablet TAKE 1/2,1,OR 2 TABLETS BY MOUTH EVERY NIGHT AT BEDTIME FOR INSOMNIA  5       Allergies  Allergies   Allergen Reactions    Dilaudid [Hydromorphone] Hives       Family History  Family History   Problem Relation Age of Onset    Hypertension Mother     Hypertension Father     Cancer Father      prostate       Social History  Social History     Social History    Marital status: SINGLE     Spouse name: N/A    Number of children: N/A    Years of education: N/A     Occupational History    Not on file.      Social History Main Topics    Smoking status: Never Smoker    Smokeless tobacco: Never Used    Alcohol use No    Drug use: No    Sexual activity: Not on file     Other Topics Concern    Not on file     Social History Narrative       Problem List  Patient Active Problem List   Diagnosis Code    Severe obesity (BMI 35.0-39.9) ([facility] 75.) E66.01       Review of Systems  Review of Systems   Constitutional: Negative for chills and fever. Respiratory: Negative for shortness of breath. Cardiovascular: Negative for chest pain and palpitations. Gastrointestinal: Negative for abdominal pain, blood in stool, diarrhea, nausea and vomiting. Genitourinary: Negative for hematuria. Musculoskeletal: Positive for back pain. Negative for falls. Skin: Positive for itching. Psychiatric/Behavioral: Negative for depression, substance abuse and suicidal ideas. Vital Signs  Vitals:    08/20/18 1345   BP: 139/85   Pulse: 73   Resp: 20   Temp: 97.1 °F (36.2 °C)   TempSrc: Oral   Weight: 178 lb 4 oz (80.9 kg)   Height: 4' 11\" (1.499 m)   PainSc:   0 - No pain       Physical Exam  Physical Exam   Constitutional: She is oriented to person, place, and time. HENT:   Mouth/Throat: Oropharynx is clear and moist.   Eyes: Pupils are equal, round, and reactive to light. Cardiovascular: Normal rate, regular rhythm and normal heart sounds. No murmur heard. Pulmonary/Chest: Effort normal and breath sounds normal. No respiratory distress. Abdominal: Soft. Bowel sounds are normal. She exhibits no distension and no mass. There is no tenderness. There is no rebound and no guarding. Neurological: She is alert and oriented to person, place, and time. Skin: Skin is warm and dry. No rash noted. No erythema. Old lower abdominal incisions healed with no redness, no rash, and no open areas. Psychiatric: She has a normal mood and affect. Her behavior is normal. Judgment and thought content normal.   Vitals reviewed. Diagnostics  Orders Placed This Encounter    beclomethasone (QVAR) 40 mcg/actuation aero     Sig: Take 1 Puff by inhalation two (2) times a day. Results  Results for orders placed or performed during the hospital encounter of 04/05/18   EKG, 12 LEAD, INITIAL   Result Value Ref Range    Ventricular Rate 66 BPM    Atrial Rate 66 BPM    P-R Interval 150 ms    QRS Duration 84 ms    Q-T Interval 402 ms    QTC Calculation (Bezet) 421 ms    Calculated P Axis 46 degrees    Calculated R Axis 35 degrees    Calculated T Axis 8 degrees    Diagnosis       Normal sinus rhythm  Normal ECG  No previous ECGs available  Confirmed by Octaviano Jaffe MD, Bri Mars ([id number]) on 4/6/2018 4:34:06 PM             Assessment and Plan  Diagnoses and all orders for this visit:    1. BMI 36.0-36.9,adult  -     REFERRAL TO WEIGHT LOSS    2. Weight gain, abnormal  -     REFERRAL TO WEIGHT LOSS    3. Skin irritation    Medical and surgical weight loss options discussed with patient. Patient to keep old incisions clean and dry and to use hydrocortisone cream for any itching. After care summary printed and reviewed with patient. Plan reviewed with patient. Questions answered. Patient verbalized understanding of plan and is in agreement with plan. Patient to follow up in three months or earlier if symptoms worsen.      LARRY Hooker

## 2018-08-21 ENCOUNTER — APPOINTMENT (OUTPATIENT)
Dept: NUTRITION | Age: 43
End: 2018-08-21

## 2018-08-28 ENCOUNTER — HOSPITAL ENCOUNTER (OUTPATIENT)
Dept: NUTRITION | Age: 43
Discharge: HOME | End: 2018-08-28
Payer: COMMERCIAL

## 2018-08-28 PROCEDURE — 97802 MEDICAL NUTRITION INDIV IN: CPT

## 2018-08-28 NOTE — PROGRESS NOTES
[address] [place] [address] Av, [address] [facility], [address] [facility] [address] Phone: [phone]  Fax: [phone] Nutrition Assessment  Medical Nutrition Therapy Outpatient Initial Evaluation Patient Name: Shaun Tyler : 1975 Treatment Diagnosis: Obesity Referral Source: Hamlet Stanford NP Start of Care [facility]): 2018 Gender: female Age: 39 y.o. Ht: 59 in Wt:  173 lb  kg BMI: 34.9 BMR Male  [facility] Female 1340 Anthropometrics Assessment: Per BMI, pt is considered obese. Past Medical History includes:   
 
Pertinent Medications:  
Phentermine Biochemical Data:  
Lab Results Component Value Date/Time Hemoglobin A1c 5.8 2018 08:27 AM  
 
Lab Results Component Value Date/Time Cholesterol, total 308 (H) 2018 08:27 AM  
 HDL Cholesterol 67 (H) 2018 08:27 AM  
 LDL, calculated 226 (H) 2018 08:27 AM  
 VLDL, calculated 15 2018 08:27 AM  
 Triglyceride 73 2018 08:27 AM  
 
Lab Results Component Value Date/Time ALT (SGPT) 16 2018 08:27 AM  
 AST (SGOT) 18 2018 08:27 AM  
 Alk. phosphatase 110 2018 08:27 AM  
 Bilirubin, total 0.3 2018 08:27 AM  
 
Lab Results Component Value Date/Time Creatinine 0.8 2018 08:27 AM  
 
Lab Results Component Value Date/Time BUN 12 2018 08:27 AM  
 
No results found for: Lauren Christy, MCA2, MCA3, MCAU, MCAU2, MCALPOCT Subjective/Assessment: 
 38 yo female being seen for weight loss nutrition therapy. Pt is frustrated by her fluctuating weight, week to week. She has been on Phentermine since April, stating it's been minimally helpful. Pt wanted to meet with a dietitian before considering weight loss surgery as an option. Pt is very mindful of her food intake and portion size.  Physical activity is limited to lower back pain, but she would like to begin water aerobics once joining a local gym. Pt was engaged during session and motivated to implement the recommendations provided. Will follow up in one month to review progress and provide additional support. Current Eating Patterns: B: 1/2 grapefruit, hard boiled egg, smoothie (fruit, Greek yogurt, water) S: carrots, fruit L: green salad, sometimes protein S: whole peanut butter sandwich, banana, hot tea D: Smart Ones (protein, veggie) Estimate Needs Calories: 1400  Protein: 126 Carbs: 119 Fat: 47 Kcal/day  g/day  g/day  g/day   
    percent: 36  34  30 Education & Recommendations provided: Provided weight loss nutrition therapy, including tools for a meal plan. Discussed meal timing and appropriate serving sizes. Handouts Provided: []  Carbohydrates 
[]  Protein []  Fiber 
[x]  Serving Sizes [x]  Meal and Snack Ideas 
[]  Food Journals []  Diabetes []  Cholesterol 
[]  Sodium 
[]  Gen Nutr Guidelines 
[]  SBGM Guidelines 
[x]  Others: Meal Plan Information Reviewed with: Pt Readiness to Change Stage: []  Pre-contemplative    []  Contemplative [x]  Preparation               [x]  Action                  []  Maintenance Potential Barriers to Learning: []  Decline in memory    []  Language barrier   []  Other: 
[]  Emotional                  []  Limited mobility 
[]  Lack of motivation     [] Vision, hearing or cognitive impairment Expected Compliance: Good Nutritional Goal - To promote lifestyle changes to result in:   
[x]  Weight loss 
[]  Improved diabetic control 
[]  Decreased cholesterol levels 
[]  Decreased blood pressure 
[]  Weight maintenance []  Preventing any interactions associated with food allergies []  Adequate weight gain toward goal weight 
[]  Other:  
  
 
Patient Goals: SMART goals 1. Begin following provided meal plan. 2. Check on gym membership for water aerobics class. Dietitian Signature: Zuleyma Barton RDN Date: 8/28/2018 Follow-up: Sept 24 at 1330 Time: 1:43 PM

## 2018-12-20 ENCOUNTER — OFFICE VISIT (OUTPATIENT)
Dept: FAMILY MEDICINE CLINIC | Age: 43
End: 2018-12-20

## 2018-12-20 VITALS
BODY MASS INDEX: 39.03 KG/M2 | RESPIRATION RATE: 16 BRPM | SYSTOLIC BLOOD PRESSURE: 132 MMHG | DIASTOLIC BLOOD PRESSURE: 83 MMHG | HEIGHT: 59 IN | TEMPERATURE: 98.4 F | WEIGHT: 193.6 LBS | HEART RATE: 77 BPM

## 2018-12-20 DIAGNOSIS — J32.2 CHRONIC ETHMOIDAL SINUSITIS: Primary | ICD-10-CM

## 2018-12-20 RX ORDER — AMOXICILLIN AND CLAVULANATE POTASSIUM 875; 125 MG/1; MG/1
1 TABLET, FILM COATED ORAL 2 TIMES DAILY
Qty: 20 TAB | Refills: 0 | Status: SHIPPED | OUTPATIENT
Start: 2018-12-20 | End: 2018-12-30

## 2018-12-20 NOTE — PROGRESS NOTES
OFFICE NOTE    Sajan Pedraza is a 39 y.o. female presenting today for office visit. 12/20/2018  2:53 PM    Chief Complaint   Patient presents with    Elevated Blood Pressure       HPI: Patient presenting today for concern of elevated blood pressures this morning. Patient states she has been having a headache for a week. Patient describes headache as above the eyes and states her eyes water with a clear discharge, she feels slightly dizzy when bending her head up and down. She checked her blood pressure this morning at work because of the headache and 1st blood pressure was 202/185 and 2nd time checking blood pressure was 166/101. Patient states she used the wrist type cuff in her office and was told it reading may not be accurate with that type of cuff. She admits to having chronic sinusitis and she is being followed by Dr. Pinky Reeder. She currently take flonase, singular, use an inhaler, take allegra D, and get allergy shots. Patient denies chest pain, SOB, fever, or tinnitus. Review of Systems   Constitutional: Positive for fatigue. Negative for appetite change, chills and fever. HENT: Positive for congestion, postnasal drip, sinus pressure and sinus pain. Negative for dental problem, ear discharge, ear pain, facial swelling, sore throat, tinnitus, trouble swallowing and voice change. Eyes: Positive for discharge (clear) and redness. Negative for visual disturbance. Respiratory: Negative for cough, chest tightness, shortness of breath and wheezing. Cardiovascular: Negative for chest pain. Gastrointestinal: Negative. Neurological: Positive for light-headedness and headaches. Negative for numbness.          PHQ Screening   PHQ over the last two weeks 4/5/2018   PHQ Not Done -   Little interest or pleasure in doing things Not at all   Feeling down, depressed, irritable, or hopeless Not at all   Total Score PHQ 2 0         History  Past Medical History:   Diagnosis Date    Insomnia Past Surgical History:   Procedure Laterality Date    PLASTIC SURGERY, NECK      [place] ANESTH,SURGERY OF SHOULDER         Social History     Socioeconomic History    Marital status: SINGLE     Spouse name: Not on file    Number of children: Not on file    Years of education: Not on file    Highest education level: Not on file   Social Needs    Financial resource strain: Not on file    Food insecurity - worry: Not on file    Food insecurity - inability: Not on file    Transportation needs - medical: Not on file   [facility] needs - non-medical: Not on file   Occupational History    Not on file   Tobacco Use    Smoking status: Never Smoker    Smokeless tobacco: Never Used   Substance and Sexual Activity    Alcohol use: No    Drug use: No    Sexual activity: Not on file   Other Topics Concern    Not on file   Social History Narrative    Not on file       Family History   Problem Relation Age of Onset    Hypertension Mother     Hypertension Father     Cancer Father         prostate       Allergies   Allergen Reactions    Dilaudid [Hydromorphone] Hives       Current Outpatient Medications   Medication Sig Dispense Refill    amoxicillin-clavulanate (AUGMENTIN) 875-125 mg per tablet Take 1 Tab by mouth two (2) times a day for 10 days. 20 Tab 0    beclomethasone (QVAR) 40 mcg/actuation aero Take 1 Puff by inhalation two (2) times a day.  traZODone (DESYREL) 50 mg tablet TAKE 1-3 TABLETS BY MOUTH AT BEDTIME. 5    estradiol (ESTRACE) 1 mg tablet Take 1 mg by mouth daily.  zolpidem (AMBIEN) 10 mg tablet TAKE 1 TABLET BY MOUTH EVERY OTHER DAY  2    hydrOXYzine HCl (ATARAX) 50 mg tablet TAKE 1/2,1,OR 2 TABLETS BY MOUTH EVERY NIGHT AT BEDTIME FOR INSOMNIA  5    phentermine (ADIPEX-P) 37.5 mg tablet Take 1 Tab by mouth daily.  Max Daily Amount: 37.5 mg. 30 Tab 0         Patient Care Team:  Patient Care Team:  Deshawn Khan NP as PCP - General (Nurse Practitioner)        LABS/Results:  Results for orders placed or performed during the hospital encounter of 04/05/18   EKG, 12 LEAD, INITIAL   Result Value Ref Range    Ventricular Rate 66 BPM    Atrial Rate 66 BPM    P-R Interval 150 ms    QRS Duration 84 ms    Q-T Interval 402 ms    QTC Calculation (Bezet) 421 ms    Calculated P Axis 46 degrees    Calculated R Axis 35 degrees    Calculated T Axis 8 degrees    Diagnosis       Normal sinus rhythm  Normal ECG  No previous ECGs available  Confirmed by William Andrade MD, Radha Braun ([id number]) on 4/6/2018 4:34:06 PM         This example would display results for the last three orders with an external procedure ID of [id number]. RADIOLOGY:  None new to review      Physical Exam   Constitutional: She is oriented to person, place, and time. She appears well-developed and well-nourished. HENT:   Right Ear: External ear normal.   Left Ear: External ear normal.   Postnasal drainage, frontal and ethmoid sinus pain upon palpation   Eyes: Pupils are equal, round, and reactive to light. Neck: Normal range of motion. Neck supple. Cardiovascular: Normal rate, regular rhythm and normal heart sounds. Pulmonary/Chest: Effort normal and breath sounds normal.   Neurological: She is oriented to person, place, and time. Skin: Skin is warm and dry. Psychiatric: She has a normal mood and affect. Vitals:    12/20/18 1446   BP: 132/83   Pulse: 77   Resp: 16   Temp: 98.4 °F (36.9 °C)   TempSrc: Oral   Weight: 193 lb 9.6 oz (87.8 kg)   Height: 4' 11\" (1.499 m)   PainSc:   9   PainLoc: Generalized         Assessment and Plan      ICD-10-CM ICD-9-CM    1. Chronic ethmoidal sinusitis J32.2 473.2 amoxicillin-clavulanate (AUGMENTIN) 875-125 mg per tablet       Advised patient to increase water intake, use humidifier at night, try warm wash cloth to sinus areas. Advised patient to take medication as ordered. Continue current allergy medications.   Advised patient to follow up with Dr. Kimmie Linares if symptoms persist.      Today's visit blood pressure readings are stable. Headache is likely due to sinus pressure/pain. Advised patient to monitor blood pressure readings and if elevated follow up with PCP with blood pressure readings. *Plan of care reviewed with patient. Patient in agreement with plan and expresses understanding. All questions answered and patient encouraged to call or RTO if further questions or concerns. *After summary care printed, reviewed and given to patient.       Follow-up Disposition: Not on File

## 2018-12-20 NOTE — PATIENT INSTRUCTIONS
Sinusitis: Care Instructions  Your Care Instructions    Sinusitis is an infection of the lining of the sinus cavities in your head. Sinusitis often follows a cold. It causes pain and pressure in your head and face. In most cases, sinusitis gets better on its own in 1 to 2 weeks. But some mild symptoms may last for several weeks. Sometimes antibiotics are needed. Follow-up care is a key part of your treatment and safety. Be sure to make and go to all appointments, and call your doctor if you are having problems. It's also a good idea to know your test results and keep a list of the medicines you take. How can you care for yourself at home? · Take an over-the-counter pain medicine, such as acetaminophen (Tylenol), ibuprofen (Advil, Motrin), or naproxen (Aleve). Read and follow all instructions on the label. · If the doctor prescribed antibiotics, take them as directed. Do not stop taking them just because you feel better. You need to take the full course of antibiotics. · Be careful when taking over-the-counter cold or flu medicines and Tylenol at the same time. Many of these medicines have acetaminophen, which is Tylenol. Read the labels to make sure that you are not taking more than the recommended dose. Too much acetaminophen (Tylenol) can be harmful. · Breathe warm, moist air from a steamy shower, a hot bath, or a sink filled with hot water. Avoid cold, dry air. Using a humidifier in your home may help. Follow the directions for cleaning the machine. · Use saline (saltwater) nasal washes to help keep your nasal passages open and wash out mucus and bacteria. You can buy saline nose drops at a grocery store or drugstore. Or you can make your own at home by adding 1 teaspoon of salt and 1 teaspoon of baking soda to 2 cups of distilled water. If you make your own, fill a bulb syringe with the solution, insert the tip into your nostril, and squeeze gently. Satish Salo your nose.   · Put a hot, wet towel or a warm gel pack on your face 3 or 4 times a day for 5 to 10 minutes each time. · Try a decongestant nasal spray like oxymetazoline (Afrin). Do not use it for more than 3 days in a row. Using it for more than 3 days can make your congestion worse. When should you call for help? Call your doctor now or seek immediate medical care if:    · You have new or worse swelling or redness in your face or around your eyes.     · You have a new or higher fever.    Watch closely for changes in your health, and be sure to contact your doctor if:    · You have new or worse facial pain.     · The mucus from your nose becomes thicker (like pus) or has new blood in it.     · You are not getting better as expected. Where can you learn more? Go to http://bashir-princess.info/. Enter L841 in the search box to learn more about \"Sinusitis: Care Instructions. \"  Current as of: March 28, 2018  Content Version: 11.8  © 3553-2847 [employer]. Care instructions adapted under license by [employer] (which disclaims liability or warranty for this information). If you have questions about a medical condition or this instruction, always ask your healthcare professional. [place] [address] any warranty or liability for your use of this information.

## 2018-12-20 NOTE — PROGRESS NOTES
Chief Complaint   Patient presents with    Elevated Blood Pressure         Health Maintenance Due   Topic Date Due    DTaP/Tdap/Td series (1 - Tdap) 06/15/1995    Influenza Age 5 to Adult  08/01/2018       Health Maintenance reviewed       1. Have you been to the ER, urgent care clinic since your last visit? Hospitalized since your last visit? Yes When: 11/18 Where: [facility] Reason for visit: pain    2. Have you seen or consulted any other health care providers outside of the [address] [place] [address] since your last visit? Include any pap smears or colon screening.  Yes When: 11/18 Where: [facility] Reason for visit: ov

## 2019-01-14 ENCOUNTER — OFFICE VISIT (OUTPATIENT)
Dept: FAMILY MEDICINE CLINIC | Age: 44
End: 2019-01-14

## 2019-01-14 VITALS
TEMPERATURE: 97.8 F | HEART RATE: 77 BPM | HEIGHT: 59 IN | DIASTOLIC BLOOD PRESSURE: 67 MMHG | SYSTOLIC BLOOD PRESSURE: 126 MMHG | BODY MASS INDEX: 39.1 KG/M2 | RESPIRATION RATE: 16 BRPM

## 2019-01-14 DIAGNOSIS — M25.562 ACUTE PAIN OF LEFT KNEE: ICD-10-CM

## 2019-01-14 DIAGNOSIS — E66.01 SEVERE OBESITY WITH BODY MASS INDEX (BMI) OF 35.0 TO 39.9 WITH SERIOUS COMORBIDITY (HCC): ICD-10-CM

## 2019-01-14 DIAGNOSIS — M54.6 ACUTE LEFT-SIDED THORACIC BACK PAIN: ICD-10-CM
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
## 2019-01-14 RX ORDER — CYCLOBENZAPRINE HCL 10 MG
10 TABLET ORAL
Qty: 30 TAB | Refills: 0 | Status: SHIPPED | OUTPATIENT
Start: 2019-01-14 | End: 2019-04-11

## 2019-01-14 RX ORDER — DICLOFENAC SODIUM 50 MG/1
50 TABLET, DELAYED RELEASE ORAL 2 TIMES DAILY
Qty: 45 TAB | Refills: 0 | Status: SHIPPED | OUTPATIENT
Start: 2019-01-14 | End: 2019-02-11 | Stop reason: ALTCHOICE

## 2019-01-14 NOTE — PROGRESS NOTES
Chief Complaint   Patient presents with   [facility] Follow Up     patient was seen at [facility] on 1/12/19 for a fall. States right leg 'gave way' while coming down stairs causing her to fall. Health Maintenance Due   Topic Date Due    DTaP/Tdap/Td series (1 - Tdap) 06/15/1995    Influenza Age 5 to Adult  08/01/2018       Health Maintenance reviewed       1. Have you been to the ER, urgent care clinic since your last visit? Hospitalized since your last visit? Yes When: 1/19 Where: [facility] Reason for visit: fall    2. Have you seen or consulted any other health care providers outside of the [address] [place] [address] since your last visit? Include any pap smears or colon screening.  No

## 2019-01-14 NOTE — PROGRESS NOTES
HPI  Jina Raymond is a 39 y.o. female  Chief Complaint   Patient presents with   [facility] Follow Up     patient was seen at [facility] on 1/12/19 for a fall. States right leg 'gave way' while coming down stairs causing her to fall. Reports fall two days ago. Reports falling down 13 stairs. Reports she was on the 5th step when she fell. Reports she went to the ER and they did not find anything. Reports pain in back and in left knee. Reports she fell back on the steps hitting her back pain 8/10 and is sharp. Denies radiation of back pain. Reports left knee is 10/10. Reports knee pain is sharp. Reports the fall has limited her movement. Reports is using a walker now as she was using the cane prior to the fall due to her sciatica. Reports left thigh does get numb and this was chronic at night prior to the fall but now the numbness is constant. Reports she did have an appointment with a neuro-suergon on the 16th that was scheduled prior to her fall. Reports she has used the Vicodin, tylenol, and ibuprofen. .   Able to urinate and have a bowel. Past Medical History  Past Medical History:   Diagnosis Date    Insomnia        Surgical History  Past Surgical History:   Procedure Laterality Date    PLASTIC SURGERY, NECK      [place] ANESTH,SURGERY OF SHOULDER          Medications  Current Outpatient Medications   Medication Sig Dispense Refill    cyclobenzaprine (FLEXERIL) 10 mg tablet Take 1 Tab by mouth two (2) times daily as needed for Muscle Spasm(s). 30 Tab 0    diclofenac EC (VOLTAREN) 50 mg EC tablet Take 1 Tab by mouth two (2) times a day. 45 Tab 0    beclomethasone (QVAR) 40 mcg/actuation aero Take 1 Puff by inhalation two (2) times a day.  traZODone (DESYREL) 50 mg tablet TAKE 1-3 TABLETS BY MOUTH AT BEDTIME. 5    estradiol (ESTRACE) 1 mg tablet Take 1 mg by mouth daily.       zolpidem (AMBIEN) 10 mg tablet TAKE 1 TABLET BY MOUTH EVERY OTHER DAY  2    hydrOXYzine HCl (ATARAX) 50 mg tablet TAKE 1/2,1,OR 2 TABLETS BY MOUTH EVERY NIGHT AT BEDTIME FOR INSOMNIA  5    phentermine (ADIPEX-P) 37.5 mg tablet Take 1 Tab by mouth daily. Max Daily Amount: 37.5 mg. 30 Tab 0       Allergies  Allergies   Allergen Reactions    Dilaudid [Hydromorphone] Hives       Family History  Family History   Problem Relation Age of Onset    Hypertension Mother     Hypertension Father     Cancer Father         prostate       Social History  Social History     Socioeconomic History    Marital status: SINGLE     Spouse name: Not on file    Number of children: Not on file    Years of education: Not on file    Highest education level: Not on file   Social Needs    Financial resource strain: Not on file    Food insecurity - worry: Not on file    Food insecurity - inability: Not on file   [employer] needs - medical: Not on file   [employer] needs - non-medical: Not on file   Occupational History    Not on file   Tobacco Use    Smoking status: Never Smoker    Smokeless tobacco: Never Used   Substance and Sexual Activity    Alcohol use: No    Drug use: No    Sexual activity: Not on file   Other Topics Concern    Not on file   Social History Narrative    Not on file       Problem List  Patient Active Problem List   Diagnosis Code    Severe obesity (BMI 35.0-39. 9) E66.01       Review of Systems  Review of Systems   Constitutional: Negative for chills and fever. Eyes: Negative for blurred vision. Respiratory: Negative for shortness of breath. Cardiovascular: Negative for chest pain and palpitations. Gastrointestinal: Negative for blood in stool, nausea and vomiting. Genitourinary: Negative for hematuria. Musculoskeletal: Positive for back pain, falls and joint pain. Neurological: Positive for tingling. Negative for dizziness, loss of consciousness and weakness.        Vital Signs  Vitals:    01/14/19 0905   BP: 126/67   Pulse: 77   Resp: 16   Temp: 97.8 °F (36.6 °C)   TempSrc: Oral Height: 4' 11\" (1.499 m)   PainSc:   9   PainLoc: Back       Physical Exam  Physical Exam   Constitutional: She is oriented to person, place, and time. HENT:   Mouth/Throat: Oropharynx is clear and moist.   Eyes: Pupils are equal, round, and reactive to light. Cardiovascular: Normal rate, regular rhythm and normal heart sounds. Pulmonary/Chest: Effort normal and breath sounds normal.   Musculoskeletal: She exhibits edema and tenderness. She exhibits no deformity. Stiffness and limited movement to back. No bruising or swelling to back.  to touch in left thoracic region. Left knee with some moderate swelling. Tenderness on flexion and extension with full ROM. No bruising to left knee noted. No calf tenderness. Neurological: She is alert and oriented to person, place, and time. Coordination (ambulating slow with a walker) abnormal.   Psychiatric: She has a normal mood and affect. Diagnostics  Orders Placed This Encounter    cyclobenzaprine (FLEXERIL) 10 mg tablet     Sig: Take 1 Tab by mouth two (2) times daily as needed for Muscle Spasm(s). Dispense:  30 Tab     Refill:  0    diclofenac EC (VOLTAREN) 50 mg EC tablet     Sig: Take 1 Tab by mouth two (2) times a day. Dispense:  45 Tab     Refill:  0       Results  Results for orders placed or performed during the hospital encounter of 04/05/18   EKG, 12 LEAD, INITIAL   Result Value Ref Range    Ventricular Rate 66 BPM    Atrial Rate 66 BPM    P-R Interval 150 ms    QRS Duration 84 ms    Q-T Interval 402 ms    QTC Calculation (Bezet) 421 ms    Calculated P Axis 46 degrees    Calculated R Axis 35 degrees    Calculated T Axis 8 degrees    Diagnosis       Normal sinus rhythm  Normal ECG  No previous ECGs available  Confirmed by Cheryl Degroot MD, Millicent Rebollar ([id number]) on 4/6/2018 4:34:06 PM         Assessment and Plan  Diagnoses and all orders for this visit:    1. Fall, initial encounter    2. Acute left-sided thoracic back pain    3.  Acute pain of left knee    4. Severe obesity with body mass index (BMI) of 35.0 to 39.9 with serious comorbidity ([address]    Other orders  -     cyclobenzaprine (FLEXERIL) 10 mg tablet; Take 1 Tab by mouth two (2) times daily as needed for Muscle Spasm(s). -     diclofenac EC (VOLTAREN) 50 mg EC tablet; Take 1 Tab by mouth two (2) times a day. Medication, side effects, possible allergic reactions and warnings reviewed with patient. Patient verbalized understanding. Instructed to avoid using Flexeril and operating machinery or driving. Can use cool and warm compresses on and off for 20 min intervals. After care summary printed and reviewed with patient. Plan reviewed with patient. Questions answered. Patient verbalized understanding of plan and is in agreement with plan. Patient to follow up in one month or earlier if symptoms worsen for routine care and labs. Letter for work given.      Matthias Maloney, TIANAP-C

## 2019-01-14 NOTE — LETTER
NOTIFICATION RETURN TO WORK / SCHOOL 
 
1/14/2019 9:45 AM 
 
Ms. Vinny Arredondo [address] [place], [place] [address] To Whom It May Concern: 
 
Vinny Arredondo is currently under the care of [facility]. She will return to work/school on: 1/16/19 If there are questions or concerns please have the patient contact our office.  
 
 
 
Sincerely, 
 
 
Dipti Ibarra, NP

## 2019-02-11 ENCOUNTER — OFFICE VISIT (OUTPATIENT)
Dept: FAMILY MEDICINE CLINIC | Age: 44
End: 2019-02-11

## 2019-02-11 VITALS
BODY MASS INDEX: 38.91 KG/M2 | TEMPERATURE: 98.2 F | RESPIRATION RATE: 16 BRPM | DIASTOLIC BLOOD PRESSURE: 82 MMHG | HEIGHT: 59 IN | OXYGEN SATURATION: 100 % | SYSTOLIC BLOOD PRESSURE: 137 MMHG | WEIGHT: 193 LBS

## 2019-02-11 DIAGNOSIS — M54.6 ACUTE LEFT-SIDED THORACIC BACK PAIN: ICD-10-CM

## 2019-02-11 DIAGNOSIS — M25.562 ACUTE PAIN OF LEFT KNEE: ICD-10-CM
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
## 2019-02-11 RX ORDER — ALBUTEROL SULFATE 90 UG/1
AEROSOL, METERED RESPIRATORY (INHALATION)
Refills: 5 | COMMUNITY
Start: 2018-12-29 | End: 2019-04-11

## 2019-02-11 RX ORDER — HYDROCODONE BITARTRATE AND ACETAMINOPHEN 5; 325 MG/1; MG/1
TABLET ORAL
Refills: 0 | COMMUNITY
Start: 2019-01-12 | End: 2019-02-11 | Stop reason: ALTCHOICE

## 2019-02-11 RX ORDER — HYDROXYZINE PAMOATE 50 MG/1
CAPSULE ORAL
Refills: 5 | COMMUNITY
Start: 2019-01-25

## 2019-02-11 RX ORDER — MONTELUKAST SODIUM 10 MG/1
TABLET ORAL
Refills: 3 | COMMUNITY
Start: 2019-02-01

## 2019-02-11 RX ORDER — ERGOCALCIFEROL 1.25 MG/1
CAPSULE ORAL
COMMUNITY
Start: 2016-04-14 | End: 2019-02-27 | Stop reason: SDUPTHER

## 2019-02-11 RX ORDER — OMEPRAZOLE 20 MG/1
CAPSULE, DELAYED RELEASE ORAL
Refills: 2 | COMMUNITY
Start: 2018-11-28 | End: 2019-06-13

## 2019-02-11 RX ORDER — HYDROCODONE BITARTRATE AND ACETAMINOPHEN 5; 325 MG/1; MG/1
1-2 TABLET ORAL
COMMUNITY
Start: 2016-05-04 | End: 2019-02-11 | Stop reason: ALTCHOICE

## 2019-02-11 RX ORDER — FLUTICASONE PROPIONATE 44 UG/1
AEROSOL, METERED RESPIRATORY (INHALATION)
Refills: 6 | COMMUNITY
Start: 2019-01-21

## 2019-02-11 RX ORDER — AMITRIPTYLINE HYDROCHLORIDE 25 MG/1
TABLET, FILM COATED ORAL
Refills: 0 | COMMUNITY
Start: 2019-01-24 | End: 2019-04-11

## 2019-02-11 RX ORDER — ALPRAZOLAM 0.5 MG/1
TABLET ORAL
COMMUNITY
Start: 2016-04-04 | End: 2019-04-11

## 2019-02-11 RX ORDER — ALBUTEROL SULFATE 0.83 MG/ML
SOLUTION RESPIRATORY (INHALATION)
Refills: 6 | COMMUNITY
Start: 2019-01-24

## 2019-02-11 RX ORDER — LIDOCAINE 50 MG/G
PATCH TOPICAL
COMMUNITY
Start: 2016-04-14 | End: 2019-07-12

## 2019-02-11 NOTE — PROGRESS NOTES
KOREY 
Mike Hartley is a 39 y.o. female Chief Complaint Patient presents with  Follow-up Follow up related to recent fall Reports she did have an epidural injection on 2/5/19 by pain management that has not helped and states she is scheduled for another one on 2/26/19. Reports she is still using her cane and staying downstairs in her home. Reports she is on xanax and amitriptyline from pain management and this is not helping. Reports she is also taking flexeril and lidocaine. Reports she takes hydroxyzine at night to help her sleep. Back pain 7/10 and knee pain that is 9/10. Reports back and knee pain is achy and denies that pain radiates today. Reports she is able to have a bowel and urinate with no problem. Reports she did see orthopedics who sent her to a chiropractor. She reports they did not recommend she go to physical therapy. If her next injection does not work she is then to return to orthopedics to discuss surgery as this would be her fourth injection. Denies swelling in her left knee, feet, and ankles. Denies calf pain. Denies any additional falls. Past Medical History Past Medical History:  
Diagnosis Date  Insomnia Surgical History Past Surgical History:  
Procedure Laterality Date  PLASTIC SURGERY, NECK  [place] ANESTH,SURGERY OF SHOULDER Medications Current Outpatient Medications Medication Sig Dispense Refill  montelukast (SINGULAIR) 10 mg tablet TAKE 1 TABLET BY MOUTH ONCE EVERY EVENING  3  
 lidocaine (LIDODERM) 5 %  hydrOXYzine pamoate (VISTARIL) 50 mg capsule TAKE 2 CAPSULES BY MOUTH EVERY DAY AT BEDTIME  5  
 FLOVENT HFA 44 mcg/actuation inhaler TAKE 2 PUFFS BY MOUTH TWICE A DAY  6  
 ergocalciferol (ERGOCALCIFEROL) 50,000 unit capsule  amitriptyline (ELAVIL) 25 mg tablet PLEASE SEE ATTACHED FOR DETAILED DIRECTIONS  0  
 ALPRAZolam (XANAX) 0.5 mg tablet Take  by mouth.  albuterol (PROVENTIL VENTOLIN) 2.5 mg /3 mL (0.083 %) nebulizer solution USE 1 VIAL VIA NEBULIZER BY MOUTH EVERY 4 TO 6 HOURS AS NEEDED  6  
 VENTOLIN HFA 90 mcg/actuation inhaler TAKE 2 PUFFS BY MOUTH EVERY 4-6 HOURS AS NEEDED FOR WHEEZING  5  
 cyclobenzaprine (FLEXERIL) 10 mg tablet Take 1 Tab by mouth two (2) times daily as needed for Muscle Spasm(s). 30 Tab 0  
 beclomethasone (QVAR) 40 mcg/actuation aero Take 1 Puff by inhalation two (2) times a day.  estradiol (ESTRACE) 1 mg tablet Take 1 mg by mouth daily.  zolpidem (AMBIEN) 10 mg tablet TAKE 1 TABLET BY MOUTH EVERY OTHER DAY  2  
 omeprazole (PRILOSEC) 20 mg capsule TAKE ONE CAPSULE BY MOUTH TWICE A DAY  2  
 traZODone (DESYREL) 50 mg tablet TAKE 1-3 TABLETS BY MOUTH AT BEDTIME.  5  
 
 
Allergies Allergies Allergen Reactions  Dilaudid [Hydromorphone] Hives Family History Family History Problem Relation Age of Onset  Hypertension Mother  Hypertension Father  Cancer Father   
     prostate Social History Social History Socioeconomic History  Marital status: SINGLE Spouse name: Not on file  Number of children: Not on file  Years of education: Not on file  Highest education level: Not on file Social Needs  Financial resource strain: Not on file  Food insecurity - worry: Not on file  Food insecurity - inability: Not on file  Transportation needs - medical: Not on file  Transportation needs - non-medical: Not on file Occupational History  Not on file Tobacco Use  Smoking status: Never Smoker  Smokeless tobacco: Never Used Substance and Sexual Activity  Alcohol use: No  
 Drug use: No  
 Sexual activity: Not on file Other Topics Concern  Not on file Social History Narrative  Not on file Problem List 
Patient Active Problem List  
Diagnosis Code  Severe obesity (BMI 35.0-39. 9) E66.01 Review of Systems Review of Systems Constitutional: Negative for chills and fever. Respiratory: Negative for shortness of breath. Cardiovascular: Negative for chest pain. Gastrointestinal: Negative for abdominal pain, nausea and vomiting. Genitourinary: Negative. Musculoskeletal: Positive for back pain, joint pain and myalgias. Negative for falls. Vital Signs Vitals:  
 02/11/19 1334 BP: 137/82 Resp: 16 Temp: 98.2 °F (36.8 °C) TempSrc: Oral  
SpO2: 100% Weight: 193 lb (87.5 kg) Height: 4' 11\" (1.499 m) PainSc:   9 PainLoc: Knee Physical Exam 
Physical Exam  
Constitutional: She is oriented to person, place, and time. HENT:  
Mouth/Throat: Oropharynx is clear and moist.  
Eyes: Pupils are equal, round, and reactive to light. Cardiovascular: Normal rate, regular rhythm, normal heart sounds and intact distal pulses. Pulmonary/Chest: Effort normal and breath sounds normal. No respiratory distress. Abdominal: Soft. Bowel sounds are normal. She exhibits no distension. There is no tenderness. Musculoskeletal: She exhibits tenderness. She exhibits no edema. Tenderness on extension of left knee. No tenderness noted on flexion of left knee. Point tenderness to left lumbar area. No bruising or swelling to lumbar area. Neurological: She is alert and oriented to person, place, and time. Skin: Skin is warm and dry. Psychiatric: She has a normal mood and affect. Her behavior is normal.  
Vitals reviewed. Diagnostics Orders Placed This Encounter  montelukast (SINGULAIR) 10 mg tablet Sig: TAKE 1 TABLET BY MOUTH ONCE EVERY EVENING Refill:  3  
 omeprazole (PRILOSEC) 20 mg capsule Sig: TAKE ONE CAPSULE BY MOUTH TWICE A DAY Refill:  2  
 lidocaine (LIDODERM) 5 %  hydrOXYzine pamoate (VISTARIL) 50 mg capsule Sig: TAKE 2 CAPSULES BY MOUTH EVERY DAY AT BEDTIME Refill:  5  
 DISCONTD: HYDROcodone-acetaminophen (NORCO) 5-325 mg per tablet Sig: take 1 tablet by mouth every 4 hours if needed for pain Refill:  0  
 DISCONTD: HYDROcodone-acetaminophen (NORCO) 5-325 mg per tablet Sig: Take 1-2 Tabs by mouth.  FLOVENT HFA 44 mcg/actuation inhaler Sig: TAKE 2 PUFFS BY MOUTH TWICE A DAY Refill:  6  
 ergocalciferol (ERGOCALCIFEROL) 50,000 unit capsule  amitriptyline (ELAVIL) 25 mg tablet Sig: PLEASE SEE ATTACHED FOR DETAILED DIRECTIONS Refill:  0  
 ALPRAZolam (XANAX) 0.5 mg tablet Sig: Take  by mouth.  albuterol (PROVENTIL VENTOLIN) 2.5 mg /3 mL (0.083 %) nebulizer solution Sig: USE 1 VIAL VIA NEBULIZER BY MOUTH EVERY 4 TO 6 HOURS AS NEEDED Refill:  6  VENTOLIN HFA 90 mcg/actuation inhaler Sig: TAKE 2 PUFFS BY MOUTH EVERY 4-6 HOURS AS NEEDED FOR WHEEZING Refill:  5 Results Results for orders placed or performed during the hospital encounter of 04/05/18 EKG, 12 LEAD, INITIAL Result Value Ref Range Ventricular Rate 66 BPM  
 Atrial Rate 66 BPM  
 P-R Interval 150 ms QRS Duration 84 ms Q-T Interval 402 ms QTC Calculation (Bezet) 421 ms Calculated P Axis 46 degrees Calculated R Axis 35 degrees Calculated T Axis 8 degrees Diagnosis Normal sinus rhythm Normal ECG No previous ECGs available Confirmed by Reece Caceres MD, Hermelindo Pollock ([id number]) on 4/6/2018 4:34:06 PM 
  
 
Assessment and Plan Diagnoses and all orders for this visit: 
 
1. Fall, subsequent encounter 2. Acute left-sided thoracic back pain 3. Acute pain of left knee 4. BMI 38.0-38.9,adult Follow up with pain management as they are managing her back pain. After care summary printed and reviewed with patient. Plan reviewed with patient. Questions answered. Patient verbalized understanding of plan and is in agreement with plan. Patient to follow up in one month or earlier if symptoms worsen. Will need a physical. Discussed labs needed.  Patient agrees to make an appointment and have a physical and labs completed at the same time. LARRY Smalls Discussed the patient's BMI with her. The BMI follow up plan is as follows:  
 
dietary management education, guidance, and counseling 
encourage exercise - limited due to back pain - agrees to do 1-2 lb hand weights when sitting. 
monitor weight 
prescribed dietary intake An After Visit Summary was printed and given to the patient.  
 
LARRY Smalls

## 2019-02-11 NOTE — PATIENT INSTRUCTIONS
Body Mass Index: Care Instructions Your Care Instructions Body mass index (BMI) can help you see if your weight is raising your risk for health problems. It uses a formula to compare how much you weigh with how tall you are. · A BMI lower than 18.5 is considered underweight. · A BMI between 18.5 and 24.9 is considered healthy. · A BMI between 25 and 29.9 is considered overweight. A BMI of 30 or higher is considered obese. If your BMI is in the normal range, it means that you have a lower risk for weight-related health problems. If your BMI is in the overweight or obese range, you may be at increased risk for weight-related health problems, such as high blood pressure, heart disease, stroke, arthritis or joint pain, and diabetes. If your BMI is in the underweight range, you may be at increased risk for health problems such as fatigue, lower protection (immunity) against illness, muscle loss, bone loss, hair loss, and hormone problems. BMI is just one measure of your risk for weight-related health problems. You may be at higher risk for health problems if you are not active, you eat an unhealthy diet, or you drink too much alcohol or use tobacco products. Follow-up care is a key part of your treatment and safety. Be sure to make and go to all appointments, and call your doctor if you are having problems. It's also a good idea to know your test results and keep a list of the medicines you take. How can you care for yourself at home? · Practice healthy eating habits. This includes eating plenty of fruits, vegetables, whole grains, lean protein, and low-fat dairy. · If your doctor recommends it, get more exercise. Walking is a good choice. Bit by bit, increase the amount you walk every day. Try for at least 30 minutes on most days of the week. · Do not smoke. Smoking can increase your risk for health problems.  If you need help quitting, talk to your doctor about stop-smoking programs and medicines. These can increase your chances of quitting for good. · Limit alcohol to 2 drinks a day for men and 1 drink a day for women. Too much alcohol can cause health problems. If you have a BMI higher than 25 · Your doctor may do other tests to check your risk for weight-related health problems. This may include measuring the distance around your waist. A waist measurement of more than 40 inches in men or 35 inches in women can increase the risk of weight-related health problems. · Talk with your doctor about steps you can take to stay healthy or improve your health. You may need to make lifestyle changes to lose weight and stay healthy, such as changing your diet and getting regular exercise. If you have a BMI lower than 18.5 · Your doctor may do other tests to check your risk for health problems. · Talk with your doctor about steps you can take to stay healthy or improve your health. You may need to make lifestyle changes to gain or maintain weight and stay healthy, such as getting more healthy foods in your diet and doing exercises to build muscle. Where can you learn more? Go to http://bashir-princess.info/. Enter S176 in the search box to learn more about \"Body Mass Index: Care Instructions. \" Current as of: October 13, 2016 Content Version: 11.4 © 5452-7068 [employer]. Care instructions adapted under license by [employer] (which disclaims liability or warranty for this information). If you have questions about a medical condition or this instruction, always ask your healthcare professional. [address] any warranty or liability for your use of this information.

## 2019-02-11 NOTE — PROGRESS NOTES
Leatha Gaviria presents today for Chief Complaint Patient presents with  Follow-up Follow up related to recent fall Leatha Gaviria preferred language for health care discussion is english/other. Is someone accompanying this pt? No 
 
Is the patient using any DME equipment during OV? Yes, uses cane Depression Screening: PHQ over the last two weeks 1/14/2019 PHQ Not Done - Little interest or pleasure in doing things Not at all Feeling down, depressed, irritable, or hopeless Not at all Total Score PHQ 2 0 Learning Assessment: 
Learning Assessment 1/14/2019 PRIMARY LEARNER Patient HIGHEST LEVEL OF EDUCATION - PRIMARY LEARNER  -  
BARRIERS PRIMARY LEARNER -  
CO-LEARNER CAREGIVER -  
PRIMARY LANGUAGE ENGLISH  NEED -  
LEARNER PREFERENCE PRIMARY DEMONSTRATION  
ANSWERED BY patient RELATIONSHIP SELF Abuse Screening: 
Abuse Screening Questionnaire 1/14/2019 Do you ever feel afraid of your partner? Billy Mohr Are you in a relationship with someone who physically or mentally threatens you? Billy Mohr Is it safe for you to go home? Tammi Molina Coordination of Care: 1. Have you been to the ER, urgent care clinic since your last visit? Hospitalized since your last visit? No 
 
2. Have you seen or consulted any other health care providers outside of the [address] [place] [address] since your last visit? Include any pap smears or colon screening. No 
 
 
 
Advance Directive: 1. Do you have an advance directive in place? Patient Reply:no 2. If not, would you like material regarding how to put one in place?  Patient Reply: no

## 2019-02-20 ENCOUNTER — OFFICE VISIT (OUTPATIENT)
Dept: FAMILY MEDICINE CLINIC | Age: 44
End: 2019-02-20

## 2019-02-20 VITALS
SYSTOLIC BLOOD PRESSURE: 134 MMHG | TEMPERATURE: 98.3 F | WEIGHT: 197 LBS | DIASTOLIC BLOOD PRESSURE: 82 MMHG | RESPIRATION RATE: 16 BRPM | HEIGHT: 59 IN | BODY MASS INDEX: 39.72 KG/M2 | HEART RATE: 74 BPM

## 2019-02-20 DIAGNOSIS — E55.9 VITAMIN D DEFICIENCY: ICD-10-CM

## 2019-02-20 DIAGNOSIS — I10 ESSENTIAL HYPERTENSION: ICD-10-CM

## 2019-02-20 DIAGNOSIS — M54.50 ACUTE LEFT-SIDED LOW BACK PAIN WITHOUT SCIATICA: Primary | ICD-10-CM

## 2019-02-20 DIAGNOSIS — M54.50 ACUTE LEFT-SIDED LOW BACK PAIN WITHOUT SCIATICA: ICD-10-CM

## 2019-02-20 DIAGNOSIS — E78.5 HYPERLIPIDEMIA, UNSPECIFIED HYPERLIPIDEMIA TYPE: ICD-10-CM

## 2019-02-20 DIAGNOSIS — Z12.39 BREAST CANCER SCREENING: ICD-10-CM

## 2019-02-20 NOTE — PROGRESS NOTES
Chief Complaint   Patient presents with    Other     request order for lab and mammogram         1. Have you been to the ER, urgent care clinic since your last visit? Hospitalized since your last visit? No    2. Have you seen or consulted any other health care providers outside of the [address] [place] [address] since your last visit? Include any pap smears or colon screening.  No

## 2019-02-20 NOTE — PROGRESS NOTES
KOREY Perea is a 39 y.o. female  Chief Complaint   Patient presents with    Other     request order for lab and mammogram     Denies any new issues. Reports she does not need a well women exam. Reports she is just intereseted in orders for labs and a mammogram. Denies any new concerns. Denies chest pain or shortness of breath. Chronic back pain. Reports she has had a total hysterectomy. Past Medical History  Past Medical History:   Diagnosis Date    Insomnia        Surgical History  Past Surgical History:   Procedure Laterality Date    PLASTIC SURGERY, NECK      [place] ANESTH,SURGERY OF SHOULDER          Medications  Current Outpatient Medications   Medication Sig Dispense Refill    montelukast (SINGULAIR) 10 mg tablet TAKE 1 TABLET BY MOUTH ONCE EVERY EVENING  3    omeprazole (PRILOSEC) 20 mg capsule TAKE ONE CAPSULE BY MOUTH TWICE A DAY  2    lidocaine (LIDODERM) 5 %       hydrOXYzine pamoate (VISTARIL) 50 mg capsule TAKE 2 CAPSULES BY MOUTH EVERY DAY AT BEDTIME  5    FLOVENT HFA 44 mcg/actuation inhaler TAKE 2 PUFFS BY MOUTH TWICE A DAY  6    ergocalciferol (ERGOCALCIFEROL) 50,000 unit capsule       amitriptyline (ELAVIL) 25 mg tablet PLEASE SEE ATTACHED FOR DETAILED DIRECTIONS  0    ALPRAZolam (XANAX) 0.5 mg tablet Take  by mouth.  albuterol (PROVENTIL VENTOLIN) 2.5 mg /3 mL (0.083 %) nebulizer solution USE 1 VIAL VIA NEBULIZER BY MOUTH EVERY 4 TO 6 HOURS AS NEEDED  6    VENTOLIN HFA 90 mcg/actuation inhaler TAKE 2 PUFFS BY MOUTH EVERY 4-6 HOURS AS NEEDED FOR WHEEZING  5    cyclobenzaprine (FLEXERIL) 10 mg tablet Take 1 Tab by mouth two (2) times daily as needed for Muscle Spasm(s). 30 Tab 0    beclomethasone (QVAR) 40 mcg/actuation aero Take 1 Puff by inhalation two (2) times a day.  traZODone (DESYREL) 50 mg tablet TAKE 1-3 TABLETS BY MOUTH AT BEDTIME. 5    estradiol (ESTRACE) 1 mg tablet Take 1 mg by mouth daily.       zolpidem (AMBIEN) 10 mg tablet TAKE 1 TABLET BY MOUTH EVERY OTHER DAY  2       Allergies  Allergies   Allergen Reactions    Dilaudid [Hydromorphone] Hives       Family History  Family History   Problem Relation Age of Onset    Hypertension Mother     Hypertension Father     Cancer Father         prostate       Social History  Social History     Socioeconomic History    Marital status: SINGLE     Spouse name: Not on file    Number of children: Not on file    Years of education: Not on file    Highest education level: Not on file   Social Needs    Financial resource strain: Not on file    Food insecurity - worry: Not on file    Food insecurity - inability: Not on file   Nivela needs - medical: Not on file   Nivela needs - non-medical: Not on file   Occupational History    Not on file   Tobacco Use    Smoking status: Never Smoker    Smokeless tobacco: Never Used   Substance and Sexual Activity    Alcohol use: No    Drug use: No    Sexual activity: Not on file   Other Topics Concern    Not on file   Social History Narrative    Not on file       Problem List  Patient Active Problem List   Diagnosis Code    Severe obesity (BMI 35.0-39. 9) E66.01       Review of Systems  Review of Systems   Constitutional: Negative for chills and fever. Respiratory: Negative for shortness of breath. Cardiovascular: Negative for chest pain, palpitations and leg swelling. Gastrointestinal: Negative for abdominal pain, nausea and vomiting. Musculoskeletal: Positive for back pain. Negative for falls. Neurological: Negative for dizziness and headaches. Vital Signs  Vitals:    02/20/19 0751 02/20/19 0802   BP: 142/86 134/82   Pulse: 74    Resp: 16    Temp: 98.3 °F (36.8 °C)    TempSrc: Oral    Weight: 197 lb (89.4 kg)    Height: 4' 11\" (1.499 m)    PainSc:   8    PainLoc: Back        Physical Exam  Physical Exam   Constitutional: She is oriented to person, place, and time.    HENT:   Mouth/Throat: Oropharynx is clear and moist.   Eyes: Pupils are equal, round, and reactive to light. Cardiovascular: Normal rate, regular rhythm and normal heart sounds. Pulmonary/Chest: Effort normal and breath sounds normal. No respiratory distress. Abdominal: Soft. Bowel sounds are normal.   Musculoskeletal: She exhibits no edema. Neurological: She is alert and oriented to person, place, and time. Skin: Skin is warm and dry. Psychiatric: She has a normal mood and affect. Her behavior is normal.       Diagnostics  Orders Placed This Encounter    [facility] MAMMO BI SCREENING INCL CAD     Standing Status:   Future     Standing Expiration Date:   3/22/2020     Order Specific Question:   Reason for Exam     Answer:   annual     Order Specific Question:   Is Patient Pregnant?      Answer:   Unknown    METABOLIC PANEL, COMPREHENSIVE     Standing Status:   Future     Standing Expiration Date:   2/21/2020    LIPID PANEL     Standing Status:   Future     Standing Expiration Date:   2/21/2020    CBC WITH AUTOMATED DIFF     Standing Status:   Future     Standing Expiration Date:   2/21/2020    TSH 3RD GENERATION     Standing Status:   Future     Standing Expiration Date:   2/21/2020    T4, FREE     Standing Status:   Future     Standing Expiration Date:   8/20/2019    HEMOGLOBIN A1C WITH EAG     Standing Status:   Future     Standing Expiration Date:   2/21/2020    VITAMIN D, 25 HYDROXY     Standing Status:   Future     Standing Expiration Date:   2/20/2020       Results  Results for orders placed or performed during the hospital encounter of 04/05/18   EKG, 12 LEAD, INITIAL   Result Value Ref Range    Ventricular Rate 66 BPM    Atrial Rate 66 BPM    P-R Interval 150 ms    QRS Duration 84 ms    Q-T Interval 402 ms    QTC Calculation (Bezet) 421 ms    Calculated P Axis 46 degrees    Calculated R Axis 35 degrees    Calculated T Axis 8 degrees    Diagnosis       Normal sinus rhythm  Normal ECG  No previous ECGs available  Confirmed by Britton Finch MD, Jerry Mendoza ([id number]) on 4/6/2018 4:34:06 PM         Assessment and Plan  Diagnoses and all orders for this visit:    1. Acute left-sided low back pain without sciatica  -     METABOLIC PANEL, COMPREHENSIVE; Future  -     LIPID PANEL; Future  -     CBC WITH AUTOMATED DIFF; Future  -     TSH 3RD GENERATION; Future  -     T4, FREE; Future  -     HEMOGLOBIN A1C WITH EAG; Future  -     VITAMIN D, 25 HYDROXY; Future    2. Essential hypertension  -     METABOLIC PANEL, COMPREHENSIVE; Future  -     LIPID PANEL; Future  -     CBC WITH AUTOMATED DIFF; Future  -     TSH 3RD GENERATION; Future  -     T4, FREE; Future  -     HEMOGLOBIN A1C WITH EAG; Future  -     VITAMIN D, 25 HYDROXY; Future    3. Hyperlipidemia, unspecified hyperlipidemia type  -     METABOLIC PANEL, COMPREHENSIVE; Future  -     LIPID PANEL; Future  -     CBC WITH AUTOMATED DIFF; Future  -     TSH 3RD GENERATION; Future  -     T4, FREE; Future  -     HEMOGLOBIN A1C WITH EAG; Future  -     VITAMIN D, 25 HYDROXY; Future    4. Vitamin D deficiency  -     METABOLIC PANEL, COMPREHENSIVE; Future  -     LIPID PANEL; Future  -     CBC WITH AUTOMATED DIFF; Future  -     TSH 3RD GENERATION; Future  -     T4, FREE; Future  -     HEMOGLOBIN A1C WITH EAG; Future  -     VITAMIN D, 25 HYDROXY; Future    5. Breast cancer screening  -     [facility] MAMMO BI SCREENING INCL CAD; Future        After care summary printed and reviewed with patient. Plan reviewed with patient. Questions answered. Patient verbalized understanding of plan and is in agreement with plan. Patient to follow up in one week or earlier if symptoms worsen. Will follow up on labs.   LARRY Manzano

## 2019-02-21 LAB
25(OH)D3+25(OH)D2 SERPL-MCNC: 24.7 NG/ML (ref 30–100)
ALBUMIN SERPL-MCNC: 3.8 G/DL (ref 3.5–5.5)
ALBUMIN/GLOB SERPL: 1 {RATIO} (ref 1.2–2.2)
ALP SERPL-CCNC: 112 IU/L (ref 39–117)
ALT SERPL-CCNC: 26 IU/L (ref 0–32)
AST SERPL-CCNC: 14 IU/L (ref 0–40)
BASOPHILS # BLD AUTO: 0 X10E3/UL (ref 0–0.2)
BASOPHILS NFR BLD AUTO: 0 %
BILIRUB SERPL-MCNC: <0.2 MG/DL (ref 0–1.2)
BUN SERPL-MCNC: 13 MG/DL (ref 6–24)
BUN/CREAT SERPL: 17 (ref 9–23)
CALCIUM SERPL-MCNC: 8.8 MG/DL (ref 8.7–10.2)
CHLORIDE SERPL-SCNC: 102 MMOL/L (ref 96–106)
CHOLEST SERPL-MCNC: 299 MG/DL (ref 100–199)
CO2 SERPL-SCNC: 23 MMOL/L (ref 20–29)
CREAT SERPL-MCNC: 0.78 MG/DL (ref 0.57–1)
EOSINOPHIL # BLD AUTO: 0.1 X10E3/UL (ref 0–0.4)
EOSINOPHIL NFR BLD AUTO: 1 %
ERYTHROCYTE [DISTWIDTH] IN BLOOD BY AUTOMATED COUNT: 15.7 % (ref 12.3–15.4)
EST. AVERAGE GLUCOSE BLD GHB EST-MCNC: 128 MG/DL
GLOBULIN SER CALC-MCNC: 3.7 G/DL (ref 1.5–4.5)
GLUCOSE SERPL-MCNC: 81 MG/DL (ref 65–99)
HBA1C MFR BLD: 6.1 % (ref 4.8–5.6)
HCT VFR BLD AUTO: 33.9 % (ref 34–46.6)
HDLC SERPL-MCNC: 66 MG/DL
HGB BLD-MCNC: 10.7 G/DL (ref 11.1–15.9)
IMM GRANULOCYTES # BLD AUTO: 0 X10E3/UL (ref 0–0.1)
IMM GRANULOCYTES NFR BLD AUTO: 0 %
INTERPRETATION, 910389: NORMAL
LDLC SERPL CALC-MCNC: 213 MG/DL (ref 0–99)
LYMPHOCYTES # BLD AUTO: 2 X10E3/UL (ref 0.7–3.1)
LYMPHOCYTES NFR BLD AUTO: 22 %
MCH RBC QN AUTO: 25.2 PG (ref 26.6–33)
MCHC RBC AUTO-ENTMCNC: 31.6 G/DL (ref 31.5–35.7)
MCV RBC AUTO: 80 FL (ref 79–97)
MONOCYTES # BLD AUTO: 0.5 X10E3/UL (ref 0.1–0.9)
MONOCYTES NFR BLD AUTO: 6 %
NEUTROPHILS # BLD AUTO: 6.4 X10E3/UL (ref 1.4–7)
NEUTROPHILS NFR BLD AUTO: 71 %
PLATELET # BLD AUTO: 394 X10E3/UL (ref 150–379)
POTASSIUM SERPL-SCNC: 4.5 MMOL/L (ref 3.5–5.2)
PROT SERPL-MCNC: 7.5 G/DL (ref 6–8.5)
RBC # BLD AUTO: 4.24 X10E6/UL (ref 3.77–5.28)
SODIUM SERPL-SCNC: 139 MMOL/L (ref 134–144)
T4 FREE SERPL-MCNC: 1.05 NG/DL (ref 0.82–1.77)
TRIGL SERPL-MCNC: 99 MG/DL (ref 0–149)
TSH SERPL DL<=0.005 MIU/L-ACNC: 3.47 UIU/ML (ref 0.45–4.5)
VLDLC SERPL CALC-MCNC: 20 MG/DL (ref 5–40)
WBC # BLD AUTO: 9 X10E3/UL (ref 3.4–10.8)

## 2019-02-27 ENCOUNTER — OFFICE VISIT (OUTPATIENT)
Dept: FAMILY MEDICINE CLINIC | Age: 44
End: 2019-02-27

## 2019-02-27 VITALS
BODY MASS INDEX: 39.96 KG/M2 | DIASTOLIC BLOOD PRESSURE: 91 MMHG | SYSTOLIC BLOOD PRESSURE: 143 MMHG | HEART RATE: 96 BPM | RESPIRATION RATE: 20 BRPM | WEIGHT: 198.2 LBS | TEMPERATURE: 97.5 F | HEIGHT: 59 IN

## 2019-02-27 DIAGNOSIS — E78.5 HYPERLIPIDEMIA, UNSPECIFIED HYPERLIPIDEMIA TYPE: ICD-10-CM

## 2019-02-27 DIAGNOSIS — Z71.2 ENCOUNTER TO DISCUSS TEST RESULTS: ICD-10-CM

## 2019-02-27 DIAGNOSIS — E55.9 VITAMIN D DEFICIENCY: ICD-10-CM

## 2019-02-27 DIAGNOSIS — D64.9 ANEMIA, UNSPECIFIED TYPE: ICD-10-CM

## 2019-02-27 DIAGNOSIS — R73.03 PREDIABETES: ICD-10-CM

## 2019-02-27 DIAGNOSIS — M25.562 LEFT KNEE PAIN, UNSPECIFIED CHRONICITY: ICD-10-CM

## 2019-02-27 DIAGNOSIS — I10 ESSENTIAL HYPERTENSION: Primary | ICD-10-CM

## 2019-02-27 RX ORDER — POTASSIUM CHLORIDE 750 MG/1
10 TABLET, EXTENDED RELEASE ORAL DAILY
Qty: 30 TAB | Refills: 0 | Status: SHIPPED | OUTPATIENT
Start: 2019-02-27 | End: 2019-06-13

## 2019-02-27 RX ORDER — HYDROCHLOROTHIAZIDE 25 MG/1
25 TABLET ORAL DAILY
Qty: 30 TAB | Refills: 0 | Status: SHIPPED | OUTPATIENT
Start: 2019-02-27 | End: 2019-03-22 | Stop reason: DRUGHIGH

## 2019-02-27 RX ORDER — ERGOCALCIFEROL 1.25 MG/1
50000 CAPSULE ORAL
Qty: 8 CAP | Refills: 0 | Status: SHIPPED | OUTPATIENT
Start: 2019-02-27 | End: 2019-05-07 | Stop reason: ALTCHOICE

## 2019-02-27 RX ORDER — PRAVASTATIN SODIUM 20 MG/1
20 TABLET ORAL
Qty: 30 TAB | Refills: 0 | Status: SHIPPED | OUTPATIENT
Start: 2019-02-27 | End: 2019-06-13

## 2019-02-27 RX ORDER — DICLOFENAC SODIUM 50 MG/1
50 TABLET, DELAYED RELEASE ORAL 2 TIMES DAILY
Qty: 30 TAB | Refills: 0 | Status: SHIPPED | OUTPATIENT
Start: 2019-02-27 | End: 2019-04-11

## 2019-02-27 NOTE — PROGRESS NOTES
Chief Complaint   Patient presents with    Follow-up     1 week    Hypertension    Results     discuss lab results          1. Have you been to the ER, urgent care clinic since your last visit? Hospitalized since your last visit? No    2. Have you seen or consulted any other health care providers outside of the [facility] since your last visit? Include any pap smears or colon screening.  No

## 2019-02-27 NOTE — PROGRESS NOTES
HPI  Nelida Rangel is a 39 y.o. female  Chief Complaint   Patient presents with    Follow-up     1 week    Hypertension    Results     discuss lab results     Reports falling over a month ago on with ongoing left knee pain. Reports she has tried aleve, tylenol, and ibuprofen. Reports she has had to go back to using her cane. Reports left knee pain is constant when she is standing and has to straighten her knee out. Reports pain is achy and non-radiating. Reports she is still using the lidoderm patch to her left knee. Pain level today is 8/10. Reports her blood pressure has been up. She has checked this the last two days and it has been elevated both times. Denies chest pain or shortness of breath. She would like her lab results. Past Medical History  Past Medical History:   Diagnosis Date    Insomnia        Surgical History  Past Surgical History:   Procedure Laterality Date    PLASTIC SURGERY, NECK      MD ANESTH,SURGERY OF SHOULDER          Medications  Current Outpatient Medications   Medication Sig Dispense Refill    hydroCHLOROthiazide (HYDRODIURIL) 25 mg tablet Take 1 Tab by mouth daily. 30 Tab 0    ergocalciferol (ERGOCALCIFEROL) 50,000 unit capsule Take 1 Cap by mouth every seven (7) days. 8 Cap 0    diclofenac EC (VOLTAREN) 50 mg EC tablet Take 1 Tab by mouth two (2) times a day. As needed 30 Tab 0    pravastatin (PRAVACHOL) 20 mg tablet Take 1 Tab by mouth nightly. 30 Tab 0    potassium chloride (KLOR-CON) 10 mEq tablet Take 1 Tab by mouth daily.  30 Tab 0    montelukast (SINGULAIR) 10 mg tablet TAKE 1 TABLET BY MOUTH ONCE EVERY EVENING  3    omeprazole (PRILOSEC) 20 mg capsule TAKE ONE CAPSULE BY MOUTH TWICE A DAY  2    lidocaine (LIDODERM) 5 %       hydrOXYzine pamoate (VISTARIL) 50 mg capsule TAKE 2 CAPSULES BY MOUTH EVERY DAY AT BEDTIME  5    FLOVENT HFA 44 mcg/actuation inhaler TAKE 2 PUFFS BY MOUTH TWICE A DAY  6    amitriptyline (ELAVIL) 25 mg tablet PLEASE SEE ATTACHED FOR DETAILED DIRECTIONS  0    ALPRAZolam (XANAX) 0.5 mg tablet Take  by mouth.  albuterol (PROVENTIL VENTOLIN) 2.5 mg /3 mL (0.083 %) nebulizer solution USE 1 VIAL VIA NEBULIZER BY MOUTH EVERY 4 TO 6 HOURS AS NEEDED  6    VENTOLIN HFA 90 mcg/actuation inhaler TAKE 2 PUFFS BY MOUTH EVERY 4-6 HOURS AS NEEDED FOR WHEEZING  5    cyclobenzaprine (FLEXERIL) 10 mg tablet Take 1 Tab by mouth two (2) times daily as needed for Muscle Spasm(s). 30 Tab 0    beclomethasone (QVAR) 40 mcg/actuation aero Take 1 Puff by inhalation two (2) times a day.  traZODone (DESYREL) 50 mg tablet TAKE 1-3 TABLETS BY MOUTH AT BEDTIME. 5    estradiol (ESTRACE) 1 mg tablet Take 1 mg by mouth daily.  zolpidem (AMBIEN) 10 mg tablet TAKE 1 TABLET BY MOUTH EVERY OTHER DAY  2       Allergies  Allergies   Allergen Reactions    Dilaudid [Hydromorphone] Hives       Family History  Family History   Problem Relation Age of Onset    Hypertension Mother     Hypertension Father     Cancer Father         prostate       Social History  Social History     Socioeconomic History    Marital status: SINGLE     Spouse name: Not on file    Number of children: Not on file    Years of education: Not on file    Highest education level: Not on file   Social Needs    Financial resource strain: Not on file    Food insecurity - worry: Not on file    Food insecurity - inability: Not on file   kenxus needs - medical: Not on file   kenxus needs - non-medical: Not on file   Occupational History    Not on file   Tobacco Use    Smoking status: Never Smoker    Smokeless tobacco: Never Used   Substance and Sexual Activity    Alcohol use: No    Drug use: No    Sexual activity: Not on file   Other Topics Concern    Not on file   Social History Narrative    Not on file       Problem List  Patient Active Problem List   Diagnosis Code    Severe obesity (BMI 35.0-39. 9) E66.01       Review of Systems  Review of Systems Respiratory: Negative for shortness of breath. Cardiovascular: Negative for chest pain. Genitourinary: Negative. Musculoskeletal: Positive for back pain (chronic), joint pain and myalgias. Neurological: Negative for dizziness. Vital Signs  Vitals:    02/27/19 1302   BP: (!) 143/91   Pulse: 96   Resp: 20   Temp: 97.5 °F (36.4 °C)   TempSrc: Oral   Weight: 198 lb 3.2 oz (89.9 kg)   Height: 4' 11\" (1.499 m)   PainSc:   8   PainLoc: Back       Physical Exam  Physical Exam   Constitutional: She is oriented to person, place, and time. HENT:   Mouth/Throat: Oropharynx is clear and moist.   Eyes: Pupils are equal, round, and reactive to light. Cardiovascular: Normal rate, regular rhythm and normal heart sounds. Pulmonary/Chest: Effort normal and breath sounds normal.   Abdominal: Soft. Bowel sounds are normal.   Neurological: She is alert and oriented to person, place, and time. Psychiatric: She has a normal mood and affect. Her behavior is normal.   Vitals reviewed. Diagnostics  Orders Placed This Encounter    VITAMIN B12     Standing Status:   Future     Standing Expiration Date:   2/28/2020    FOLATE     Standing Status:   Future     Standing Expiration Date:   2/28/2020    FERRITIN     Standing Status:   Future     Standing Expiration Date:   2/28/2020    hydroCHLOROthiazide (HYDRODIURIL) 25 mg tablet     Sig: Take 1 Tab by mouth daily. Dispense:  30 Tab     Refill:  0    ergocalciferol (ERGOCALCIFEROL) 50,000 unit capsule     Sig: Take 1 Cap by mouth every seven (7) days. Dispense:  8 Cap     Refill:  0    diclofenac EC (VOLTAREN) 50 mg EC tablet     Sig: Take 1 Tab by mouth two (2) times a day. As needed     Dispense:  30 Tab     Refill:  0    pravastatin (PRAVACHOL) 20 mg tablet     Sig: Take 1 Tab by mouth nightly. Dispense:  30 Tab     Refill:  0    potassium chloride (KLOR-CON) 10 mEq tablet     Sig: Take 1 Tab by mouth daily.      Dispense:  30 Tab     Refill:  0 Results  Results for orders placed or performed in visit on 62/03/15   METABOLIC PANEL, COMPREHENSIVE   Result Value Ref Range    Glucose 81 65 - 99 mg/dL    BUN 13 6 - 24 mg/dL    Creatinine 0.78 0.57 - 1.00 mg/dL    GFR est non-AA 93 >59 mL/min/1.73    GFR est  >59 mL/min/1.73    BUN/Creatinine ratio 17 9 - 23    Sodium 139 134 - 144 mmol/L    Potassium 4.5 3.5 - 5.2 mmol/L    Chloride 102 96 - 106 mmol/L    CO2 23 20 - 29 mmol/L    Calcium 8.8 8.7 - 10.2 mg/dL    Protein, total 7.5 6.0 - 8.5 g/dL    Albumin 3.8 3.5 - 5.5 g/dL    GLOBULIN, TOTAL 3.7 1.5 - 4.5 g/dL    A-G Ratio 1.0 (L) 1.2 - 2.2    Bilirubin, total <0.2 0.0 - 1.2 mg/dL    Alk. phosphatase 112 39 - 117 IU/L    AST (SGOT) 14 0 - 40 IU/L    ALT (SGPT) 26 0 - 32 IU/L   LIPID PANEL   Result Value Ref Range    Cholesterol, total 299 (H) 100 - 199 mg/dL    Triglyceride 99 0 - 149 mg/dL    HDL Cholesterol 66 >39 mg/dL    VLDL, calculated 20 5 - 40 mg/dL    LDL, calculated 213 (H) 0 - 99 mg/dL   CBC WITH AUTOMATED DIFF   Result Value Ref Range    WBC 9.0 3.4 - 10.8 x10E3/uL    RBC 4.24 3.77 - 5.28 x10E6/uL    HGB 10.7 (L) 11.1 - 15.9 g/dL    HCT 33.9 (L) 34.0 - 46.6 %    MCV 80 79 - 97 fL    MCH 25.2 (L) 26.6 - 33.0 pg    MCHC 31.6 31.5 - 35.7 g/dL    RDW 15.7 (H) 12.3 - 15.4 %    PLATELET 552 (H) 428 - 379 x10E3/uL    NEUTROPHILS 71 Not Estab. %    Lymphocytes 22 Not Estab. %    MONOCYTES 6 Not Estab. %    EOSINOPHILS 1 Not Estab. %    BASOPHILS 0 Not Estab. %    ABS. NEUTROPHILS 6.4 1.4 - 7.0 x10E3/uL    Abs Lymphocytes 2.0 0.7 - 3.1 x10E3/uL    ABS. MONOCYTES 0.5 0.1 - 0.9 x10E3/uL    ABS. EOSINOPHILS 0.1 0.0 - 0.4 x10E3/uL    ABS. BASOPHILS 0.0 0.0 - 0.2 x10E3/uL    IMMATURE GRANULOCYTES 0 Not Estab. %    ABS. IMM.  GRANS. 0.0 0.0 - 0.1 x10E3/uL   TSH 3RD GENERATION   Result Value Ref Range    TSH 3.470 0.450 - 4.500 uIU/mL   T4, FREE   Result Value Ref Range    T4, Free 1.05 0.82 - 1.77 ng/dL   HEMOGLOBIN A1C WITH EAG   Result Value Ref Range    Hemoglobin A1c 6.1 (H) 4.8 - 5.6 %    Estimated average glucose 128 mg/dL   VITAMIN D, 25 HYDROXY   Result Value Ref Range    VITAMIN D, 25-HYDROXY 24.7 (L) 30.0 - 100.0 ng/mL       Assessment and Plan  Diagnoses and all orders for this visit:    1. Essential hypertension  -     hydroCHLOROthiazide (HYDRODIURIL) 25 mg tablet; Take 1 Tab by mouth daily. -     potassium chloride (KLOR-CON) 10 mEq tablet; Take 1 Tab by mouth daily. 2. Hyperlipidemia, unspecified hyperlipidemia type  -     pravastatin (PRAVACHOL) 20 mg tablet; Take 1 Tab by mouth nightly. 3. Vitamin D deficiency  -     ergocalciferol (ERGOCALCIFEROL) 50,000 unit capsule; Take 1 Cap by mouth every seven (7) days. 4. Prediabetes    5. Left knee pain, unspecified chronicity  -     diclofenac EC (VOLTAREN) 50 mg EC tablet; Take 1 Tab by mouth two (2) times a day. As needed    6. Anemia, unspecified type  -     VITAMIN B12; Future  -     FOLATE; Future  -     FERRITIN; Future      1.6% 10 year risk of heart disease or stroke using the CV risk calculator. Discussed diet and exercise. Will start on statin as patient has been trying to reduce her cholesterol for over a year. Medication, side effects, possible allergic reactions and warnings reviewed with patient. Patient verbalized understanding. After care summary printed and reviewed with patient. Plan reviewed with patient. Questions answered. Patient verbalized understanding of plan and is in agreement with plan. Patient to follow up in three weeks or earlier if symptoms worsen.    LARRY Chavez

## 2019-03-02 LAB
FERRITIN SERPL-MCNC: 99 NG/ML (ref 10–291)
FOLATE,FOL: >20 NG/ML
VIT B12 SERPL-MCNC: 400 PG/ML (ref 211–911)

## 2019-03-20 ENCOUNTER — OFFICE VISIT (OUTPATIENT)
Dept: FAMILY MEDICINE CLINIC | Age: 44
End: 2019-03-20

## 2019-03-20 VITALS
RESPIRATION RATE: 18 BRPM | WEIGHT: 202.8 LBS | TEMPERATURE: 98.3 F | DIASTOLIC BLOOD PRESSURE: 84 MMHG | BODY MASS INDEX: 40.88 KG/M2 | HEIGHT: 59 IN | SYSTOLIC BLOOD PRESSURE: 141 MMHG | HEART RATE: 83 BPM

## 2019-03-20 DIAGNOSIS — D64.9 ANEMIA, UNSPECIFIED TYPE: ICD-10-CM

## 2019-03-20 DIAGNOSIS — Z71.2 ENCOUNTER TO DISCUSS TEST RESULTS: ICD-10-CM

## 2019-03-20 DIAGNOSIS — I10 ESSENTIAL HYPERTENSION: Primary | ICD-10-CM

## 2019-03-20 DIAGNOSIS — E78.5 HYPERLIPIDEMIA, UNSPECIFIED HYPERLIPIDEMIA TYPE: ICD-10-CM

## 2019-03-20 RX ORDER — MOMETASONE FUROATE 50 UG/1
2 SPRAY, METERED NASAL
COMMUNITY

## 2019-03-20 RX ORDER — TIZANIDINE 4 MG/1
4 TABLET ORAL
Refills: 1 | COMMUNITY
Start: 2019-02-19 | End: 2019-04-11

## 2019-03-20 NOTE — PROGRESS NOTES
Carmencita Mcgill presents today for   Chief Complaint   Patient presents with    Hypertension     follow up    Knee Pain     left    Labs     completed 3/1/19       Carmencita Mcgill preferred language for health care discussion is english/other. Is someone accompanying this pt? no    Is the patient using any DME equipment during [address]? no    Depression Screening:  3 most recent PHQ Screens 1/14/2019   PHQ Not Done -   Little interest or pleasure in doing things Not at all   Feeling down, depressed, irritable, or hopeless Not at all   Total Score PHQ 2 0       Learning Assessment:  Learning Assessment 3/20/2019   PRIMARY LEARNER Patient   HIGHEST LEVEL OF EDUCATION - PRIMARY LEARNER  4 YEARS OF COLLEGE   BARRIERS PRIMARY LEARNER NONE   CO-LEARNER CAREGIVER No   PRIMARY LANGUAGE ENGLISH    NEED -   LEARNER PREFERENCE PRIMARY LISTENING   ANSWERED BY self   RELATIONSHIP SELF       Abuse Screening:  Abuse Screening Questionnaire 1/14/2019   Do you ever feel afraid of your partner? N   Are you in a relationship with someone who physically or mentally threatens you? N   Is it safe for you to go home? Y           Coordination of Care:  1. Have you been to the ER, urgent care clinic since your last visit? Hospitalized since your last visit? no    2. Have you seen or consulted any other health care providers outside of the [address] [place] [address] since your last visit? Include any pap smears or colon screening. no    Per-NP. Scottie vides medication not taking to be deleted. Advance Directive:  1. Do you have an advance directive in place? Patient Reply:no    2. If not, would you like material regarding how to put one in place?  Patient Reply: no

## 2019-03-20 NOTE — PATIENT INSTRUCTIONS
High Blood Pressure: Care Instructions  Overview    It's normal for blood pressure to go up and down throughout the day. But if it stays up, you have high blood pressure. Another name for high blood pressure is hypertension. Despite what a lot of people think, high blood pressure usually doesn't cause headaches or make you feel dizzy or lightheaded. It usually has no symptoms. But it does increase your risk of stroke, heart attack, and other problems. You and your doctor will talk about your risks of these problems based on your blood pressure. Your doctor will give you a goal for your blood pressure. Your goal will be based on your health and your age. Lifestyle changes, such as eating healthy and being active, are always important to help lower blood pressure. You might also take medicine to reach your blood pressure goal.  Follow-up care is a key part of your treatment and safety. Be sure to make and go to all appointments, and call your doctor if you are having problems. It's also a good idea to know your test results and keep a list of the medicines you take. How can you care for yourself at home? Medical treatment  · If you stop taking your medicine, your blood pressure will go back up. You may take one or more types of medicine to lower your blood pressure. Be safe with medicines. Take your medicine exactly as prescribed. Call your doctor if you think you are having a problem with your medicine. · Talk to your doctor before you start taking aspirin every day. Aspirin can help certain people lower their risk of a heart attack or stroke. But taking aspirin isn't right for everyone, because it can cause serious bleeding. · See your doctor regularly. You may need to see the doctor more often at first or until your blood pressure comes down. · If you are taking blood pressure medicine, talk to your doctor before you take decongestants or anti-inflammatory medicine, such as ibuprofen.  Some of these medicines can raise blood pressure. · Learn how to check your blood pressure at home. Lifestyle changes  · Stay at a healthy weight. This is especially important if you put on weight around the waist. Losing even 10 pounds can help you lower your blood pressure. · If your doctor recommends it, get more exercise. Walking is a good choice. Bit by bit, increase the amount you walk every day. Try for at least 30 minutes on most days of the week. You also may want to swim, bike, or do other activities. · Avoid or limit alcohol. Talk to your doctor about whether you can drink any alcohol. · Try to limit how much sodium you eat to less than 2,300 milligrams (mg) a day. Your doctor may ask you to try to eat less than 1,500 mg a day. · Eat plenty of fruits (such as bananas and oranges), vegetables, legumes, whole grains, and low-fat dairy products. · Lower the amount of saturated fat in your diet. Saturated fat is found in animal products such as milk, cheese, and meat. Limiting these foods may help you lose weight and also lower your risk for heart disease. · Do not smoke. Smoking increases your risk for heart attack and stroke. If you need help quitting, talk to your doctor about stop-smoking programs and medicines. These can increase your chances of quitting for good. When should you call for help? Call 911 anytime you think you may need emergency care. This may mean having symptoms that suggest that your blood pressure is causing a serious heart or blood vessel problem. Your blood pressure may be over 180/120.   For example, call 911 if:    · You have symptoms of a heart attack. These may include:  ? Chest pain or pressure, or a strange feeling in the chest.  ? Sweating. ? Shortness of breath. ? Nausea or vomiting. ? Pain, pressure, or a strange feeling in the back, neck, jaw, or upper belly or in one or both shoulders or arms. ? Lightheadedness or sudden weakness.   ? A fast or irregular heartbeat.     · You have symptoms of a stroke. These may include:  ? Sudden numbness, tingling, weakness, or loss of movement in your face, arm, or leg, especially on only one side of your body. ? Sudden vision changes. ? Sudden trouble speaking. ? Sudden confusion or trouble understanding simple statements. ? Sudden problems with walking or balance. ? A sudden, severe headache that is different from past headaches.     · You have severe back or belly pain.    Do not wait until your blood pressure comes down on its own. Get help right away.   Call your doctor now or seek immediate care if:    · Your blood pressure is much higher than normal (such as 180/120 or higher), but you don't have symptoms.     · You think high blood pressure is causing symptoms, such as:  ? Severe headache.  ? Blurry vision.    Watch closely for changes in your health, and be sure to contact your doctor if:    · Your blood pressure measures higher than your doctor recommends at least 2 times. That means the top number is higher or the bottom number is higher, or both.     · You think you may be having side effects from your blood pressure medicine. Where can you learn more? Go to http://bashir-princess.info/. Enter T031 in the search box to learn more about \"High Blood Pressure: Care Instructions. \"  Current as of: July 22, 2018  Content Version: 11.9  © 5424-5511 [employer]. Care instructions adapted under license by [employer] (which disclaims liability or warranty for this information). If you have questions about a medical condition or this instruction, always ask your healthcare professional. [place] [address] any warranty or liability for your use of this information.

## 2019-03-20 NOTE — LETTER
NOTIFICATION RETURN TO WORK / SCHOOL 
 
3/20/2019 8:02 AM 
 
Ms. Daksha Perea [address] [address] To Whom It May Concern: 
 
Daksha Perea is currently under the care of [facility]. She will return to work/school on: 3/21/19 If there are questions or concerns please have the patient contact our office.  
 
 
 
Sincerely, 
 
 
Osmany Hernandez NP

## 2019-03-20 NOTE — PROGRESS NOTES
KOREY Cooney is a 39 y.o. female  Chief Complaint   Patient presents with    Hypertension     follow up    Knee Pain     left    Labs     completed 3/1/19     Reports she had a procedure for hip from pain management two days ago and her blood pressure has been elevated since. She denies chest pain and or shortness of breath. Denies swelling in her lower extremities. Requesting lab results. Knee pain is 6/10. She is following with pain management. She is taking the pravastatin ans she is not having any muscle cramps. Past Medical History  Past Medical History:   Diagnosis Date    Insomnia        Surgical History  Past Surgical History:   Procedure Laterality Date    PLASTIC SURGERY, NECK      [place] ANESTH,SURGERY OF SHOULDER          Medications  Current Outpatient Medications   Medication Sig Dispense Refill    mometasone (NASONEX) 50 mcg/actuation nasal spray 2 Sprays.  tiZANidine (ZANAFLEX) 4 mg tablet Take 4 mg by mouth every eight (8) hours as needed. 1    hydroCHLOROthiazide (HYDRODIURIL) 25 mg tablet Take 1 Tab by mouth daily. 30 Tab 0    ergocalciferol (ERGOCALCIFEROL) 50,000 unit capsule Take 1 Cap by mouth every seven (7) days. 8 Cap 0    diclofenac EC (VOLTAREN) 50 mg EC tablet Take 1 Tab by mouth two (2) times a day. As needed 30 Tab 0    pravastatin (PRAVACHOL) 20 mg tablet Take 1 Tab by mouth nightly. 30 Tab 0    potassium chloride (KLOR-CON) 10 mEq tablet Take 1 Tab by mouth daily.  30 Tab 0    montelukast (SINGULAIR) 10 mg tablet TAKE 1 TABLET BY MOUTH ONCE EVERY EVENING  3    omeprazole (PRILOSEC) 20 mg capsule TAKE ONE CAPSULE BY MOUTH TWICE A DAY  2    lidocaine (LIDODERM) 5 %       hydrOXYzine pamoate (VISTARIL) 50 mg capsule TAKE 2 CAPSULES BY MOUTH EVERY DAY AT BEDTIME  5    FLOVENT HFA 44 mcg/actuation inhaler TAKE 2 PUFFS BY MOUTH TWICE A DAY  6    amitriptyline (ELAVIL) 25 mg tablet PLEASE SEE ATTACHED FOR DETAILED DIRECTIONS  0    ALPRAZolam (XANAX) 0.5 mg tablet Take  by mouth.  albuterol (PROVENTIL VENTOLIN) 2.5 mg /3 mL (0.083 %) nebulizer solution USE 1 VIAL VIA NEBULIZER BY MOUTH EVERY 4 TO 6 HOURS AS NEEDED  6    VENTOLIN HFA 90 mcg/actuation inhaler TAKE 2 PUFFS BY MOUTH EVERY 4-6 HOURS AS NEEDED FOR WHEEZING  5    cyclobenzaprine (FLEXERIL) 10 mg tablet Take 1 Tab by mouth two (2) times daily as needed for Muscle Spasm(s). 30 Tab 0    beclomethasone (QVAR) 40 mcg/actuation aero Take 1 Puff by inhalation two (2) times a day.  traZODone (DESYREL) 50 mg tablet TAKE 1-3 TABLETS BY MOUTH AT BEDTIME. 5    estradiol (ESTRACE) 1 mg tablet Take 1 mg by mouth daily.  zolpidem (AMBIEN) 10 mg tablet TAKE 1 TABLET BY MOUTH EVERY OTHER DAY  2       Allergies  Allergies   Allergen Reactions    Doripenem Unknown (comments)     Other reaction(s):  HIVES/ITCHING    Dilaudid [Hydromorphone] Hives       Family History  Family History   Problem Relation Age of Onset    Hypertension Mother     Hypertension Father     Cancer Father         prostate       Social History  Social History     Socioeconomic History    Marital status: SINGLE     Spouse name: Not on file    Number of children: Not on file    Years of education: Not on file    Highest education level: Not on file   Occupational History    Not on file   Social Needs    Financial resource strain: Not on file    Food insecurity:     Worry: Not on file     Inability: Not on file    Transportation needs:     Medical: Not on file     Non-medical: Not on file   Tobacco Use    Smoking status: Never Smoker    Smokeless tobacco: Never Used   Substance and Sexual Activity    Alcohol use: No    Drug use: No    Sexual activity: Not on file   Lifestyle    Physical activity:     Days per week: Not on file     Minutes per session: Not on file    Stress: Not on file   Relationships    Social connections:     Talks on phone: Not on file     Gets together: Not on file     Attends [employer] service: Not on file     Active member of club or organization: Not on file     Attends meetings of clubs or organizations: Not on file     Relationship status: Not on file    Intimate partner violence:     Fear of current or ex partner: Not on file     Emotionally abused: Not on file     Physically abused: Not on file     Forced sexual activity: Not on file   Other Topics Concern    Not on file   Social History Narrative    Not on file       Problem List  Patient Active Problem List   Diagnosis Code    Severe obesity (BMI 35.0-39. 9) E66.01       Review of Systems  Review of Systems   Eyes: Negative for blurred vision. Respiratory: Negative for shortness of breath. Cardiovascular: Negative for chest pain. Gastrointestinal: Negative for abdominal pain, nausea and vomiting. Musculoskeletal: Positive for joint pain. Negative for myalgias. Neurological: Negative for dizziness. Vital Signs  Vitals:    03/20/19 0742   BP: 141/84   Pulse: 83   Resp: 18   Temp: 98.3 °F (36.8 °C)   TempSrc: Oral   Weight: 202 lb 12.8 oz (92 kg)   Height: 4' 11\" (1.499 m)   PainSc:   6   PainLoc: Knee       Physical Exam  Physical Exam   Constitutional: She is oriented to person, place, and time. HENT:   Mouth/Throat: Oropharynx is clear and moist.   Eyes: Pupils are equal, round, and reactive to light. Cardiovascular: Normal rate, regular rhythm, normal heart sounds and intact distal pulses. Pulmonary/Chest: Effort normal and breath sounds normal.   Abdominal: Soft. Bowel sounds are normal.   Musculoskeletal: She exhibits no edema. Neurological: She is alert and oriented to person, place, and time. Skin: Skin is warm and dry. Psychiatric: She has a normal mood and affect. Her behavior is normal.       Diagnostics  Orders Placed This Encounter    [facility] Blood Pressure Flowsheet     Order Specific Question:   After how many readings would you like to receive a notification of this patient's entries?      Answer:   7    mometasone (NASONEX) 50 mcg/actuation nasal spray     Si Sprays.  tiZANidine (ZANAFLEX) 4 mg tablet     Sig: Take 4 mg by mouth every eight (8) hours as needed. Refill:  1       Results  Results for orders placed or performed in visit on 19   VITAMIN B12 & FOLATE   Result Value Ref Range    Vitamin B12 400 211 - 911 pg/mL    Folate >20.00 >=3.10 ng/mL       Assessment and Plan  Diagnoses and all orders for this visit:    1. Essential hypertension  -     [facility] MYCBannerT BP FLOWSHEET    2. Anemia, unspecified type    3. Encounter to discuss test results    4. Hyperlipidemia, unspecified hyperlipidemia type      Continue to follow up with pain management. Take cholesterol medications as prescribed. After care summary printed and reviewed with patient. Plan reviewed with patient. Questions answered. Patient verbalized understanding of plan and is in agreement with plan. Patient to follow up in two weeks or earlier if symptoms worsen.    Follow-up Disposition: Not on File  Ulis Severin, FNP-C

## 2019-03-22 RX ORDER — HYDROCHLOROTHIAZIDE 50 MG/1
50 TABLET ORAL DAILY
Qty: 30 TAB | Refills: 0 | Status: SHIPPED | OUTPATIENT
Start: 2019-03-22 | End: 2019-06-03 | Stop reason: SDUPTHER

## 2019-04-11 ENCOUNTER — OFFICE VISIT (OUTPATIENT)
Dept: SURGERY | Age: 44
End: 2019-04-11

## 2019-04-11 ENCOUNTER — HOSPITAL ENCOUNTER (OUTPATIENT)
Dept: LAB | Age: 44
Discharge: HOME | End: 2019-04-11
Payer: MEDICAID

## 2019-04-11 VITALS
WEIGHT: 207 LBS | HEIGHT: 59 IN | SYSTOLIC BLOOD PRESSURE: 106 MMHG | BODY MASS INDEX: 41.73 KG/M2 | TEMPERATURE: 97.6 F | RESPIRATION RATE: 16 BRPM | DIASTOLIC BLOOD PRESSURE: 78 MMHG | HEART RATE: 77 BPM | OXYGEN SATURATION: 98 %

## 2019-04-11 DIAGNOSIS — E66.01 MORBID OBESITY (HCC): ICD-10-CM

## 2019-04-11 DIAGNOSIS — I10 ESSENTIAL HYPERTENSION: ICD-10-CM

## 2019-04-11 DIAGNOSIS — E66.01 MORBID OBESITY (HCC): Primary | ICD-10-CM

## 2019-04-11 LAB
25(OH)D3 SERPL-MCNC: 42.5 NG/ML (ref 30–100)
ALBUMIN SERPL-MCNC: 2.9 G/DL (ref 3.4–5)
ANION GAP SERPL CALC-SCNC: 6 MMOL/L (ref 3–18)
ATRIAL RATE: 70 BPM
BASOPHILS # BLD: 0 K/UL (ref 0–0.1)
BASOPHILS NFR BLD: 0 % (ref 0–2)
BUN SERPL-MCNC: 14 MG/DL (ref 7–18)
BUN/CREAT SERPL: 15 (ref 12–20)
CALCIUM SERPL-MCNC: 8.7 MG/DL (ref 8.5–10.1)
CALCULATED P AXIS, ECG09: 36 DEGREES
CALCULATED R AXIS, ECG10: 36 DEGREES
CALCULATED T AXIS, ECG11: 20 DEGREES
CHLORIDE SERPL-SCNC: 103 MMOL/L (ref 100–108)
CO2 SERPL-SCNC: 29 MMOL/L (ref 21–32)
CREAT SERPL-MCNC: 0.94 MG/DL (ref 0.6–1.3)
DIAGNOSIS, 93000: NORMAL
DIFFERENTIAL METHOD BLD: ABNORMAL
EOSINOPHIL # BLD: 0.1 K/UL (ref 0–0.4)
EOSINOPHIL NFR BLD: 1 % (ref 0–5)
ERYTHROCYTE [DISTWIDTH] IN BLOOD BY AUTOMATED COUNT: 16 % (ref 11.6–14.5)
GLUCOSE SERPL-MCNC: 85 MG/DL (ref 74–99)
HCT VFR BLD AUTO: 35.2 % (ref 35–45)
HGB BLD-MCNC: 11 G/DL (ref 12–16)
IRON SERPL-MCNC: 33 UG/DL (ref 50–175)
LYMPHOCYTES # BLD: 1.8 K/UL (ref 0.9–3.6)
LYMPHOCYTES NFR BLD: 21 % (ref 21–52)
MCH RBC QN AUTO: 25.3 PG (ref 24–34)
MCHC RBC AUTO-ENTMCNC: 31.3 G/DL (ref 31–37)
MCV RBC AUTO: 80.9 FL (ref 74–97)
MONOCYTES # BLD: 0.6 K/UL (ref 0.05–1.2)
MONOCYTES NFR BLD: 7 % (ref 3–10)
NEUTS SEG # BLD: 6 K/UL (ref 1.8–8)
NEUTS SEG NFR BLD: 71 % (ref 40–73)
P-R INTERVAL, ECG05: 144 MS
PLATELET # BLD AUTO: 356 K/UL (ref 135–420)
PMV BLD AUTO: 9.4 FL (ref 9.2–11.8)
POTASSIUM SERPL-SCNC: 3.4 MMOL/L (ref 3.5–5.5)
Q-T INTERVAL, ECG07: 386 MS
QRS DURATION, ECG06: 84 MS
QTC CALCULATION (BEZET), ECG08: 416 MS
RBC # BLD AUTO: 4.35 M/UL (ref 4.2–5.3)
SODIUM SERPL-SCNC: 138 MMOL/L (ref 136–145)
VENTRICULAR RATE, ECG03: 70 BPM
WBC # BLD AUTO: 8.5 K/UL (ref 4.6–13.2)

## 2019-04-11 PROCEDURE — 85025 COMPLETE CBC W/AUTO DIFF WBC: CPT

## 2019-04-11 PROCEDURE — 36415 COLL VENOUS BLD VENIPUNCTURE: CPT

## 2019-04-11 PROCEDURE — 93005 ELECTROCARDIOGRAM TRACING: CPT

## 2019-04-11 PROCEDURE — 82040 ASSAY OF SERUM ALBUMIN: CPT

## 2019-04-11 PROCEDURE — 84425 ASSAY OF VITAMIN B-1: CPT

## 2019-04-11 PROCEDURE — 82306 VITAMIN D 25 HYDROXY: CPT

## 2019-04-11 PROCEDURE — 86677 HELICOBACTER PYLORI ANTIBODY: CPT

## 2019-04-11 PROCEDURE — 80048 BASIC METABOLIC PNL TOTAL CA: CPT

## 2019-04-11 PROCEDURE — 83540 ASSAY OF IRON: CPT

## 2019-04-11 NOTE — PROGRESS NOTES
Initial Consultation for Bariatric Surgery Template (Gastric Bypass)    Romy Owen is a 39 y.o. female who comes into the office today for initial consultation for the surgical options for the treatment of morbid obesity. The patient initially identified obesity at the age of 35 and at age 24 weighed 120 lbs. She has tried a variety of unsupervised weight-loss attempts including Weight Watchers, registered dietician, family physician, Medi-Fast and phentermine, but has yet to meet with lasting success. Maximum weight lost on a diet is about 10 lbs, but that the weight loss always seems to return. Today, the patient is  Height: 4' 11\" (149.9 cm) tall, Weight: 93.9 kg (207 lb) lbs for a Body mass index is 41.81 kg/m². It is due to the patient's severe obesity, which is further complicated by hypertension  that the patient is now seeking out bariatric surgery, specifically, Lap Band. Past Medical History:   Diagnosis Date    Hypertension     Insomnia        Past Surgical History:   Procedure Laterality Date    HX CERVICAL FUSION      HX HYSTERECTOMY      total    HX OOPHORECTOMY      completion    PLASTIC SURGERY, NECK      DC ANESTH,SURGERY OF SHOULDER         Current Outpatient Medications   Medication Sig Dispense Refill    hydroCHLOROthiazide (HYDRODIURIL) 50 mg tablet Take 1 Tab by mouth daily. 30 Tab 0    mometasone (NASONEX) 50 mcg/actuation nasal spray 2 Sprays.  ergocalciferol (ERGOCALCIFEROL) 50,000 unit capsule Take 1 Cap by mouth every seven (7) days. 8 Cap 0    pravastatin (PRAVACHOL) 20 mg tablet Take 1 Tab by mouth nightly. 30 Tab 0    potassium chloride (KLOR-CON) 10 mEq tablet Take 1 Tab by mouth daily.  30 Tab 0    montelukast (SINGULAIR) 10 mg tablet TAKE 1 TABLET BY MOUTH ONCE EVERY EVENING  3    omeprazole (PRILOSEC) 20 mg capsule TAKE ONE CAPSULE BY MOUTH TWICE A DAY  2    lidocaine (LIDODERM) 5 %       hydrOXYzine pamoate (VISTARIL) 50 mg capsule TAKE 2 CAPSULES BY MOUTH EVERY DAY AT BEDTIME  5    FLOVENT HFA 44 mcg/actuation inhaler TAKE 2 PUFFS BY MOUTH TWICE A DAY  6    albuterol (PROVENTIL VENTOLIN) 2.5 mg /3 mL (0.083 %) nebulizer solution USE 1 VIAL VIA NEBULIZER BY MOUTH EVERY 4 TO 6 HOURS AS NEEDED  6    beclomethasone (QVAR) 40 mcg/actuation aero Take 1 Puff by inhalation two (2) times a day.  traZODone (DESYREL) 50 mg tablet TAKE 1-3 TABLETS BY MOUTH AT BEDTIME. 5    estradiol (ESTRACE) 1 mg tablet Take 1 mg by mouth daily. Allergies   Allergen Reactions    Doripenem Unknown (comments)     Other reaction(s):  HIVES/ITCHING    Dilaudid [Hydromorphone] Hives       Social History     Tobacco Use    Smoking status: Never Smoker    Smokeless tobacco: Never Used   Substance Use Topics    Alcohol use: No    Drug use: No       Family History   Problem Relation Age of Onset    Hypertension Mother     Hypertension Father     Cancer Father         prostate       Family Status   Relation Name Status    Mother  [de-identified]    Father  Alive       Review of Systems:  Positive in BOLD    CONST: Fever, weight loss, fatigue or chills  GI: Nausea, vomiting, abdominal pain, change in bowel habits, hematochezia, melena, and GERD   INTEG: Dermatitis, abnormal moles  HEENT: Recent changes in vision, vertigo, epistaxis, dysphagia and hoarseness  CV: Chest pain, palpitations, HTN, edema and varicosities  RESP: Cough, shortness of breath, wheezing, hemoptysis, snoring and reactive airway disease  : Hematuria, dysuria, frequency, urgency, nocturia and stress urinary incontinence   MS: Weakness, joint pain and arthritis  ENDO: Diabetes, thyroid disease, polyuria, polydipsia, polyphagia, poor wound healing, heat intolerance, cold intolerance  LYMPH/HEME: Anemia, bruising and history of blood transfusions  NEURO: Dizziness, headache, fainting, seizures and stroke  PSYCH: Anxiety and depression      Physical Exam    Visit Vitals  /78   Pulse 77   Temp 97.6 °F (36.4 °C) (Oral)   Resp 16   Ht 4' 11\" (1.499 m)   Wt 93.9 kg (207 lb)   SpO2 98%   BMI 41.81 kg/m²       Pre op weight: 207  EBW: 90  Wt loss to date: 0       General: 39 y.o.) female in no acute distress. Morbidly obese in abdomen and hips - gynecoid pattern  HEENT: Normocephalic, atraumatic, Pupils equal and reactive, nasopharynx clear, oropharynx clear and moist without lesions  NECK: Supple, no lymphadenopathy, thyromegaly, carotid bruits or jugular venous distension. trachea midline  RESP: Clear to auscultation bilaterally, no wheezes, rhonchi, or rales, normal respiratory excursion  CV: Regular rate and rhythm, no murmurs, rubs or gallops. 3+/4 pulses in bilateral dorsalis pedis and posterior tibialis. No distal edema or varicosities. ABD: Soft, nontender, nondistended, normoactive bowel sounds, no hernias, no hepatosplenomegaly, easily palpable costal margins, gynecoid distribution, healed pfannenstiel incision  Extremities: Warm, well perfused, no tenderness or swelling, normal gait/station  Neuro: Sensation and strength grossly intact and symmetrical  Psych: Alert and oriented to person, place, and time. Impression:    Nelda Lambert is a 39 y.o. female who is suffering from morbid obesity with a BMI of 42  and comorbidities including hypertension, reactive airway disease and weight related arthopathies  who would benefit from bariatric surgery. We have had an extensive discussion with regard to the risks, benefits and likely outcomes of the operation. We've discussed the restrictive and malabsorptive nature of the gastric bypass and compared and contrasted with the sleeve gastrectomy. The patient understands the likelihood of losing approximately 80% of their excess weight in 12 to 18 months.  She also understands the risks including but not limited to bleeding, infection, need for reoperation, ulcers, leaks and strictures, bowel obstruction secondary to adhesions and internal hernias, DVT, PE, heart attack, stroke, and death. Patient also understands risks of inadequate weight loss, excess weight loss, vitamin insufficiency, protein malnutrition, excess skin, and loss of hair. We have reviewed the components of a successful postoperative course including requirement for a high protein, low carbohydrate diet, 60 oz a day of zero calorie liquids, daily vitamin supplementation, daily exercise, regular follow-up, and participation in support groups. At this time we will enroll the patient in our bariatric program, undertake routine laboratory evaluation, chest X-ray, EKG, possible UGI and evaluation by  nutritionist as well as psychologist and pending their satisfactory completion of the preop evaluation, plan to perform a gastric bypass.

## 2019-04-11 NOTE — LETTER
4/11/19 Patient: Cinda Waller YOB: 1975 Date of Visit: 4/11/2019 Sue BendererBRIANNA 
[address] [id number] [address] [place] [address] VIA In Basket Dear Sue Sender, BRIANNA, Thank you for referring Ms. Cinda Waller to [place] [address] for evaluation. My notes for this consultation are attached. If you have questions, please do not hesitate to call me. I look forward to following your patient along with you.  
 
 
Sincerely, 
 
Donald Fitzgerald MD

## 2019-04-11 NOTE — PROGRESS NOTES
Chief Complaint   Patient presents with    Advice Only     online seminar confirmed     Pt ID confirmed    Weight Loss Metrics 4/11/2019 3/20/2019 2/27/2019 2/20/2019 2/11/2019 1/14/2019 12/20/2018   Today's Wt 207 lb 202 lb 12.8 oz 198 lb 3.2 oz 197 lb 193 lb - 193 lb 9.6 oz   BMI 41.81 kg/m2 40.96 kg/m2 40.03 kg/m2 39.79 kg/m2 38.98 kg/m2 39.1 kg/m2 39.1 kg/m2       Body mass index is 41.81 kg/m².

## 2019-04-12 ENCOUNTER — DOCUMENTATION ONLY (OUTPATIENT)
Dept: BARIATRICS/WEIGHT MGMT | Age: 44
End: 2019-04-12

## 2019-04-12 ENCOUNTER — HOSPITAL ENCOUNTER (OUTPATIENT)
Dept: BARIATRICS/WEIGHT MGMT | Age: 44
Discharge: HOME | End: 2019-04-12

## 2019-04-12 NOTE — PROGRESS NOTES
[address] [place] [facility] [address] [facility], [address]    Patient's Name: Jean Carlos Cage   Age: 39 y.o. YOB: 1975   Sex: female    Date:   4/12/2019    Insurance:              Session: 1 of 6  Revision:     Surgeon:  Dr. Isidra Gray    Height: 4 f 11   Weight:    206      Lbs. BMI: 41.7   Pounds Lost since last month: 1                 Pounds Gained since last month: 0    Starting Weight: 207     Previous Months Weight: 206  Overall Pounds Lost: 1   Overall Pounds Gained: 0    Do you smoke? None    Alcohol intake:  Number of drinks at a time:  None  Number of times a week: None    Class Guidelines    Guidelines are reviewed with patient at the start of every class. 1. Patient understands that weight loss trial classes must be consecutive. Patient understands if they miss a class, it is their responsibility to contact me to reschedule class. I will reach out to patient after their first no show. 2.  Patient understands the expectations that weight maintenance/weight loss is expected during the classes. Failure to demonstrate changes may result in one extra month of weight loss trial, followed by going back to see the surgeon. Patient understands that they CAN NOT gain any weight during the weight loss trial.  Gaining weight will result in extra classes. 3. Patient is also instructed to be doing their labs, blood work, psych visit, support group and any other test that the surgeon has used while they are working on their weight loss trial.  4.  Patient was instructed to bring their blue binder to every class and appointment. Other Pertinent Information:     Changes Made Since Last Class: Started going to the gym. Eating Habits and Behaviors      We started off class today by reviewing key diet principles.   Patient was given a very specific list of foods that they can eat, which included meat, fish, vegetables, eggs, cheese, fats, soy, and berries. Patient was also given a list of foods that should be avoided. These included sweets (candy, soda, baked goods, ice cream), and starches, including pasta, rice, crackers, chips, oatmeal, bread. We talked about appropriate protein-based snacks, including deli meat, low fat cheese, yogurt, hummus, small handful of almonds. I also gave a power point on ways to help with the metabolism. Some of the food-related ways included: Healthy snacking, eating adequate protein, and getting adequate water. Mild dehydration may slow down one's weight loss. Patient's current diet habits include: 3 meals per day. She is eating boiled eggs and fruit for breakfast.  Lunch is baked chicken and salad. Dinner is salad and water. She is snacking on carrots. She is eating [employer] 1x a week. She is drinking 2 sodas, which I have addressed with her and made alternative recommendations. Physical Activity/Exercise    Comments:     Currently for exercise, patient is going to the gym daily for 1 hour. We talked about activities for patient to do, including walking, swimming, or chair exercises. I also talked with patient about doing some strength training, which helps the metabolism, as well. Behavior Modification       Comments: In the power point, Mastering Your Metabolism, I also gave behaviors that can help with one's metabolism. These include not skipping meals and being sure to feed and fuel that body rather than going large gaps and putting the body in starvation mode. One goal for next month includes:  1. Cut out candy.   2. Cut of sodas      Apollo Gifford, MS RD  4/12/2019

## 2019-04-15 LAB
H PYLORI IGA SER-ACNC: <9 UNITS (ref 0–8.9)
H PYLORI IGM SER-ACNC: <9 UNITS (ref 0–8.9)

## 2019-04-16 LAB — VIT B1 BLD-SCNC: 141.2 NMOL/L (ref 66.5–200)

## 2019-04-30 NOTE — PROGRESS NOTES
4/30/19:  Patient was contacted regarding her blood work. Patient was instructed to do 65 mg of iron or the BA 60 mg iron, which she is going to look into. Normal: . Patient's level is 33.     Fariha Galeana MS RD

## 2019-05-07 ENCOUNTER — OFFICE VISIT (OUTPATIENT)
Dept: FAMILY MEDICINE CLINIC | Age: 44
End: 2019-05-07

## 2019-05-07 VITALS
DIASTOLIC BLOOD PRESSURE: 84 MMHG | HEART RATE: 50 BPM | WEIGHT: 206 LBS | HEIGHT: 59 IN | RESPIRATION RATE: 18 BRPM | BODY MASS INDEX: 41.53 KG/M2 | SYSTOLIC BLOOD PRESSURE: 142 MMHG | TEMPERATURE: 98.2 F | OXYGEN SATURATION: 100 %

## 2019-05-07 DIAGNOSIS — Z71.3 WEIGHT LOSS COUNSELING, ENCOUNTER FOR: ICD-10-CM

## 2019-05-07 RX ORDER — EPINEPHRINE 0.3 MG/.3ML
INJECTION INTRAMUSCULAR
Refills: 1 | COMMUNITY
Start: 2019-05-02

## 2019-05-07 RX ORDER — AMITRIPTYLINE HYDROCHLORIDE 25 MG/1
TABLET, FILM COATED ORAL
Refills: 0 | COMMUNITY
Start: 2019-03-03 | End: 2019-05-07 | Stop reason: ALTCHOICE

## 2019-05-07 RX ORDER — FLUTICASONE FUROATE AND VILANTEROL TRIFENATATE 200; 25 UG/1; UG/1
POWDER RESPIRATORY (INHALATION)
Refills: 5 | COMMUNITY
Start: 2019-04-25

## 2019-05-07 RX ORDER — PREDNISONE 20 MG/1
TABLET ORAL
Refills: 0 | COMMUNITY
Start: 2019-04-10 | End: 2019-06-13

## 2019-05-07 RX ORDER — PREDNISONE 10 MG/1
TABLET ORAL
Refills: 0 | COMMUNITY
Start: 2019-05-03 | End: 2019-06-13

## 2019-05-07 RX ORDER — CARBINOXAMINE MALEATE 4 MG/1
TABLET ORAL
Refills: 1 | COMMUNITY
Start: 2019-04-26 | End: 2019-07-12

## 2019-05-07 NOTE — PROGRESS NOTES
Matty Lerma presents today for   Chief Complaint   Patient presents with    Surgical Clearance     clearance for weight loss        Is someone accompanying this pt? No    Is the patient using any DME equipment during OV? No    Depression Screening:  3 most recent PHQ Screens 1/14/2019   PHQ Not Done -   Little interest or pleasure in doing things Not at all   Feeling down, depressed, irritable, or hopeless Not at all   Total Score PHQ 2 0       Learning Assessment:  Learning Assessment 4/11/2019   PRIMARY LEARNER Patient   HIGHEST LEVEL OF EDUCATION - PRIMARY LEARNER  -   BARRIERS PRIMARY LEARNER NONE   CO-LEARNER CAREGIVER -   PRIMARY LANGUAGE ENGLISH    NEED -   LEARNER PREFERENCE PRIMARY DEMONSTRATION   ANSWERED BY self   RELATIONSHIP SELF       Abuse Screening:  Abuse Screening Questionnaire 1/14/2019   Do you ever feel afraid of your partner? N   Are you in a relationship with someone who physically or mentally threatens you? N   Is it safe for you to go home? Y         Health Maintenance Due   Topic Date Due    DTaP/Tdap/Td series (1 - Tdap) 06/15/1995   . Health Maintenance reviewed and discussed and ordered per Provider. Coordination of Care  1. Have you been to the ER, urgent care clinic since your last visit? Hospitalized since your last visit? No    2. Have you seen or consulted any other health care providers outside of the [address] [place] [address] since your last visit? Include any pap smears or colon screening. No          Advance Directive:  1. Do you have an advance directive in place? Patient Reply:No    2. If not, would you like material regarding how to put one in place?  Patient Reply: No

## 2019-05-07 NOTE — PROGRESS NOTES
KOREY Michael is a 39 y.o. female  Chief Complaint   Patient presents with    Surgical Clearance     clearance for weight loss      Reports her surgery will not be for another 6 months. She states she has to be cleared for surgery in advance. Reports she started the program on April 12th and she sees the surgical specialist on July 12th. Reports she is still seeing the nutrionist and she has an appointment to see the psychiatrist on the 20th. Reports her insurance will make her weight 6 months. Reports she is working out and she has changed her eating habits. She denies chest pain, shortness of breath, nausea, vomiting, or abdominal pain. Past Medical History  Past Medical History:   Diagnosis Date    Hypertension     Insomnia        Surgical History  Past Surgical History:   Procedure Laterality Date    HX CERVICAL FUSION      HX HYSTERECTOMY      total    HX OOPHORECTOMY      completion    PLASTIC SURGERY, NECK      VT ANESTH,SURGERY OF SHOULDER          Medications  Current Outpatient Medications   Medication Sig Dispense Refill    carbinoxamine maleate 4 mg tab TAKE 1 TABLET BY MOUTH EVERY 6 HOURS AS NEEDED FOR 30 DAYS  1    EPIPEN 2-SOCORRO 0.3 mg/0.3 mL injection INJECT INTO LATERAL THIGH AS NEEDED FOR ANAPHYLAXIS  1    BREO ELLIPTA 200-25 mcg/dose inhaler INHALE 1 PUFF BY MOUTH ONCE A DAY. RINSE AND SPIT AFTER USE FOR 30 DAYS  5    predniSONE (DELTASONE) 20 mg tablet TAKE 2 TABLETS BY MOUTH ONCE A DAY BEFORE A MEAL FOR 5 DAYS  0    tiotropium bromide (SPIRIVA RESPIMAT) 1.25 mcg/actuation inhaler Take 2 Puffs by inhalation daily.  hydroCHLOROthiazide (HYDRODIURIL) 50 mg tablet Take 1 Tab by mouth daily. 30 Tab 0    mometasone (NASONEX) 50 mcg/actuation nasal spray 2 Sprays.       montelukast (SINGULAIR) 10 mg tablet TAKE 1 TABLET BY MOUTH ONCE EVERY EVENING  3    omeprazole (PRILOSEC) 20 mg capsule TAKE ONE CAPSULE BY MOUTH TWICE A DAY  2    lidocaine (LIDODERM) 5 %       hydrOXYzine pamoate (VISTARIL) 50 mg capsule TAKE 2 CAPSULES BY MOUTH EVERY DAY AT BEDTIME  5    FLOVENT HFA 44 mcg/actuation inhaler TAKE 2 PUFFS BY MOUTH TWICE A DAY  6    albuterol (PROVENTIL VENTOLIN) 2.5 mg /3 mL (0.083 %) nebulizer solution USE 1 VIAL VIA NEBULIZER BY MOUTH EVERY 4 TO 6 HOURS AS NEEDED  6    traZODone (DESYREL) 50 mg tablet TAKE 1-3 TABLETS BY MOUTH AT BEDTIME. 5    estradiol (ESTRACE) 1 mg tablet Take 1 mg by mouth daily.  predniSONE (DELTASONE) 10 mg tablet TAKE 4 TABLET BY MOUTH ONCE A DAY (BEFORE A MEAL) FOR 5 DAYS  0    pravastatin (PRAVACHOL) 20 mg tablet Take 1 Tab by mouth nightly. 30 Tab 0    potassium chloride (KLOR-CON) 10 mEq tablet Take 1 Tab by mouth daily.  30 Tab 0       Allergies  Allergies   Allergen Reactions    Dilaudid [Hydromorphone] Hives       Family History  Family History   Problem Relation Age of Onset    Hypertension Mother     Hypertension Father     Cancer Father         prostate       Social History  Social History     Socioeconomic History    Marital status: SINGLE     Spouse name: Not on file    Number of children: Not on file    Years of education: Not on file    Highest education level: Not on file   Occupational History    Not on file   Social Needs    Financial resource strain: Not on file    Food insecurity:     Worry: Not on file     Inability: Not on file    Transportation needs:     Medical: Not on file     Non-medical: Not on file   Tobacco Use    Smoking status: Never Smoker    Smokeless tobacco: Never Used   Substance and Sexual Activity    Alcohol use: No    Drug use: No    Sexual activity: Not on file   Lifestyle    Physical activity:     Days per week: Not on file     Minutes per session: Not on file    Stress: Not on file   Relationships    Social connections:     Talks on phone: Not on file     Gets together: Not on file     Attends Yarsani service: Not on file     Active member of club or organization: Not on file     Attends meetings of clubs or organizations: Not on file     Relationship status: Not on file    Intimate partner violence:     Fear of current or ex partner: Not on file     Emotionally abused: Not on file     Physically abused: Not on file     Forced sexual activity: Not on file   Other Topics Concern    Not on file   Social History Narrative    Not on file       Problem List  Patient Active Problem List   Diagnosis Code    Severe obesity (BMI 35.0-39. 9) E66.01    Morbid obesity (HCC) E66.01    BMI 40.0-44.9, adult ([facility] Z68.41    Essential hypertension I10       Review of Systems  Review of Systems   Constitutional: Negative for chills and fever. Eyes: Negative for blurred vision. Respiratory: Negative for cough, shortness of breath and wheezing. Cardiovascular: Negative for chest pain and palpitations. Gastrointestinal: Negative for abdominal pain, nausea and vomiting. Genitourinary: Negative. Musculoskeletal: Positive for back pain. Neurological: Negative for dizziness. Vital Signs  Vitals:    05/07/19 0902 05/07/19 0919   BP: 151/82 142/84   Pulse: (!) 50    Resp: 18    Temp: 98.2 °F (36.8 °C)    TempSrc: Oral    SpO2: 100%    Weight: 206 lb (93.4 kg)    Height: 4' 11\" (1.499 m)    PainSc:   0 - No pain        Physical Exam  Physical Exam   Constitutional: She is oriented to person, place, and time. HENT:   Mouth/Throat: Oropharynx is clear and moist.   Eyes: Pupils are equal, round, and reactive to light. Cardiovascular: Normal rate, regular rhythm and normal heart sounds. Pulmonary/Chest: Effort normal and breath sounds normal.   Abdominal: Soft. Bowel sounds are normal. She exhibits no distension. Neurological: She is alert and oriented to person, place, and time. Psychiatric: She has a normal mood and affect. Her behavior is normal.   Vitals reviewed.       Diagnostics  Orders Placed This Encounter    DISCONTD: amitriptyline (ELAVIL) 25 mg tablet     Sig: PLEASE SEE ATTACHED FOR DETAILED DIRECTIONS     Refill:  0    carbinoxamine maleate 4 mg tab     Sig: TAKE 1 TABLET BY MOUTH EVERY 6 HOURS AS NEEDED FOR 30 DAYS     Refill:  1    EPIPEN 2-SOCORRO 0.3 mg/0.3 mL injection     Sig: INJECT INTO LATERAL THIGH AS NEEDED FOR ANAPHYLAXIS     Refill:  1    BREO ELLIPTA 200-25 mcg/dose inhaler     Sig: INHALE 1 PUFF BY MOUTH ONCE A DAY. RINSE AND SPIT AFTER USE FOR 30 DAYS     Refill:  5    predniSONE (DELTASONE) 10 mg tablet     Sig: TAKE 4 TABLET BY MOUTH ONCE A DAY (BEFORE A MEAL) FOR 5 DAYS     Refill:  0    predniSONE (DELTASONE) 20 mg tablet     Sig: TAKE 2 TABLETS BY MOUTH ONCE A DAY BEFORE A MEAL FOR 5 DAYS     Refill:  0    tiotropium bromide (SPIRIVA RESPIMAT) 1.25 mcg/actuation inhaler     Sig: Take 2 Puffs by inhalation daily. Results  Results for orders placed or performed during the hospital encounter of 04/11/19   ALBUMIN   Result Value Ref Range    Albumin 2.9 (L) 3.4 - 5.0 g/dL   CBC WITH AUTOMATED DIFF   Result Value Ref Range    WBC 8.5 4.6 - 13.2 K/uL    RBC 4.35 4.20 - 5.30 M/uL    HGB 11.0 (L) 12.0 - 16.0 g/dL    HCT 35.2 35.0 - 45.0 %    MCV 80.9 74.0 - 97.0 FL    MCH 25.3 24.0 - 34.0 PG    MCHC 31.3 31.0 - 37.0 g/dL    RDW 16.0 (H) 11.6 - 14.5 %    PLATELET 297 936 - 861 K/uL    MPV 9.4 9.2 - 11.8 FL    NEUTROPHILS 71 40 - 73 %    LYMPHOCYTES 21 21 - 52 %    MONOCYTES 7 3 - 10 %    EOSINOPHILS 1 0 - 5 %    BASOPHILS 0 0 - 2 %    ABS. NEUTROPHILS 6.0 1.8 - 8.0 K/UL    ABS. LYMPHOCYTES 1.8 0.9 - 3.6 K/UL    ABS. MONOCYTES 0.6 0.05 - 1.2 K/UL    ABS. EOSINOPHILS 0.1 0.0 - 0.4 K/UL    ABS.  BASOPHILS 0.0 0.0 - 0.1 K/UL    DF AUTOMATED     H PYLORI AB, IGA   Result Value Ref Range    H. pylori Ab, IgA <9.0 0.0 - 8.9 units   H PYLORI AB, IGM   Result Value Ref Range    H. pylori Ab, IgM <9.0 0.0 - 8.9 units   IRON   Result Value Ref Range    Iron 33 (L) 50 - 644 ug/dL   METABOLIC PANEL, BASIC   Result Value Ref Range    Sodium 138 136 - 145 mmol/L Potassium 3.4 (L) 3.5 - 5.5 mmol/L    Chloride 103 100 - 108 mmol/L    CO2 29 21 - 32 mmol/L    Anion gap 6 3.0 - 18 mmol/L    Glucose 85 74 - 99 mg/dL    BUN 14 7.0 - 18 MG/DL    Creatinine 0.94 0.6 - 1.3 MG/DL    BUN/Creatinine ratio 15 12 - 20      GFR est AA >60 >60 ml/min/1.73m2    GFR est non-AA >60 >60 ml/min/1.73m2    Calcium 8.7 8.5 - 10.1 MG/DL   VITAMIN B1, WHOLE BLOOD   Result Value Ref Range    Vitamin B1 141.2 66.5 - 200.0 nmol/L   VITAMIN D, 25 HYDROXY   Result Value Ref Range    Vitamin D 25-Hydroxy 42.5 30 - 100 ng/mL   EKG, 12 LEAD, INITIAL   Result Value Ref Range    Ventricular Rate 70 BPM    Atrial Rate 70 BPM    P-R Interval 144 ms    QRS Duration 84 ms    Q-T Interval 386 ms    QTC Calculation (Bezet) 416 ms    Calculated P Axis 36 degrees    Calculated R Axis 36 degrees    Calculated T Axis 20 degrees    Diagnosis       Normal sinus rhythm  Normal ECG  When compared with ECG of 05-APR-2018 16:06,  No significant change was found  Confirmed by Jocelyn Sr MD, ----- ([id number]) on 4/11/2019 5:10:34 PM         Assessment and Plan  Diagnoses and all orders for this visit:    1. BMI 40.0-44.9, adult ([facility]    2. Weight loss counseling, encounter for      Discussed with patient in detail regarding pre-op clearance. I do feel she would be a good candidate for weight loss surgery but I did not perform a pre-op nancy on her today as her surgery is six months out. I will follow. Instructed to continue with lifestyle changes of diet and exercise. After care summary printed and reviewed with patient. Plan reviewed with patient. Questions answered. Patient verbalized understanding of plan and is in agreement with plan. Patient to follow up in five months or earlier if symptoms worsen.      LARRY Burden

## 2019-05-07 NOTE — LETTER
NOTIFICATION RETURN TO WORK / SCHOOL 
 
5/7/2019 9:51 AM 
 
Ms. Leighann Bravo [address] [phone] To Whom It May Concern: 
 
Leighann Bravo is currently under the care of [facility]. She will return to work/school on: 05/07/2019 If there are questions or concerns please have the patient contact our office.  
 
 
 
Sincerely, 
 
 
Carlos Ibarra, NP

## 2019-05-10 ENCOUNTER — DOCUMENTATION ONLY (OUTPATIENT)
Dept: BARIATRICS/WEIGHT MGMT | Age: 44
End: 2019-05-10

## 2019-05-10 ENCOUNTER — HOSPITAL ENCOUNTER (OUTPATIENT)
Dept: BARIATRICS/WEIGHT MGMT | Age: 44
Discharge: HOME | End: 2019-05-10

## 2019-05-10 NOTE — PROGRESS NOTES
[address] [place] [facility], [address]    Patient's Name: Albino August   Age: 39 y.o. YOB: 1975   Sex: female    Date:   5/10/2019    Insurance:              Session: 2 of 6  Revision:     Surgeon:  Dr. Ken Hilario    Height: 4 f 11   Weight:    207      Lbs. BMI: 41.9   Pounds Lost since last month: 0                 Pounds Gained since last month: 1    Starting Weight: 207     Previous Months Weight: 206  Overall Pounds Lost: 0   Overall Pounds Gained: 0    Do you smoke? None    Alcohol intake:  Number of drinks at a time:  None  Number of times a week: None    Class Guidelines    Guidelines are reviewed with patient at the start of every class. 1. Patient understands that weight loss trial classes must be consecutive. Patient understands if they miss a class, it is their responsibility to contact me to reschedule class. I will reach out to patient after their first no show. 2.  Patient understands the expectations that weight maintenance/weight loss is expected during the classes. Failure to demonstrate changes may result in one extra month of weight loss trial, followed by going back to see the surgeon. Patient understands that they CAN NOT gain any weight during the weight loss trial.  Gaining weight will result in extra classes. 3. Patient is also instructed to be doing their labs, blood work, psych visit, support group and any other test that the surgeon has used while they are working on their weight loss trial.  4.  Patient was instructed to bring their blue binder to every class and appointment. Other Pertinent Information:     Changes Made Since Last Class: Made some changes and states she has more energy. Eating Habits and Behaviors      Today in class we talked about the key diet principles.   We start off each class talking about these principles, which include cutting out liquid calories and focusing on water or other non-calorie, non-carbonated drinks. We also spent time talking about carbohydrates, including foods that have carbohydrates and the goal to keep daily carbohydrates under 100 grams per day. Patient was given ideas of meal and snack choices that are lower in carbohydrates and focus more on protein. Patient was encouraged to start trying protein shakes and was given a list of suggestions. The main topic of class today was: Portion Control. We reviewed in class a power point filled with tips on ways to control portions, including using smaller plates, boxing up portions at a restaurant before starting to eat, and not eating from the container, but rather portioning snacks into smaller bags. Patient's were encouraged to food journal, which helps increase awareness of what and how much they are eating. It was emphasized to patient the importance of reading labels and portion sizes, but also applying these portion sizes. Patient was given a list of items that can help to make portion control easier. For example, a deck of cards or a palm of a hand is a proper portion of meat, a fist is a cup or a proper serving of vegetables. Patient was given 10 tips to help with the portion control. Patient's current diet habits include: 3 meals per day and 2 snacks. Patient is eating 3 boiled eggs for breakfast.  Lunch is spinach salad. Dinner is baked pork chops. She is snacking on carrots, Special K bars, fruit, or a meal replacement. She is drinking Crystal Light and ginger ale. Physical Activity/Exercise    Comments:     Currently for exercise, patient is going to the gym for 1 hour, 5 days a week. Patient was given a list of ideas for activity and was encouraged to incorporate 30 minutes a day into their daily routine. Behavior Modification       Comments: In class, we also focused on the behavior aspects of weight management.   This includes being a mindful eater and not eating in front of the TV. Patient is also encouraged to take 20 minutes to eat a meal and eat at a table. One goal for next month includes:  1. Cut back on candy. 2. Drink more water.       Alaina Dilshad, Allie Deshaun [address]  5/10/2019

## 2019-05-30 ENCOUNTER — HOSPITAL ENCOUNTER (OUTPATIENT)
Dept: MAMMOGRAPHY | Age: 44
Discharge: HOME | End: 2019-05-30
Attending: NURSE PRACTITIONER
Payer: MEDICAID

## 2019-05-30 DIAGNOSIS — Z12.39 BREAST CANCER SCREENING: ICD-10-CM

## 2019-05-30 PROCEDURE — 77063 BREAST TOMOSYNTHESIS BI: CPT

## 2019-06-06 RX ORDER — ESTRADIOL 1 MG/1
1 TABLET ORAL DAILY
Qty: 30 TAB | Refills: 10 | Status: SHIPPED | OUTPATIENT
Start: 2019-06-06 | End: 2019-12-13 | Stop reason: SDUPTHER

## 2019-06-07 ENCOUNTER — DOCUMENTATION ONLY (OUTPATIENT)
Dept: BARIATRICS/WEIGHT MGMT | Age: 44
End: 2019-06-07

## 2019-06-07 ENCOUNTER — HOSPITAL ENCOUNTER (OUTPATIENT)
Dept: BARIATRICS/WEIGHT MGMT | Age: 44
Discharge: HOME | End: 2019-06-07

## 2019-06-07 NOTE — PROGRESS NOTES
[address] [facility], [address]    Patient's Name: Ana Crawford   Age: 39 y.o. YOB: 1975   Sex: female    Date:   6/7/2019    Insurance:            Session: 3 of  6  Revision:   Surgeon:  Dr. Alexander Vazquez    Height: 4 f 11 Weight:    200      Lbs. BMI: 40.4   Pounds Lost since last month: 7               Pounds Gained since last month: 0    Starting Weight: 207   Previous Months Weight: 207  Overall Pounds Lost: 7 Overall Pounds Gained: 0      Do you smoke? None    Alcohol intake:  Number of drinks at a time:  None  Number of times a week: None    Class Guidelines    Guidelines are reviewed with patient at the start of every class. 1. Patient understands that weight loss trial classes must be consecutive. Patient understands if they miss a class, it is their responsibility to contact me to reschedule class. I will reach out to patient after their first no show. 2.  Patient understands the expectations that weight maintenance/weight loss is expected during the classes. Failure to demonstrate changes may result in one extra month of weight loss trial, followed by going back to see the surgeon. Patient understands that they CAN NOT gain any weight during the weight loss trial.  Gaining weight will result in extra classes. 3. Patient is also instructed to be doing their labs, blood work, psych visit, support group and any other test that the surgeon has used while they are working on their weight loss trial.  4.  Patient was instructed to bring their blue binder to every class and appointment. Other Pertinent Information:     Changes Made Since Last Class: Cut back on soda and candy. Eating healthier. Eating Habits and Behaviors    Today in class, I reviewed a power point that discussed Nutrition, Behavior, and Exercise changes to start working on.   Some of the eating behaviors that we discussed included the importance of eating breakfast.  We talked about some of the reasons that people don't eat breakfast and food choices that would be appropriate. We also talked about avoiding liquid calories. Patient is encouraged to aim for 64 ounces of fluid per day and trying to get them from water, Crystal Light, sugar free drinks. We also talked about eating out options that are healthier. Patient was encouraged to always request sauces and dressings on the side and request a salad, broth-based soup, or vegetables in place of fries. Patient's current diet habits include: 3 meals per day. Patient is eating more baked or grilled food. She states she is eating a standard size portion, but hopes to reduce this. She is drinking water and Crystal Light and will sometimes have a fruit smoothie. Physical Activity/Exercise    Comments: We talked about exercise. Patient was given reasons of why exercise is so important and how that can help with their long-term success. I have encouraged patient to get a support system to help with the activity. Currently for activity, patient is doing the gym a few times a week and focusing on core exercises. Behavior Modification       Comments: We also talked about behavior modifications. We talked about eating triggers, such as eating in front of the TV and solutions, such as making the TV a no eating zone. If patient is eating out out of emotion, food will only temporarily solve that. Patient is encouraged to HALT and assess if they are eating because they are Hungry, or out of emotions: Anxious, Lonely, Tired, which the food will only temporarily solve. We also talked about ways to prevent relapse. Goals that patient wants to work on includes:  1. Continue going to the gym. 2. Keep working on staying away from candy.     [address], [facility] Deshaun [address]  6/7/2019

## 2019-06-13 ENCOUNTER — HOSPITAL ENCOUNTER (EMERGENCY)
Age: 44
Discharge: HOME | End: 2019-06-13
Attending: EMERGENCY MEDICINE
Payer: MEDICAID

## 2019-06-13 VITALS
OXYGEN SATURATION: 100 % | HEART RATE: 95 BPM | SYSTOLIC BLOOD PRESSURE: 169 MMHG | RESPIRATION RATE: 24 BRPM | DIASTOLIC BLOOD PRESSURE: 97 MMHG

## 2019-06-13 DIAGNOSIS — J98.01 BRONCHOSPASM: ICD-10-CM

## 2019-06-13 DIAGNOSIS — T78.40XA ACUTE ALLERGIC REACTION, INITIAL ENCOUNTER: ICD-10-CM

## 2019-06-13 DIAGNOSIS — J45.41 MODERATE PERSISTENT ASTHMA WITH ACUTE EXACERBATION: Primary | ICD-10-CM

## 2019-06-13 PROCEDURE — 77030029684 HC NEB SM VOL KT MONA -A

## 2019-06-13 PROCEDURE — 74011250636 HC RX REV CODE- 250/636

## 2019-06-13 PROCEDURE — 94640 AIRWAY INHALATION TREATMENT: CPT

## 2019-06-13 PROCEDURE — 99284 EMERGENCY DEPT VISIT MOD MDM: CPT

## 2019-06-13 PROCEDURE — 96365 THER/PROPH/DIAG IV INF INIT: CPT

## 2019-06-13 PROCEDURE — 74011000250 HC RX REV CODE- 250: Performed by: EMERGENCY MEDICINE

## 2019-06-13 PROCEDURE — 74011250637 HC RX REV CODE- 250/637: Performed by: PHYSICIAN ASSISTANT

## 2019-06-13 PROCEDURE — 96375 TX/PRO/DX INJ NEW DRUG ADDON: CPT

## 2019-06-13 RX ORDER — CODEINE PHOSPHATE AND GUAIFENESIN 10; 100 MG/5ML; MG/5ML
5 SOLUTION ORAL
Qty: 120 ML | Refills: 0 | Status: SHIPPED | OUTPATIENT
Start: 2019-06-13 | End: 2019-06-16

## 2019-06-13 RX ORDER — PREDNISONE 10 MG/1
TABLET ORAL
Qty: 21 TAB | Refills: 0 | Status: SHIPPED | OUTPATIENT
Start: 2019-06-13 | End: 2019-07-12

## 2019-06-13 RX ORDER — IPRATROPIUM BROMIDE AND ALBUTEROL SULFATE 2.5; .5 MG/3ML; MG/3ML
3 SOLUTION RESPIRATORY (INHALATION)
Status: COMPLETED | OUTPATIENT
Start: 2019-06-13 | End: 2019-06-13

## 2019-06-13 RX ORDER — ALBUTEROL SULFATE 0.83 MG/ML
2.5 SOLUTION RESPIRATORY (INHALATION)
Status: DISCONTINUED | OUTPATIENT
Start: 2019-06-13 | End: 2019-06-13 | Stop reason: HOSPADM

## 2019-06-13 RX ORDER — CODEINE PHOSPHATE AND GUAIFENESIN 10; 100 MG/5ML; MG/5ML
5 SOLUTION ORAL
Status: COMPLETED | OUTPATIENT
Start: 2019-06-13 | End: 2019-06-13

## 2019-06-13 RX ORDER — MAGNESIUM SULFATE HEPTAHYDRATE 40 MG/ML
2 INJECTION, SOLUTION INTRAVENOUS
Status: COMPLETED | OUTPATIENT
Start: 2019-06-13 | End: 2019-06-13

## 2019-06-13 RX ORDER — MAGNESIUM SULFATE HEPTAHYDRATE 40 MG/ML
2 INJECTION, SOLUTION INTRAVENOUS ONCE
Status: DISCONTINUED | OUTPATIENT
Start: 2019-06-13 | End: 2019-06-13

## 2019-06-13 RX ORDER — MAGNESIUM SULFATE HEPTAHYDRATE 40 MG/ML
INJECTION, SOLUTION INTRAVENOUS
Status: COMPLETED
Start: 2019-06-13 | End: 2019-06-13

## 2019-06-13 RX ADMIN — MAGNESIUM SULFATE HEPTAHYDRATE 2 G: 40 INJECTION, SOLUTION INTRAVENOUS at 14:22

## 2019-06-13 RX ADMIN — IPRATROPIUM BROMIDE AND ALBUTEROL SULFATE 3 ML: .5; 3 SOLUTION RESPIRATORY (INHALATION) at 13:51

## 2019-06-13 RX ADMIN — IPRATROPIUM BROMIDE AND ALBUTEROL SULFATE 3 ML: .5; 3 SOLUTION RESPIRATORY (INHALATION) at 13:40

## 2019-06-13 RX ADMIN — GUAIFENESIN AND CODEINE PHOSPHATE 5 ML: 100; 10 SOLUTION ORAL at 16:42

## 2019-06-13 RX ADMIN — IPRATROPIUM BROMIDE AND ALBUTEROL SULFATE 3 ML: .5; 3 SOLUTION RESPIRATORY (INHALATION) at 13:47

## 2019-06-13 RX ADMIN — METHYLPREDNISOLONE SODIUM SUCCINATE 125 MG: 125 INJECTION, POWDER, FOR SOLUTION INTRAMUSCULAR; INTRAVENOUS at 14:21

## 2019-06-13 RX ADMIN — ALBUTEROL SULFATE 2.5 MG: 2.5 SOLUTION RESPIRATORY (INHALATION) at 14:19

## 2019-06-13 NOTE — ED TRIAGE NOTES
Pt states started with wheezing yest  While at work states was exposed to dog and started wheezing  yest states recovered then however  today  Had exposure to another dog and started wheezing states had a breathing  Treatment PTA with no relief

## 2019-06-13 NOTE — ED NOTES
Pt resting comfortably on stretcher  States she is breathing easier  Magnesium infusion almost completed

## 2019-06-13 NOTE — ED PROVIDER NOTES
EMERGENCY DEPARTMENT HISTORY AND PHYSICAL EXAM    Date: 6/13/2019  Patient Name: Perla Yoder    History of Presenting Illness     Chief Complaint   Patient presents with    Wheezing         History Provided By: Patient    Chief Complaint: wheezing  Duration: 2 Days  Timing:  Worsening  Location: respiratory  Quality: Tightness  Severity: 3 out of 10  Modifying Factors: was exposed to therapy dog at work   Associated Symptoms: cough, wheezing      Additional History (Context): Perla Yoder is a 39 y.o. female with hypertension and asthma who presents with cough wheezing and shortness of breath onset yesterday. 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 Patient was exposed to the dogs at her place of employment. She has severe allergy to pet dander. She has been using inhalers and other asthma medications at home without relief. Today she called her allergist and was directed to the ED. She does have an EpiPen but did not try to using it. She denies any fever, chills or pains or symptoms. PCP: Porfirio Cm NP    Current Facility-Administered Medications   Medication Dose Route Frequency Provider Last Rate Last Dose    albuterol (PROVENTIL VENTOLIN) nebulizer solution 2.5 mg  2.5 mg Nebulization Q15MIN PRN Paul Guaman MD   2.5 mg at 06/13/19 1419     Current Outpatient Medications   Medication Sig Dispense Refill    beclomethasone (QVAR) 80 mcg/actuation aero Take 1 Puff by inhalation two (2) times a day.  guaiFENesin-codeine (ROBITUSSIN AC) 100-10 mg/5 mL solution Take 5 mL by mouth three (3) times daily as needed for Cough for up to 3 days. Max Daily Amount: 15 mL. 120 mL 0    predniSONE (STERAPRED DS) 10 mg dose pack Per pack directions 21 Tab 0    estradiol (ESTRACE) 1 mg tablet Take 1 Tab by mouth daily. 30 Tab 10    hydroCHLOROthiazide (HYDRODIURIL) 50 mg tablet Take 1 Tab by mouth daily.  30 Tab 5    carbinoxamine maleate 4 mg tab TAKE 1 TABLET BY MOUTH EVERY 6 HOURS AS NEEDED FOR 30 DAYS  1    BREO ELLIPTA 200-25 mcg/dose inhaler INHALE 1 PUFF BY MOUTH ONCE A DAY. RINSE AND SPIT AFTER USE FOR 30 DAYS  5    tiotropium bromide (SPIRIVA RESPIMAT) 1.25 mcg/actuation inhaler Take 2 Puffs by inhalation daily.  mometasone (NASONEX) 50 mcg/actuation nasal spray 2 Sprays.  montelukast (SINGULAIR) 10 mg tablet TAKE 1 TABLET BY MOUTH ONCE EVERY EVENING  3    lidocaine (LIDODERM) 5 %       hydrOXYzine pamoate (VISTARIL) 50 mg capsule TAKE 2 CAPSULES BY MOUTH EVERY DAY AT BEDTIME  5    FLOVENT HFA 44 mcg/actuation inhaler TAKE 2 PUFFS BY MOUTH TWICE A DAY  6    albuterol (PROVENTIL VENTOLIN) 2.5 mg /3 mL (0.083 %) nebulizer solution USE 1 VIAL VIA NEBULIZER BY MOUTH EVERY 4 TO 6 HOURS AS NEEDED  6    traZODone (DESYREL) 50 mg tablet TAKE 1-3 TABLETS BY MOUTH AT BEDTIME. 5    EPIPEN 2-SOCORRO 0.3 mg/0.3 mL injection INJECT INTO LATERAL THIGH AS NEEDED FOR ANAPHYLAXIS  1       Past History     Past Medical History:  Past Medical History:   Diagnosis Date    Hypertension     Insomnia        Past Surgical History:  Past Surgical History:   Procedure Laterality Date    HX CERVICAL FUSION      HX HYSTERECTOMY      total    HX OOPHORECTOMY      completion    PLASTIC SURGERY, NECK      VT ANESTH,SURGERY OF SHOULDER         Family History:  Family History   Problem Relation Age of Onset    Hypertension Mother     Hypertension Father     Cancer Father         prostate       Social History:  Social History     Tobacco Use    Smoking status: Never Smoker    Smokeless tobacco: Never Used   Substance Use Topics    Alcohol use: No    Drug use: No       Allergies: Allergies   Allergen Reactions    Dilaudid [Hydromorphone] Hives         Review of Systems   Review of Systems   Constitutional: Negative for chills and fever. Respiratory: Positive for cough, shortness of breath and wheezing.     All other systems reviewed and are negative. All Other Systems Negative  Physical Exam     Vitals:    06/13/19 1600 06/13/19 1615 06/13/19 1630 06/13/19 1643   BP: 137/79 (!) 160/95 (!) 169/97    Pulse: 96 97 98 95   Resp: 26 16 26 24   SpO2: 99% 99% 100% 100%     Physical Exam   Constitutional: She appears well-developed and well-nourished. No distress. HENT:   Head: Normocephalic and atraumatic. Right Ear: External ear normal.   Left Ear: External ear normal.   Nose: Nose normal.   Eyes: Conjunctivae are normal.   Neck: Normal range of motion. Neck supple. Cardiovascular: Normal rate. Pulmonary/Chest: She is in respiratory distress. She has wheezes. Abdominal: Soft. Musculoskeletal: Normal range of motion. Neurological: She is alert. Skin: Skin is warm and dry. She is not diaphoretic. Psychiatric: She has a normal mood and affect. Diagnostic Study Results     Labs -   No results found for this or any previous visit (from the past 12 hour(s)). Radiologic Studies -   No orders to display     CT Results  (Last 48 hours)    None        CXR Results  (Last 48 hours)    None            Medical Decision Making   I am the first provider for this patient. I reviewed the vital signs, available nursing notes, past medical history, past surgical history, family history and social history. Vital Signs-Reviewed the patient's vital signs. Pulse Oximetry Analysis - 100% on ra       Records Reviewed: Nursing Notes    Procedures:  Procedures    Provider Notes (Medical Decision Making): 50-year-old female with asthma and severe allergies to pet dander arrives with bronchospasm and wheezing for 2 days. She was exposed to therapy dogs at her place of employment which caused her to have severe allergic reaction which resulted in bronchospasm and wheezing. She did not use her EpiPen but did call her allergist who directed her to the ER.     After 3 DuoNeb's and albuterol nebulizers, Solu-Medrol, and magnesium patient is feeling much better. She says she is able to go home and will follow up with her PCP. Patient was advised if her symptoms return she is to give herself the EpiPen and call 911. Patient agrees with plan. JOSE Kim 5:06 PM        MED RECONCILIATION:  Current Facility-Administered Medications   Medication Dose Route Frequency    albuterol (PROVENTIL VENTOLIN) nebulizer solution 2.5 mg  2.5 mg Nebulization Q15MIN PRN     Current Outpatient Medications   Medication Sig    beclomethasone (QVAR) 80 mcg/actuation aero Take 1 Puff by inhalation two (2) times a day.  guaiFENesin-codeine (ROBITUSSIN AC) 100-10 mg/5 mL solution Take 5 mL by mouth three (3) times daily as needed for Cough for up to 3 days. Max Daily Amount: 15 mL.  predniSONE (STERAPRED DS) 10 mg dose pack Per pack directions    estradiol (ESTRACE) 1 mg tablet Take 1 Tab by mouth daily.  hydroCHLOROthiazide (HYDRODIURIL) 50 mg tablet Take 1 Tab by mouth daily.  carbinoxamine maleate 4 mg tab TAKE 1 TABLET BY MOUTH EVERY 6 HOURS AS NEEDED FOR 30 DAYS    BREO ELLIPTA 200-25 mcg/dose inhaler INHALE 1 PUFF BY MOUTH ONCE A DAY. RINSE AND SPIT AFTER USE FOR 30 DAYS    tiotropium bromide (SPIRIVA RESPIMAT) 1.25 mcg/actuation inhaler Take 2 Puffs by inhalation daily.  mometasone (NASONEX) 50 mcg/actuation nasal spray 2 Sprays.  montelukast (SINGULAIR) 10 mg tablet TAKE 1 TABLET BY MOUTH ONCE EVERY EVENING    lidocaine (LIDODERM) 5 %     hydrOXYzine pamoate (VISTARIL) 50 mg capsule TAKE 2 CAPSULES BY MOUTH EVERY DAY AT BEDTIME    FLOVENT HFA 44 mcg/actuation inhaler TAKE 2 PUFFS BY MOUTH TWICE A DAY    albuterol (PROVENTIL VENTOLIN) 2.5 mg /3 mL (0.083 %) nebulizer solution USE 1 VIAL VIA NEBULIZER BY MOUTH EVERY 4 TO 6 HOURS AS NEEDED    traZODone (DESYREL) 50 mg tablet TAKE 1-3 TABLETS BY MOUTH AT BEDTIME.     EPIPEN 2-SOCORRO 0.3 mg/0.3 mL injection INJECT INTO LATERAL THIGH AS NEEDED FOR ANAPHYLAXIS       Disposition:  home    DISCHARGE NOTE:     Pt has been reexamined. Patient has no new complaints, changes, or physical findings. Care plan outlined and precautions discussed. All medications were reviewed with the patient; will d/c home with pred and robitussin AC. All of pt's questions and concerns were addressed. Patient was instructed and agrees to follow up with pcp, as well as to return to the ED upon further deterioration. Patient is ready to go home. Follow-up Information     Follow up With Specialties Details Why Contact Info    Stephania Marinelli NP Nurse Practitioner   [address]  [phone] [address] [facility] EMERGENCY DEPT Emergency Medicine  If symptoms worsen [address] [facility]  [phone]          Discharge Medication List as of 6/13/2019  4:19 PM      START taking these medications    Details   guaiFENesin-codeine (ROBITUSSIN AC) 100-10 mg/5 mL solution Take 5 mL by mouth three (3) times daily as needed for Cough for up to 3 days. Max Daily Amount: 15 mL. , Print, Disp-120 mL, R-0      predniSONE (STERAPRED DS) 10 mg dose pack Per pack directions, Normal, Disp-21 Tab, R-0         CONTINUE these medications which have NOT CHANGED    Details   beclomethasone (QVAR) 80 mcg/actuation aero Take 1 Puff by inhalation two (2) times a day., Historical Med      estradiol (ESTRACE) 1 mg tablet Take 1 Tab by mouth daily. , Normal, Disp-30 Tab, R-10      hydroCHLOROthiazide (HYDRODIURIL) 50 mg tablet Take 1 Tab by mouth daily. , Normal, Disp-30 Tab, R-5      carbinoxamine maleate 4 mg tab TAKE 1 TABLET BY MOUTH EVERY 6 HOURS AS NEEDED FOR 30 DAYS, Historical Med, R-1      BREO ELLIPTA 200-25 mcg/dose inhaler INHALE 1 PUFF BY MOUTH ONCE A DAY. RINSE AND SPIT AFTER USE FOR 30 DAYS, Historical Med, R-5, JEANETTE      tiotropium bromide (SPIRIVA RESPIMAT) 1.25 mcg/actuation inhaler Take 2 Puffs by inhalation daily. , Historical Med      mometasone (NASONEX) 50 mcg/actuation nasal spray 2 Sprays. , Historical Med      montelukast (SINGULAIR) 10 mg tablet TAKE 1 TABLET BY MOUTH ONCE EVERY EVENING, Historical Med, R-3      lidocaine (LIDODERM) 5 % Historical Med      hydrOXYzine pamoate (VISTARIL) 50 mg capsule TAKE 2 CAPSULES BY MOUTH EVERY DAY AT BEDTIME, Historical Med, R-5      FLOVENT HFA 44 mcg/actuation inhaler TAKE 2 PUFFS BY MOUTH TWICE A DAY, Historical Med, R-6, JEANETTE      albuterol (PROVENTIL VENTOLIN) 2.5 mg /3 mL (0.083 %) nebulizer solution USE 1 VIAL VIA NEBULIZER BY MOUTH EVERY 4 TO 6 HOURS AS NEEDED, Historical Med, R-6      traZODone (DESYREL) 50 mg tablet TAKE 1-3 TABLETS BY MOUTH AT BEDTIME., Historical Med, R-5      EPIPEN 2-SOCORRO 0.3 mg/0.3 mL injection INJECT INTO LATERAL THIGH AS NEEDED FOR ANAPHYLAXIS, Historical Med, R-1, JEANETTE               Diagnosis     Clinical Impression:   1. Moderate persistent asthma with acute exacerbation    2. Bronchospasm    3.  Acute allergic reaction, initial encounter

## 2019-06-13 NOTE — ED NOTES
Wheezing has decreased.  Slight wheezing bilateral  Solu-medrol and magnesium administered per orders

## 2019-06-13 NOTE — ED NOTES
Breathing tx started by charge RN  Pt wheezing in all lung fields  Started when coming in contact with a dog  Feels a little better after second nebulizer

## 2019-06-13 NOTE — LETTER
[address] [place] [address] [facility] EMERGENCY DEPT 
[address] [facility] [address] 
[phone] Work/School Note Date: 6/13/2019 To Whom It May concern: 
 
Alina Meeks was seen and treated today in the emergency room by the following provider(s): 
Attending Provider: Gómez Garcia MD 
Physician Assistant: Bud Mera. Alina Meeks may be excused from work on 6/14/19. Sincerely, JOSE Galloway

## 2019-06-13 NOTE — ED NOTES
Reviewed discharge instructions with patient.   All questions answered  Arm band removed and shredded  Pt feeling better

## 2019-06-13 NOTE — DISCHARGE INSTRUCTIONS
Patient Education        Wheezing or Bronchoconstriction: Care Instructions  Your Care Instructions  Wheezing is a whistling noise made during breathing. It occurs when the small airways, or bronchial tubes, that lead to your lungs swell or contract (spasm) and become narrow. This narrowing is called bronchoconstriction. When your airways constrict, it is hard for air to pass through and this makes it hard for you to breathe. Wheezing and bronchoconstriction can be caused by many problems, including:  · An infection such as the flu or a cold. · Allergies such as hay fever. · Diseases such as asthma or chronic obstructive pulmonary disease. · Smoking. Treatment for your wheezing depends on what is causing the problem. Your wheezing may get better without treatment. But you may need to pay attention to things that cause your wheezing and avoid them. Or you may need medicine to help treat the wheezing and to reduce the swelling or to relieve spasms in your lungs. Follow-up care is a key part of your treatment and safety. Be sure to make and go to all appointments, and call your doctor if you are having problems. It is also a good idea to know your test results and keep a list of the medicines you take. How can you care for yourself at home? · Take your medicine exactly as prescribed. Call your doctor if you think you are having a problem with your medicine. You will get more details on the specific medicine your doctor prescribes. · If your doctor prescribed antibiotics, take them as directed. Do not stop taking them just because you feel better. You need to take the full course of antibiotics. · Breathe moist air from a humidifier, hot shower, or sink filled with hot water. This may help ease your symptoms and make it easier for you to breathe. · If you have congestion in your nose and throat, drinking plenty of fluids, especially hot fluids, may help relieve your symptoms.  If you have kidney, heart, or liver disease and have to limit fluids, talk with your doctor before you increase the amount of fluids you drink. · If you have mucus in your airways, it may help to breathe deeply and cough. · Do not smoke or allow others to smoke around you. Smoking can make your wheezing worse. If you need help quitting, talk to your doctor about stop-smoking programs and medicines. These can increase your chances of quitting for good. · Avoid things that may cause your wheezing. These may include colds, smoke, air pollution, dust, pollen, pets, cockroaches, stress, and cold air. When should you call for help? Call 911 anytime you think you may need emergency care. For example, call if:    · You have severe trouble breathing.     · You passed out (lost consciousness).    Call your doctor now or seek immediate medical care if:    · You cough up yellow, dark brown, or bloody mucus (sputum).     · You have new or worse shortness of breath.     · Your wheezing is not getting better or it gets worse after you start taking your medicine.    Watch closely for changes in your health, and be sure to contact your doctor if:    · You do not get better as expected. Where can you learn more? Go to http://bashir-princess.info/. Enter 454 [id number] in the search box to learn more about \"Wheezing or Bronchoconstriction: Care Instructions. \"  Current as of: September 5, 2018  Content Version: 11.9  © 2089-5064 [employer]. Care instructions adapted under license by [employer] (which disclaims liability or warranty for this information). If you have questions about a medical condition or this instruction, always ask your healthcare professional. [address] any warranty or liability for your use of this information.

## 2019-07-12 ENCOUNTER — DOCUMENTATION ONLY (OUTPATIENT)
Dept: BARIATRICS/WEIGHT MGMT | Age: 44
End: 2019-07-12

## 2019-07-12 ENCOUNTER — OFFICE VISIT (OUTPATIENT)
Dept: SURGERY | Age: 44
End: 2019-07-12

## 2019-07-12 ENCOUNTER — HOSPITAL ENCOUNTER (OUTPATIENT)
Dept: BARIATRICS/WEIGHT MGMT | Age: 44
Discharge: HOME | End: 2019-07-12

## 2019-07-12 VITALS
DIASTOLIC BLOOD PRESSURE: 80 MMHG | TEMPERATURE: 98.4 F | HEIGHT: 59 IN | RESPIRATION RATE: 16 BRPM | OXYGEN SATURATION: 98 % | HEART RATE: 77 BPM | SYSTOLIC BLOOD PRESSURE: 130 MMHG | BODY MASS INDEX: 39.31 KG/M2 | WEIGHT: 195 LBS

## 2019-07-12 DIAGNOSIS — E66.09 CLASS 2 OBESITY DUE TO EXCESS CALORIES WITHOUT SERIOUS COMORBIDITY WITH BODY MASS INDEX (BMI) OF 39.0 TO 39.9 IN ADULT: Primary | ICD-10-CM

## 2019-07-12 DIAGNOSIS — J45.41 MODERATE PERSISTENT ASTHMA WITH ACUTE EXACERBATION: ICD-10-CM

## 2019-07-12 NOTE — PROGRESS NOTES
[facility] [address] [facility], [address]    Patient's Name: Deepti Mercado   Age: 38 y.o. YOB: 1975   Sex: female    Date:   7/12/2019    Insurance:              Session: 4 of 6  Revision:     Surgeon:  Dr. Stu Baron    Height: 4 f 11    Weight:    195      Lbs. BMI: 39.4   Pounds Lost since last month: 5                 Pounds Gained since last month: 0    Starting Weight: 207     Previous Months Weight: 200  Overall Pounds Lost: 12   Overall Pounds Gained: 0      Do you smoke? None    Alcohol intake:  Number of drinks at a time:  NOne  Number of times a week: None    Class Guidelines    Guidelines are reviewed with patient at the start of every class. 1. Patient understands that weight loss trial classes must be consecutive. Patient understands if they miss a class, it is their responsibility to contact me to reschedule class. I will reach out to patient after their first no show. 2.  Patient understands the expectations that weight maintenance/weight loss is expected during the classes. Failure to demonstrate changes may result in one extra month of weight loss trial, followed by going back to see the surgeon. Patient understands that they CAN NOT gain any weight during the weight loss trial.  Gaining weight will result in extra classes. 3. Patient is also instructed to be doing their labs, blood work, psych visit, support group and any other test that the surgeon has used while they are working on their weight loss trial.  4.  Patient was instructed to bring their blue binder to every class and appointment. Other Pertinent Information:     Changes Made Since Last Class: Cut back on carbohydrates and junk food. No pasta. Eating Habits and Behaviors    Today in class, we started talking about the key diet principles. We first focused on stopping liquid calories.   Patient was also educated on carbohydrates. Patient was instructed to start cutting out bread, rice, and pasta from the diet and start focusing more on meat and vegetables. I then gave a power point, which focused on Label Reading. In class, I gave patients a labels and we worked through a series of questions to help patients have a better understanding of label reading. Patient was instructed to review the serving size. Patient was encouraged to focus on protein and carbohydrates. We also did a few label reading activities to help the patient become more familiar with label reading. Patient's current diet habits include: 5 meals per day. Patient is doing boiled eggs and green tea for breakfast.  Lunch is salad, fish, seafood. Dinner is a [employer] Meal.  She is snacking on carrots, raspberries, yogurt, or blueberries. She states she does not eat out. She is drinking 4 ounces of soda, but states the rest is water. Physical Activity/Exercise    Comments: We talked about exercise. Patient was given reasons of why exercise is so important and how that can help with their long-term success. I have encouraged patient to get a support system to help with the activity. Currently for activity, patient is doing daily core exercises. Behavior Modification       Comments:  Behavior modifications were reinforced. This included not eating in front of the TV, which could lead to bigger portions and eating when one is not hungry. We also talked about the importance of eating 3 meals per day. Patient was encouraged to food journal to keep their daily carbohydrates less than 30 grams per meal.      Goals that patient wants to work on includes:  1. Cut soda completely out next month. 2. Walking daily.       Allie Lagos 87 RD  7/12/2019

## 2019-07-12 NOTE — PROGRESS NOTES
ROOM # 5    Yuliana Fox presents today for   Chief Complaint   Patient presents with    Follow-up         Visit Vitals  /80 (BP 1 Location: Left arm, BP Patient Position: Sitting)   Pulse 77   Temp 98.4 °F (36.9 °C) (Oral)   Resp 16   Ht 4' 11\" (1.499 m)   Wt 195 lb (88.5 kg)   SpO2 98%   BMI 39.39 kg/m²         Advance Directive:  1. Do you have an advance directive in place? Patient Reply: No    2. If not, would you like material regarding how to put one in place? Patient Reply: NO    Coordination of Care:  1. Have you been to the ER, urgent care clinic since your last visit? Hospitalized since your last visit?  No

## 2019-07-12 NOTE — PROGRESS NOTES
Bariatric Preoperative Progress Note    Subjective:     Maricruz Montenegro is a 38 y.o. female who presents today for followup of their candidacy for bariatric surgery. Since last seen, Maricruz Montenegro has been working through bariatric program towards LGBP. Past Medical History:   Diagnosis Date    Hypertension     Insomnia        Past Surgical History:   Procedure Laterality Date    HX CERVICAL FUSION      HX HYSTERECTOMY      total    HX OOPHORECTOMY      completion    PLASTIC SURGERY, NECK      HI ANESTH,SURGERY OF SHOULDER         Current Outpatient Medications   Medication Sig Dispense Refill    beclomethasone (QVAR) 80 mcg/actuation aero Take 1 Puff by inhalation two (2) times a day.  estradiol (ESTRACE) 1 mg tablet Take 1 Tab by mouth daily. 30 Tab 10    hydroCHLOROthiazide (HYDRODIURIL) 50 mg tablet Take 1 Tab by mouth daily. 30 Tab 5    EPIPEN 2-SOCORRO 0.3 mg/0.3 mL injection INJECT INTO LATERAL THIGH AS NEEDED FOR ANAPHYLAXIS  1    BREO ELLIPTA 200-25 mcg/dose inhaler INHALE 1 PUFF BY MOUTH ONCE A DAY. RINSE AND SPIT AFTER USE FOR 30 DAYS  5    tiotropium bromide (SPIRIVA RESPIMAT) 1.25 mcg/actuation inhaler Take 2 Puffs by inhalation daily.  mometasone (NASONEX) 50 mcg/actuation nasal spray 2 Sprays.  montelukast (SINGULAIR) 10 mg tablet TAKE 1 TABLET BY MOUTH ONCE EVERY EVENING  3    hydrOXYzine pamoate (VISTARIL) 50 mg capsule TAKE 2 CAPSULES BY MOUTH EVERY DAY AT BEDTIME  5    FLOVENT HFA 44 mcg/actuation inhaler TAKE 2 PUFFS BY MOUTH TWICE A DAY  6    albuterol (PROVENTIL VENTOLIN) 2.5 mg /3 mL (0.083 %) nebulizer solution USE 1 VIAL VIA NEBULIZER BY MOUTH EVERY 4 TO 6 HOURS AS NEEDED  6    traZODone (DESYREL) 50 mg tablet TAKE 1-3 TABLETS BY MOUTH AT BEDTIME.   5    predniSONE (STERAPRED DS) 10 mg dose pack Per pack directions 21 Tab 0    carbinoxamine maleate 4 mg tab TAKE 1 TABLET BY MOUTH EVERY 6 HOURS AS NEEDED FOR 30 DAYS  1    lidocaine (LIDODERM) 5 % Allergies   Allergen Reactions    Dilaudid [Hydromorphone] Hives       ROS:  Review of Systems:  Positives in [place]    Constitutional:  Unexpected weight gain/loss, night sweats,fatigue/maliase/lethargy, change in sleep, fever, rash  Eyes:  Visual changes, eye pain, floaters  ENT:  Rhinorrhea, epistaxis, sinus pain, change in hearing, gingival bleeding, sore throat, dysphagia, dysphonia  CV:  Chest pain, shortness of breath, difficulty breathing, orthopnea, palpitations, loss of consciousness, claudication  Resp: Cough, wheeze, haemoptysis, sob, exercise intolerence  GI:  Abdominal pain, dysphagia, reflux, bloating, cramping. Obstipation, haematemesis, brbpr, hematochezia,dark tarry stools. Nausea, vomitting, diarrhea, constipation. : Change in frequency of urination, dysuria, hematuria, change in force of Stream  Neuro: Paresthesias, numbness, weakness, changes in balance, changes in speech, loss of control of bowel or bladder, headaches. Psych:Changes in depression, anxiety, sleep patterns, change in energy Levels  Endocrine: Temperature intolerance, dry skin, hair loss, fatigue,  Episodes of hypoglycemia, changes in libido  Heme: Anemia, pupura, petechia  Skin: Rashes, itchiness, excessive bruising  Musculoskeletal: weakness, arthropathy    Remainder of ROS as per HPI. Objective:     Physical Exam:  Visit Vitals  /80 (BP 1 Location: Left arm, BP Patient Position: Sitting)   Pulse 77   Temp 98.4 °F (36.9 °C) (Oral)   Resp 16   Ht 4' 11\" (1.499 m)   Wt 88.5 kg (195 lb)   LMP  (LMP Unknown)   SpO2 98%   BMI 39.39 kg/m²         General: AAOX3, pleasant and cooperative to exam. Appropriately groomed. NAD. Non-toxic in appearance. Appears stated age. HENT: NC/AT. PERRLA. Extraocular motions are intact. Sclera anicteric, Conjunctiva Clear. Nares clear. Oropharynx pink, moist without exudate or erythema. Neck:  Supple, trachea is midline. No JVD, Lymphadenopathy. No bruits.   Chest: Good equal bilateral expansion  Lungs: Clear to auscultation bilaterally without e/o crackles, wheezes or rhales. Heart: RRR, S1 and S2 noted. No c/r/m/g. Abdomen: obese, soft and non-tender without distension. Good bowel sounds. No vis/palp masses or pulsations. No organo-splenomegaly. No hernias to my exam.  Pelvis: Stable. :  Deferred  Rectal: Deferred  Extremities: Positive pulses in all 4 extremities. Baseline range of motion in all 4 extremities. Strength, sensation and reflexes intact, appropriate and equal in b/l upper and lower extremities. No C/C/E  Neuro: CN II-XII grossly intact without focal deficit. Ambulatory. Skin: Clean, warm and dry. Studies to date:    Labs: significant for iron deficiency    EKG: NSR     Nutritional evaluation: 4/6 completed     Psychiatric evaluation: approved for bariatric surgery     Support Group: attended     Additional evaluations: PCP clearance- has appt scheduled September 13th         Assessment:   Brigid Geronimo is a 38 y.o. female who is progressing through the bariatric preoperative evaluation. At this time, they are an appropriate candidate for weight loss surgery. Ms. Jerald Bansal has a reminder for a \"due or due soon\" health maintenance. I have asked that she contact her primary care provider for follow-up on this health maintenance. Plan:   -complete remainder of preop evaluation including nutrition classes and PCP clearance.  -Patient voices understanding that weight gain during weight loss trial may result in cancellation of weight loss surgery.   -Follow up once has completed entirety of weight loss workup to determine next steps. - F/u end of September for pre-op with Dr. Rodri Lebron.       PIETER Gomez-BC

## 2019-08-07 ENCOUNTER — TELEPHONE (OUTPATIENT)
Dept: SURGERY | Age: 44
End: 2019-08-07

## 2019-08-07 NOTE — TELEPHONE ENCOUNTER
Patient called to let me know that she went in for rectification and only has plan first insurance. She purchased [employer] insurance and that does not have the benefit for weight loss surgery. Wilda stated she is going to try and re apply, but until them she is on class 4 of 6. Told patient to continue to go to classes because if she stops she will have to start over and she will get in touch with her worker to see what she can do to get her insurance changed back to regular medicaid.

## 2019-08-09 ENCOUNTER — DOCUMENTATION ONLY (OUTPATIENT)
Dept: BARIATRICS/WEIGHT MGMT | Age: 44
End: 2019-08-09

## 2019-08-09 ENCOUNTER — HOSPITAL ENCOUNTER (OUTPATIENT)
Dept: BARIATRICS/WEIGHT MGMT | Age: 44
Discharge: HOME | End: 2019-08-09

## 2019-08-09 NOTE — PROGRESS NOTES
[address] [place] [address] [facility], [address]    Patient's Name: Ken Zurita   Age: 38 y.o. YOB: 1975   Sex: female    Date:   8/9/2019    Insurance:            Session: 5 of 6  Revision:   Surgeon:  Dr. Angelita Rodriguez    Height: 4 f 11  Weight:    191      Lbs. BMI: 38.8   Pounds Lost since last month: 4               Pounds Gained since last month: 0      Starting Weight: 207   Previous Months Weight: 195  Overall Pounds Lost: 16 Overall Pounds Gained: 0      Do you smoke? None    Alcohol intake:  Number of drinks at a time:  None  Number of times a week: None    Class Guidelines    Guidelines are reviewed with patient at the start of every class. 1. Patient understands that weight loss trial classes must be consecutive. Patient understands if they miss a class, it is their responsibility to contact me to reschedule class. I will reach out to patient after their first no show. 2.  Patient understands the expectations that weight maintenance/weight loss is expected during the classes. Failure to demonstrate changes may result in one extra month of weight loss trial, followed by going back to see the surgeon. Patient understands that they CAN NOT gain any weight during the weight loss trial.  Gaining weight will result in extra classes. 3. Patient is also instructed to be doing their labs, blood work, psych visit, support group and any other test that the surgeon has used while they are working on their weight loss trial.  4.  Patient was instructed to bring their blue binder to every class and appointment. Other Pertinent Information:     Changes Made Since Last Class: Eating beater. Less stress    Eating Habits and Behaviors    Today in class, we reviewed the key diet principles. I have talked to patient about pushing the fluid and working towards 64 ounces per day. We focused on following a low-calorie diet. Patient was instructed to count their carbohydrates and try to keep their daily intake under 100 grams per day and try to keep their daily protein at 80 grams per day. Patient was given examples of carbohydrates in starches. Patient was encouraged to focus on meat and vegetables and begin cutting carbohydrates out. We talked about foods that are protein-based and how to incorporate those into their meals. I also reviewed with patient the importance of eating 3 meals per day and suggestions were made for breakfast items. Patient's current diet habits include: 5 meals per day. Patient is eating primarily meat and vegetables. She is snacking on yogurt and carrots. She states she has stopped eating out. She is drinking 64 ounces fluid per day. Physical Activity/Exercise    Comments: We talked about exercise. Patient was given reasons of why exercise is so important and how that can help with their long-term success. I have encouraged patient to get a support system to help with the activity. Currently for activity, patient is doing the gym for 1 hour, 5 days a week. Behavior Modification       Comments:  During today's lesson, I gave a presentation called The 100-200 Calorie \"Mindless Margin. \"  The goal is to make modest daily 100-200 calorie reductions in certain things that the body won't notice. One, 100-200 calorie change and would will look 10-20 pounds in one year. An example could be cutting soda. Patient was given a check off list and was encouraged to come up with 1-3 100 calories changes they could make. The check off list is a daily tracker to see if these goals are being met. Goals that patient wants to work on includes:  1. Lose the weight. 2. Stay positive.        Nomi Alcaraz, MS RD  8/9/2019

## 2019-09-10 ENCOUNTER — HOSPITAL ENCOUNTER (OUTPATIENT)
Dept: BARIATRICS/WEIGHT MGMT | Age: 44
Discharge: HOME | End: 2019-09-10

## 2019-09-11 ENCOUNTER — DOCUMENTATION ONLY (OUTPATIENT)
Dept: BARIATRICS/WEIGHT MGMT | Age: 44
End: 2019-09-11

## 2019-09-11 NOTE — PROGRESS NOTES
[address] [place] [address] [facility], [address]    Patient's Name: Mackenzie Sims   Age: 38 y.o. YOB: 1975   Sex: female    Date:   9/11/2019    Insurance:            Session: 6 of 6  Revision:   Surgeon:  Dr. Leslie Thorne    Height: 4 f 11 Weight:    190      Lbs. BMI: 38.5   Pounds Lost since last month: 1               Pounds Gained since last month: 0    Starting Weight: 207   Previous Months Weight: 191  Overall Pounds Lost: 17 Overall Pounds Gained: 0      Do you smoke? None    Alcohol intake:  Number of drinks at a time:  None  Number of times a week: None    Class Guidelines    Guidelines are reviewed with patient at the start of every class. 1. Patient understands that weight loss trial classes must be consecutive. Patient understands if they miss a class, it is their responsibility to contact me to reschedule class. I will reach out to patient after their first no show. 2.  Patient understands the expectations that weight maintenance/weight loss is expected during the classes. Failure to demonstrate changes may result in one extra month of weight loss trial, followed by going back to see the surgeon. 3. Patient is also instructed to be doing their labs, blood work, psych visit, support group and any other test that the surgeon has used while they are working on their weight loss trial.    Other Pertinent Information:     Changes Made Since Last Class: Cut back on fried foods, soda, pasta, and potatoes. Eating Habits and Behaviors      Today we reviewed key diet principles. We talked about ways that patient can follow a low calorie diet. These included: Stopping all liquid calories and patient was given a list of fluid choices that would be appropriate. We talked about carbohydrates.   Patient was educated that all carbohydrates turn to sugar and was encouraged to stick to the complex carbohydrates while in weight loss trials, but aim to keep less than 100 grams during weight loss trials. Patient was given a list of foods to not eat and drink due to high sugar, high fat, or high carbohydrate content. Patient was also given a list of foods and drinks that are high protein, low carbohydrate, and would be appropriate to eat. Patient was given a list of fruit and what a portion size is and how many carbohydrates it contains. Patient was encouraged to keep fruit intake to a minimum. Patient was also given a list of 50 low carbohydrate snack options, but was also cautioned that some of the choices still were high in calories and portion control should be practiced. Patient's current diet habits include: 3 meals per day. She is eating 2 eggs and orange juice for breakfast.  Lunch is baked chicken or salad. Dinner is salad with fresh fruit. She is snacking on celery or carrots. She states she does not eat fast food. She is drinking 6-16.9 ounce bottles of water. Physical Activity/Exercise    Comments:     Currently for exercise, patient is walking for 1 hour, 5 days a week. We talked about activities for patient to do, including walking, swimming, or chair exercises. Behavior Modification       Comments:   Patient was encouraged to food journal. I talked with patient about tracking their daily carbohydrate. One helpful larry is My Fitness Pal.  Patient was also encouraged to track their daily fluid intake. Discussed with patient the larry, Water Logged, that can be helpful in tracking their fluid intake. We also talked about the importance of planning ahead. Lack of willpower is often a lack of planning.      Carlos Manuel Hernandez, MS RD  9/11/2019

## 2019-09-11 NOTE — PROGRESS NOTES
Nutrition Evaluation    Patient's Name: Saumya Taylor   Age: 38 y.o. YOB: 1975   Sex: female    Height: 4 f 11 Weight: 190 BMI:  38.5  Starting Weight:  207        Smoking Status:  None  Alcohol Intake:  Number of Drinks at a Time: None  Number of Times a Week: None    Changes made during classes include:   Cut back on soda. Fried food  [employer] potatoes        Two things that patient learned during this weight loss trial:  You have to have the mindset    Watch what I eat. Summary:  I feel that Saumya Taylor has demonstrated appropriate diet changes and is ready to move forward with surgery. Patient has been briefed on the importance of the protein drinks, vitamins, and the transition of the diet stages. Patient understands that the long-term diet will focus on protein and vegetables. Patient understand the effects of carbohydrates after surgery and what reactive hypoglycemia is. Patient is aware that they will be attending pre-op class 2 weeks before surgery and will get more detailed information on the post-op diet guidelines. Patient will see me again at 6 weeks post-op. At this 6 week visit, RD will assess how patient is tolerating soft protein and advance to vegetables, if tolerating soft protein without difficulty. Patient will also see RD again at 9 months post-op. This visit will assess patient's compliance with current protocol, including diet, vitamins, protein shakes, and exercise. Post-op diet guidelines will be reinforced. RD is available for questions and to meet with patient outside of the 6 week and 9 month post-op visit. We spent a lot of time talking about the vitamins. Patient understands the importance of being compliant with the diet protocol and the complications and risks that can occur if they are non-compliant with the nutritional protocol. Patient has attended at least one support group.     Candidate for surgery: Yes  Re-evaluation Date:     Procedure:  Gastric Bypass       Allie Hurtado Deshaun 87 RD  9/11/2019

## 2019-09-17 ENCOUNTER — OFFICE VISIT (OUTPATIENT)
Dept: FAMILY MEDICINE CLINIC | Age: 44
End: 2019-09-17

## 2019-09-17 VITALS
BODY MASS INDEX: 39.35 KG/M2 | HEIGHT: 59 IN | WEIGHT: 195.2 LBS | DIASTOLIC BLOOD PRESSURE: 81 MMHG | HEART RATE: 63 BPM | RESPIRATION RATE: 18 BRPM | SYSTOLIC BLOOD PRESSURE: 137 MMHG | TEMPERATURE: 98.4 F

## 2019-09-17 RX ORDER — DUPILUMAB 300 MG/2ML
INJECTION, SOLUTION SUBCUTANEOUS
COMMUNITY
Start: 2019-07-11

## 2019-09-17 RX ORDER — CARBINOXAMINE MALEATE 4 MG/1
TABLET ORAL
COMMUNITY
Start: 2019-09-06

## 2019-09-17 NOTE — PROGRESS NOTES
Nolanrigoberto Shahriar presents today for   Chief Complaint   Patient presents with    Pre-op Exam       Sunitha Bess preferred language for health care discussion is english/other. Is someone accompanying this pt? no    Is the patient using any DME equipment during [address]? no    Depression Screening:  3 most recent PHQ Screens 1/14/2019   PHQ Not Done -   Little interest or pleasure in doing things Not at all   Feeling down, depressed, irritable, or hopeless Not at all   Total Score PHQ 2 0       Learning Assessment:  Learning Assessment 4/11/2019   PRIMARY LEARNER Patient   HIGHEST LEVEL OF EDUCATION - PRIMARY LEARNER  -   BARRIERS PRIMARY LEARNER [address] CAREGIVER -   PRIMARY LANGUAGE ENGLISH    NEED -   LEARNER PREFERENCE PRIMARY DEMONSTRATION   ANSWERED BY self   RELATIONSHIP SELF       Abuse Screening:  Abuse Screening Questionnaire 1/14/2019   Do you ever feel afraid of your partner? N   Are you in a relationship with someone who physically or mentally threatens you? N   Is it safe for you to go home? Y       Generalized Anxiety  No flowsheet data found. Health Maintenance Due   Topic Date Due    Pneumococcal 0-64 years (1 of 1 - PPSV23) 06/15/1980    DTaP/Tdap/Td series (1 - Tdap) 06/15/1995    Influenza Age 5 to Adult  08/01/2019   . Health Maintenance reviewed and discussed and ordered per Provider. Sunitha Bess is updated on all     Coordination of Care:  1. Have you been to the ER, urgent care clinic since your last visit? Hospitalized since your last visit? no    2. Have you seen or consulted any other health care providers outside of the [address] [place] [address] since your last visit? Include any pap smears or colon screening. no      Advance Directive:  1. Do you have an advance directive in place? Patient Reply:no    2. If not, would you like material regarding how to put one in place?  Patient Reply: no

## 2019-10-17 ENCOUNTER — OFFICE VISIT (OUTPATIENT)
Dept: FAMILY MEDICINE CLINIC | Age: 44
End: 2019-10-17

## 2019-10-17 VITALS
SYSTOLIC BLOOD PRESSURE: 104 MMHG | OXYGEN SATURATION: 97 % | DIASTOLIC BLOOD PRESSURE: 68 MMHG | HEIGHT: 59 IN | HEART RATE: 61 BPM | TEMPERATURE: 97.1 F | BODY MASS INDEX: 38.34 KG/M2 | RESPIRATION RATE: 20 BRPM | WEIGHT: 190.2 LBS

## 2019-10-17 DIAGNOSIS — E55.9 VITAMIN D DEFICIENCY: ICD-10-CM

## 2019-10-17 DIAGNOSIS — M54.50 ACUTE LEFT-SIDED LOW BACK PAIN WITHOUT SCIATICA: ICD-10-CM

## 2019-10-17 DIAGNOSIS — R10.31 RIGHT GROIN PAIN: ICD-10-CM

## 2019-10-17 DIAGNOSIS — R73.03 PREDIABETES: ICD-10-CM

## 2019-10-17 DIAGNOSIS — M54.50 ACUTE LEFT-SIDED LOW BACK PAIN WITHOUT SCIATICA: Primary | ICD-10-CM

## 2019-10-17 DIAGNOSIS — K92.1 BLOOD IN STOOL: ICD-10-CM

## 2019-10-17 DIAGNOSIS — Z23 ENCOUNTER FOR IMMUNIZATION: ICD-10-CM

## 2019-10-17 RX ORDER — CYCLOBENZAPRINE HCL 5 MG
5 TABLET ORAL
Qty: 15 TAB | Refills: 0 | Status: SHIPPED | OUTPATIENT
Start: 2019-10-17

## 2019-10-17 RX ORDER — DICLOFENAC SODIUM 10 MG/G
2 GEL TOPICAL 4 TIMES DAILY
Qty: 100 G | Refills: 0 | Status: SHIPPED | OUTPATIENT
Start: 2019-10-17

## 2019-10-17 NOTE — PROGRESS NOTES
HPI  Ivy Rivers is a 38 y.o. female  Chief Complaint   Patient presents with   [address]     c/o low back pain, states that she was moving over weekend. Reports she was moving boxes on Saturday and injured her left lower back. Reports she was sitting and pulled a box toward her when she noticed the pain. Reports taking Tylenol for the pain with some relief. Reports initially the pain was on her left side and felt better when lying down but then she turned in the bed and the pain became worse. Reports working yesterday in flats instead of her normal heels due to the back pain and now reports right sided groin pain as well. Low back pain is a 7/10 while sitting and described as sharp. Reports pain gets worse when standing or reaching and is then a 10/10. Reports her right groin pain is an 8/10 constant and is achy. Denies radiation of the pain. Denies chest pain and SOB. Denies changes of bladder habits. Reports blood tinged stool for the past 2 weeks or so that she notices when wiping. Reports dark blood and more frequent bowel movements, daily now most days. Denies dizziness and blurred vision. She has had a hysterectomy and does not have a period. She denies hemorrhoids. She denies anal pain. Past Medical History  Past Medical History:   Diagnosis Date    Hypertension     Insomnia        Surgical History  Past Surgical History:   Procedure Laterality Date    HX CERVICAL FUSION      HX HYSTERECTOMY      total    HX OOPHORECTOMY      completion    PLASTIC SURGERY, NECK      ID ANESTH,SURGERY OF SHOULDER          Medications  Current Outpatient Medications   Medication Sig Dispense Refill    diclofenac (VOLTAREN) 1 % gel Apply 2 g to affected area four (4) times daily. 100 g 0    cyclobenzaprine (FLEXERIL) 5 mg tablet Take 1 Tab by mouth nightly.  15 Tab 0    carbinoxamine maleate 4 mg tab       DUPIXENT 300 mg/2 mL syrg syringe       beclomethasone (QVAR) 80 mcg/actuation aero Take 1 Puff by inhalation two (2) times a day.  estradiol (ESTRACE) 1 mg tablet Take 1 Tab by mouth daily. 30 Tab 10    hydroCHLOROthiazide (HYDRODIURIL) 50 mg tablet Take 1 Tab by mouth daily. 30 Tab 5    EPIPEN 2-SOCORRO 0.3 mg/0.3 mL injection INJECT INTO LATERAL THIGH AS NEEDED FOR ANAPHYLAXIS  1    BREO ELLIPTA 200-25 mcg/dose inhaler INHALE 1 PUFF BY MOUTH ONCE A DAY. RINSE AND SPIT AFTER USE FOR 30 DAYS  5    tiotropium bromide (SPIRIVA RESPIMAT) 1.25 mcg/actuation inhaler Take 2 Puffs by inhalation daily.  mometasone (NASONEX) 50 mcg/actuation nasal spray 2 Sprays.       montelukast (SINGULAIR) 10 mg tablet TAKE 1 TABLET BY MOUTH ONCE EVERY EVENING  3    hydrOXYzine pamoate (VISTARIL) 50 mg capsule TAKE 2 CAPSULES BY MOUTH EVERY DAY AT BEDTIME  5    FLOVENT HFA 44 mcg/actuation inhaler TAKE 2 PUFFS BY MOUTH TWICE A DAY  6    albuterol (PROVENTIL VENTOLIN) 2.5 mg /3 mL (0.083 %) nebulizer solution USE 1 VIAL VIA NEBULIZER BY MOUTH EVERY 4 TO 6 HOURS AS NEEDED  6    traZODone (DESYREL) 50 mg tablet TAKE 1-3 TABLETS BY MOUTH AT BEDTIME.  5       Allergies  Allergies   Allergen Reactions    Dilaudid [Hydromorphone] Hives       Family History  Family History   Problem Relation Age of Onset    Hypertension Mother     Hypertension Father     Cancer Father         prostate       Social History  Social History     Socioeconomic History    Marital status: SINGLE     Spouse name: Not on file    Number of children: Not on file    Years of education: Not on file    Highest education level: Not on file   Occupational History    Not on file   Social Needs    Financial resource strain: Not on file    Food insecurity:     Worry: Not on file     Inability: Not on file    Transportation needs:     Medical: Not on file     Non-medical: Not on file   Tobacco Use    Smoking status: Never Smoker    Smokeless tobacco: Never Used   Substance and Sexual Activity    Alcohol use: No    Drug use: No    Sexual activity: Not on file   Lifestyle    Physical activity:     Days per week: Not on file     Minutes per session: Not on file    Stress: Not on file   Relationships    Social connections:     Talks on phone: Not on file     Gets together: Not on file     Attends L[employer]-day service: Not on file     Active member of club or organization: Not on file     Attends meetings of clubs or organizations: Not on file     Relationship status: Not on file    Intimate partner violence:     Fear of current or ex partner: Not on file     Emotionally abused: Not on file     Physically abused: Not on file     Forced sexual activity: Not on file   Other Topics Concern    Not on file   Social History Narrative    Not on file       Problem List  Patient Active Problem List   Diagnosis Code    Severe obesity (BMI 35.0-39. 9) E66.01    Morbid obesity (HCC) E66.01    BMI 40.0-44.9, adult ([facility] Z68.41    Essential hypertension I10       Review of Systems  Review of Systems   Constitutional: Negative for chills and fever. Eyes: Negative for blurred vision. Respiratory: Negative for shortness of breath. Cardiovascular: Negative for chest pain, palpitations and leg swelling. Gastrointestinal: Positive for blood in stool. Negative for abdominal pain, constipation, diarrhea, nausea and vomiting. Genitourinary: Negative for dysuria, frequency, hematuria and urgency. Musculoskeletal: Positive for back pain. Vital Signs  Vitals:    10/17/19 1321   BP: 104/68   Pulse: 61   Resp: 20   Temp: 97.1 °F (36.2 °C)   TempSrc: Oral   SpO2: 97%   Weight: 190 lb 3.2 oz (86.3 kg)   Height: 4' 11\" (1.499 m)   PainSc:   7   PainLoc: Back       Physical Exam  Physical Exam   Constitutional: She is oriented to person, place, and time. Eyes: Pupils are equal, round, and reactive to light. Cardiovascular: Normal rate, regular rhythm and normal heart sounds. Pulmonary/Chest: Effort normal and breath sounds normal.   Abdominal: Soft. Bowel sounds are normal.   Musculoskeletal: She exhibits tenderness. Left lower back   Neurological: She is alert and oriented to person, place, and time. Diagnostics  Orders Placed This Encounter    Influenza virus vaccine (QUADRIVALENT PRES FREE SYRINGE) IM (87022)    OCCULT BLOOD IMMUNOASSAY,DIAGNOSTIC     Standing Status:   Future     Standing Expiration Date:   10/21/0022    METABOLIC PANEL, COMPREHENSIVE     Standing Status:   Future     Standing Expiration Date:   10/17/2020    LIPID PANEL     Standing Status:   Future     Standing Expiration Date:   10/17/2020    CBC WITH AUTOMATED DIFF     Standing Status:   Future     Standing Expiration Date:   10/17/2020    HEMOGLOBIN A1C WITH EAG     Standing Status:   Future     Standing Expiration Date:   10/17/2020    VITAMIN D, 25 HYDROXY     Standing Status:   Future     Standing Expiration Date:   10/17/2020    AMB POC HEMOGLOBIN (HGB)    AMB POC FECAL BLOOD, OCCULT, QL 1 CARD    diclofenac (VOLTAREN) 1 % gel     Sig: Apply 2 g to affected area four (4) times daily. Dispense:  100 g     Refill:  0    cyclobenzaprine (FLEXERIL) 5 mg tablet     Sig: Take 1 Tab by mouth nightly. Dispense:  15 Tab     Refill:  0       Results  Results for orders placed or performed during the hospital encounter of 04/11/19   ALBUMIN   Result Value Ref Range    Albumin 2.9 (L) 3.4 - 5.0 g/dL   CBC WITH AUTOMATED DIFF   Result Value Ref Range    WBC 8.5 4.6 - 13.2 K/uL    RBC 4.35 4.20 - 5.30 M/uL    HGB 11.0 (L) 12.0 - 16.0 g/dL    HCT 35.2 35.0 - 45.0 %    MCV 80.9 74.0 - 97.0 FL    MCH 25.3 24.0 - 34.0 PG    MCHC 31.3 31.0 - 37.0 g/dL    RDW 16.0 (H) 11.6 - 14.5 %    PLATELET 660 328 - 453 K/uL    MPV 9.4 9.2 - 11.8 FL    NEUTROPHILS 71 40 - 73 %    LYMPHOCYTES 21 21 - 52 %    MONOCYTES 7 3 - 10 %    EOSINOPHILS 1 0 - 5 %    BASOPHILS 0 0 - 2 %    ABS. NEUTROPHILS 6.0 1.8 - 8.0 K/UL    ABS. LYMPHOCYTES 1.8 0.9 - 3.6 K/UL    ABS.  MONOCYTES 0.6 0.05 - 1.2 K/UL    ABS. EOSINOPHILS 0.1 0.0 - 0.4 K/UL    ABS. BASOPHILS 0.0 0.0 - 0.1 K/UL    DF AUTOMATED     H PYLORI AB, IGA   Result Value Ref Range    H. pylori Ab, IgA <9.0 0.0 - 8.9 units   H PYLORI AB, IGM   Result Value Ref Range    H. pylori Ab, IgM <9.0 0.0 - 8.9 units   IRON   Result Value Ref Range    Iron 33 (L) 50 - 650 ug/dL   METABOLIC PANEL, BASIC   Result Value Ref Range    Sodium 138 136 - 145 mmol/L    Potassium 3.4 (L) 3.5 - 5.5 mmol/L    Chloride 103 100 - 108 mmol/L    CO2 29 21 - 32 mmol/L    Anion gap 6 3.0 - 18 mmol/L    Glucose 85 74 - 99 mg/dL    BUN 14 7.0 - 18 MG/DL    Creatinine 0.94 0.6 - 1.3 MG/DL    BUN/Creatinine ratio 15 12 - 20      GFR est AA >60 >60 ml/min/1.73m2    GFR est non-AA >60 >60 ml/min/1.73m2    Calcium 8.7 8.5 - 10.1 MG/DL   VITAMIN B1, WHOLE BLOOD   Result Value Ref Range    Vitamin B1 141.2 66.5 - 200.0 nmol/L   VITAMIN D, 25 HYDROXY   Result Value Ref Range    Vitamin D 25-Hydroxy 42.5 30 - 100 ng/mL   EKG, 12 LEAD, INITIAL   Result Value Ref Range    Ventricular Rate 70 BPM    Atrial Rate 70 BPM    P-R Interval 144 ms    QRS Duration 84 ms    Q-T Interval 386 ms    QTC Calculation (Bezet) 416 ms    Calculated P Axis 36 degrees    Calculated R Axis 36 degrees    Calculated T Axis 20 degrees    Diagnosis       Normal sinus rhythm  Normal ECG  When compared with ECG of 05-APR-2018 16:06,  No significant change was found  Confirmed by Marvin Brower MD, ----- ([id number]) on 4/11/2019 5:10:34 PM         Assessment and Plan  Diagnoses and all orders for this visit:    1. Acute left-sided low back pain without sciatica  -     diclofenac (VOLTAREN) 1 % gel; Apply 2 g to affected area four (4) times daily. -     cyclobenzaprine (FLEXERIL) 5 mg tablet; Take 1 Tab by mouth nightly. -     METABOLIC PANEL, COMPREHENSIVE; Future  -     CBC WITH AUTOMATED DIFF; Future    2. Right groin pain  -     diclofenac (VOLTAREN) 1 % gel; Apply 2 g to affected area four (4) times daily.   - cyclobenzaprine (FLEXERIL) 5 mg tablet; Take 1 Tab by mouth nightly. -     METABOLIC PANEL, COMPREHENSIVE; Future  -     CBC WITH AUTOMATED DIFF; Future    3. Blood in stool  -     AMB POC HEMOGLOBIN (HGB)  -     OCCULT BLOOD IMMUNOASSAY,DIAGNOSTIC; Future  -     AMB POC FECAL BLOOD, OCCULT, QL 1 CARD  -     METABOLIC PANEL, COMPREHENSIVE; Future  -     CBC WITH AUTOMATED DIFF; Future    4. Vitamin D deficiency  -     VITAMIN D, 25 HYDROXY; Future    5. Prediabetes  -     METABOLIC PANEL, COMPREHENSIVE; Future  -     LIPID PANEL; Future  -     CBC WITH AUTOMATED DIFF; Future  -     HEMOGLOBIN A1C WITH EAG; Future  -     VITAMIN D, 25 HYDROXY; Future    6. Encounter for immunization  -     INFLUENZA VIRUS VAC QUAD,SPLIT,PRESV FREE SYRINGE IM      Medication, side effects, possible allergic reactions and warnings reviewed with patient. Patient verbalized understanding. After care summary printed and reviewed with patient. Plan reviewed with patient. Questions answered. Patient verbalized understanding of plan and is in agreement with plan. Patient to follow up in one week or earlier if symptoms worsen. Follow-up and Dispositions    · Return in about 1 week (around 10/24/2019), or if symptoms worsen or fail to improve, for blood in stool.        YONATHAN RyanC

## 2019-10-17 NOTE — PROGRESS NOTES
Stacia Guzmán presents today for   Chief Complaint   Patient presents with   [address] 21     c/o low back pain, states that she was moving over weekend. Is someone accompanying this pt? No    Is the patient using any DME equipment during OV? No    Depression Screening:  3 most recent PHQ Screens 1/14/2019   PHQ Not Done -   Little interest or pleasure in doing things Not at all   Feeling down, depressed, irritable, or hopeless Not at all   Total Score PHQ 2 0       Learning Assessment:  Learning Assessment 4/11/2019   PRIMARY LEARNER Patient   HIGHEST LEVEL OF EDUCATION - PRIMARY LEARNER  -   BARRIERS PRIMARY LEARNER NONE   CO-LEARNER CAREGIVER -   PRIMARY LANGUAGE ENGLISH    NEED -   LEARNER PREFERENCE PRIMARY DEMONSTRATION   ANSWERED BY self   RELATIONSHIP SELF       Abuse Screening:  Abuse Screening Questionnaire 1/14/2019   Do you ever feel afraid of your partner? N   Are you in a relationship with someone who physically or mentally threatens you? N   Is it safe for you to go home? Y         Health Maintenance Due   Topic Date Due    Pneumococcal 0-64 years (1 of 1 - PPSV23) 06/15/1980    DTaP/Tdap/Td series (1 - Tdap) 06/15/1995    Influenza Age 5 to Adult  08/01/2019   . Health Maintenance reviewed and discussed and ordered per Provider. Stacia Guzmán is updated on all     Coordination of Care  1. Have you been to the ER, urgent care clinic since your last visit? Hospitalized since your last visit? No    2. Have you seen or consulted any other health care providers outside of the [address] [place] [address] since your last visit? Include any pap smears or colon screening. No      Advance Directive:  1. Do you have an advance directive in place? Patient Reply:No    2. If not, would you like material regarding how to put one in place?  Patient Reply: No

## 2019-10-17 NOTE — PATIENT INSTRUCTIONS
Vaccine Information Statement    Influenza (Flu) Vaccine (Inactivated or Recombinant): What You Need to Know    Many Vaccine Information Statements are available in Indonesian and other languages. See www.immunize.org/vis  Hojas de información sobre vacunas están disponibles en español y en muchos otros idiomas. Visite www.immunize.org/vis    1. Why get vaccinated? Influenza vaccine can prevent influenza (flu). Flu is a contagious disease that spreads around the [facility] every year, usually between October and May. Anyone can get the flu, but it is more dangerous for some people. Infants and young children, people 72years of age and older, pregnant women, and people with certain health conditions or a weakened immune system are at greatest risk of flu complications. Pneumonia, bronchitis, sinus infections and ear infections are examples of flu-related complications. If you have a medical condition, such as heart disease, cancer or diabetes, flu can make it worse. Flu can cause fever and chills, sore throat, muscle aches, fatigue, cough, headache, and runny or stuffy nose. Some people may have vomiting and diarrhea, though this is more common in children than adults. Each year thousands of people in the [facility] die from flu, and many more are hospitalized. Flu vaccine prevents millions of illnesses and flu-related visits to the doctor each year. 2. Influenza vaccines     CDC recommends everyone 10months of age and older get vaccinated every flu season. Children 6 months through 6years of age may need 2 doses during a single flu season. Everyone else needs only 1 dose each flu season. It takes about 2 weeks for protection to develop after vaccination. There are many flu viruses, and they are always changing. Each year a new flu vaccine is made to protect against three or four viruses that are likely to cause disease in the upcoming flu season.  Even when the vaccine doesnt exactly match these viruses, it may still provide some protection. Influenza vaccine does not cause flu. Influenza vaccine may be given at the same time as other vaccines. 3. Talk with your health care provider    Tell your vaccine provider if the person getting the vaccine:   Has had an allergic reaction after a previous dose of influenza vaccine, or has any severe, life-threatening allergies.  Has ever had Guillain-Barré Syndrome (also called GBS). In some cases, your health care provider may decide to postpone influenza vaccination to a future visit. People with minor illnesses, such as a cold, may be vaccinated. People who are moderately or severely ill should usually wait until they recover before getting influenza vaccine. Your health care provider can give you more information. 4. Risks of a reaction     Soreness, redness, and swelling where shot is given, fever, muscle aches, and headache can happen after influenza vaccine.  There may be a very small increased risk of Guillain-Barré Syndrome (GBS) after inactivated influenza vaccine (the flu shot). [facility] children who get the flu shot along with pneumococcal vaccine (PCV13), and/or DTaP vaccine at the same time might be slightly more likely to have a seizure caused by fever. Tell your health care provider if a child who is getting flu vaccine has ever had a seizure. People sometimes faint after medical procedures, including vaccination. Tell your provider if you feel dizzy or have vision changes or ringing in the ears. As with any medicine, there is a very remote chance of a vaccine causing a severe allergic reaction, other serious injury, or death. 5. What if there is a serious problem? An allergic reaction could occur after the vaccinated person leaves the clinic.  If you see signs of a severe allergic reaction (hives, swelling of the face and throat, difficulty breathing, a fast heartbeat, dizziness, or weakness), call 9-1-1 and get the person to the nearest hospital.    For other signs that concern you, call your health care provider. Adverse reactions should be reported to the Vaccine Adverse Event Reporting System (VAERS). Your health care provider will usually file this report, or you can do it yourself. Visit the VAERS website at www.vaers. [facility].gov or call 9-728.151.8076. VAERS is only for reporting reactions, and VAERS staff do not give medical advice. 6. The National Vaccine Injury Compensation Program    The [facility] Vaccine Injury Compensation Program (VICP) is a federal program that was created to compensate people who may have been injured by certain vaccines. Visit the VICP website at www.[facility].gov/vaccinecompensation or call 8-188.829.4730 to learn about the program and about filing a claim. There is a time limit to file a claim for compensation. 7. How can I learn more?  Ask your health care provider.  Call your local or state health department.  Contact the Centers for Disease Control and Prevention (CDC):  - Call 6-283.233.1437 (1-800-CDC-INFO) or  - Visit CDCs influenza website at www.cdc.gov/flu    Vaccine Information Statement (Interim)  Inactivated Influenza Vaccine   8/15/2019  42 SAHIL Lopez [id number]   Department of Health and Human Services  Centers for Disease Control and Prevention    Office Use Only

## 2019-10-17 NOTE — LETTER
NOTIFICATION RETURN TO WORK / SCHOOL 
 
10/17/2019 2:22 PM 
 
Ms. Ivy Rivers [address] [address] To Whom It May Concern: 
 
Ivy Rivers is currently under the care of Gómez Gillespie. She will return to work/school on: [id number] If there are questions or concerns please have the patient contact our office.  
 
 
 
Sincerely, 
 
 
Lenka Huntley NP

## 2019-10-17 NOTE — PROGRESS NOTES
Davido Land 1975 female who presents for routine immunizations. Patient denies any symptoms , reactions or allergies that would exclude them from being immunized today. Risks and adverse reactions were discussed and the VIS was given to them. All questions were addressed. Order placed for Flulaval,  per Verbal Order from [facility] with read back. Patient was observed for 15 min post injection. There were no reactions observed.     Genevieve Sherman 74, LPN

## 2019-10-18 ENCOUNTER — HOSPITAL ENCOUNTER (OUTPATIENT)
Dept: LAB | Age: 44
Discharge: HOME | End: 2019-10-18

## 2019-10-18 LAB — XX-LABCORP SPECIMEN COL,LCBCF: NORMAL

## 2019-10-18 PROCEDURE — 99001 SPECIMEN HANDLING PT-LAB: CPT

## 2019-10-19 LAB
25(OH)D3+25(OH)D2 SERPL-MCNC: 26.5 NG/ML (ref 30–100)
ALBUMIN SERPL-MCNC: 3.6 G/DL (ref 3.5–5.5)
ALBUMIN/GLOB SERPL: 1 {RATIO} (ref 1.2–2.2)
ALP SERPL-CCNC: 79 IU/L (ref 39–117)
ALT SERPL-CCNC: 9 IU/L (ref 0–32)
AST SERPL-CCNC: 13 IU/L (ref 0–40)
BASOPHILS # BLD AUTO: 0 X10E3/UL (ref 0–0.2)
BASOPHILS NFR BLD AUTO: 0 %
BILIRUB SERPL-MCNC: 0.3 MG/DL (ref 0–1.2)
BUN SERPL-MCNC: 11 MG/DL (ref 6–24)
BUN/CREAT SERPL: 14 (ref 9–23)
CALCIUM SERPL-MCNC: 9 MG/DL (ref 8.7–10.2)
CHLORIDE SERPL-SCNC: 99 MMOL/L (ref 96–106)
CHOLEST SERPL-MCNC: 297 MG/DL (ref 100–199)
CO2 SERPL-SCNC: 26 MMOL/L (ref 20–29)
COMMENT, 011824: ABNORMAL
CREAT SERPL-MCNC: 0.79 MG/DL (ref 0.57–1)
EOSINOPHIL # BLD AUTO: 0.1 X10E3/UL (ref 0–0.4)
EOSINOPHIL NFR BLD AUTO: 3 %
ERYTHROCYTE [DISTWIDTH] IN BLOOD BY AUTOMATED COUNT: 15.2 % (ref 12.3–15.4)
EST. AVERAGE GLUCOSE BLD GHB EST-MCNC: 120 MG/DL
GLOBULIN SER CALC-MCNC: 3.5 G/DL (ref 1.5–4.5)
GLUCOSE SERPL-MCNC: 90 MG/DL (ref 65–99)
HBA1C MFR BLD: 5.8 % (ref 4.8–5.6)
HCT VFR BLD AUTO: 35.2 % (ref 34–46.6)
HDLC SERPL-MCNC: 52 MG/DL
HGB BLD-MCNC: 11.4 G/DL (ref 11.1–15.9)
IMM GRANULOCYTES # BLD AUTO: 0 X10E3/UL (ref 0–0.1)
IMM GRANULOCYTES NFR BLD AUTO: 0 %
INTERPRETATION, 910389: NORMAL
LDLC SERPL CALC-MCNC: 217 MG/DL (ref 0–99)
LYMPHOCYTES # BLD AUTO: 1 X10E3/UL (ref 0.7–3.1)
LYMPHOCYTES NFR BLD AUTO: 18 %
MCH RBC QN AUTO: 25.7 PG (ref 26.6–33)
MCHC RBC AUTO-ENTMCNC: 32.4 G/DL (ref 31.5–35.7)
MCV RBC AUTO: 79 FL (ref 79–97)
MONOCYTES # BLD AUTO: 0.4 X10E3/UL (ref 0.1–0.9)
MONOCYTES NFR BLD AUTO: 8 %
NEUTROPHILS # BLD AUTO: 3.8 X10E3/UL (ref 1.4–7)
NEUTROPHILS NFR BLD AUTO: 71 %
PLATELET # BLD AUTO: 377 X10E3/UL (ref 150–450)
POTASSIUM SERPL-SCNC: 3.4 MMOL/L (ref 3.5–5.2)
PROT SERPL-MCNC: 7.1 G/DL (ref 6–8.5)
RBC # BLD AUTO: 4.44 X10E6/UL (ref 3.77–5.28)
SODIUM SERPL-SCNC: 142 MMOL/L (ref 134–144)
SPECIMEN STATUS REPORT, ROLRST: NORMAL
TRIGL SERPL-MCNC: 139 MG/DL (ref 0–149)
VLDLC SERPL CALC-MCNC: 28 MG/DL (ref 5–40)
WBC # BLD AUTO: 5.4 X10E3/UL (ref 3.4–10.8)

## 2019-10-20 RX ORDER — HYDROCHLOROTHIAZIDE 50 MG/1
TABLET ORAL
Qty: 30 TAB | Refills: 5 | Status: SHIPPED | OUTPATIENT
Start: 2019-10-20

## 2019-10-24 ENCOUNTER — OFFICE VISIT (OUTPATIENT)
Dept: FAMILY MEDICINE CLINIC | Age: 44
End: 2019-10-24

## 2019-10-24 VITALS
WEIGHT: 186 LBS | BODY MASS INDEX: 37.5 KG/M2 | HEART RATE: 74 BPM | TEMPERATURE: 97.1 F | SYSTOLIC BLOOD PRESSURE: 102 MMHG | HEIGHT: 59 IN | OXYGEN SATURATION: 97 % | DIASTOLIC BLOOD PRESSURE: 82 MMHG | RESPIRATION RATE: 18 BRPM

## 2019-10-24 DIAGNOSIS — E78.5 HYPERLIPIDEMIA, UNSPECIFIED HYPERLIPIDEMIA TYPE: ICD-10-CM

## 2019-10-24 DIAGNOSIS — M54.50 ACUTE LEFT-SIDED LOW BACK PAIN WITHOUT SCIATICA: Primary | ICD-10-CM

## 2019-10-24 DIAGNOSIS — Z71.2 ENCOUNTER TO DISCUSS TEST RESULTS: ICD-10-CM

## 2019-10-24 DIAGNOSIS — E55.9 VITAMIN D INSUFFICIENCY: ICD-10-CM

## 2019-10-24 DIAGNOSIS — R73.03 PREDIABETES: ICD-10-CM

## 2019-10-24 LAB — HEMOCCULT STL QL IA: NEGATIVE

## 2019-10-24 NOTE — LETTER
NOTIFICATION RETURN TO WORK / SCHOOL 
 
10/24/2019 3:12 PM 
 
Ms. Socorro Leon [address] [address] To Whom It May Concern: 
 
Socorro Leon is currently under the care of Gómez Gillespie. She will return to work/school on: 10- If there are questions or concerns please have the patient contact our office.  
 
 
 
Sincerely, 
 
 
Winter Fried NP

## 2019-10-24 NOTE — PATIENT INSTRUCTIONS
Body Mass Index: Care Instructions Your Care Instructions Body mass index (BMI) can help you see if your weight is raising your risk for health problems. It uses a formula to compare how much you weigh with how tall you are. · A BMI lower than 18.5 is considered underweight. · A BMI between 18.5 and 24.9 is considered healthy. · A BMI between 25 and 29.9 is considered overweight. A BMI of 30 or higher is considered obese. If your BMI is in the normal range, it means that you have a lower risk for weight-related health problems. If your BMI is in the overweight or obese range, you may be at increased risk for weight-related health problems, such as high blood pressure, heart disease, stroke, arthritis or joint pain, and diabetes. If your BMI is in the underweight range, you may be at increased risk for health problems such as fatigue, lower protection (immunity) against illness, muscle loss, bone loss, hair loss, and hormone problems. BMI is just one measure of your risk for weight-related health problems. You may be at higher risk for health problems if you are not active, you eat an unhealthy diet, or you drink too much alcohol or use tobacco products. Follow-up care is a key part of your treatment and safety. Be sure to make and go to all appointments, and call your doctor if you are having problems. It's also a good idea to know your test results and keep a list of the medicines you take. How can you care for yourself at home? · Practice healthy eating habits. This includes eating plenty of fruits, vegetables, whole grains, lean protein, and low-fat dairy. · If your doctor recommends it, get more exercise. Walking is a good choice. Bit by bit, increase the amount you walk every day. Try for at least 30 minutes on most days of the week. · Do not smoke. Smoking can increase your risk for health problems.  If you need help quitting, talk to your doctor about stop-smoking programs and medicines. These can increase your chances of quitting for good. · Limit alcohol to 2 drinks a day for men and 1 drink a day for women. Too much alcohol can cause health problems. If you have a BMI higher than 25 · Your doctor may do other tests to check your risk for weight-related health problems. This may include measuring the distance around your waist. A waist measurement of more than 40 inches in men or 35 inches in women can increase the risk of weight-related health problems. · Talk with your doctor about steps you can take to stay healthy or improve your health. You may need to make lifestyle changes to lose weight and stay healthy, such as changing your diet and getting regular exercise. If you have a BMI lower than 18.5 · Your doctor may do other tests to check your risk for health problems. · Talk with your doctor about steps you can take to stay healthy or improve your health. You may need to make lifestyle changes to gain or maintain weight and stay healthy, such as getting more healthy foods in your diet and doing exercises to build muscle. Where can you learn more? Go to http://bashir-princess.info/. Enter S176 in the search box to learn more about \"Body Mass Index: Care Instructions. \" Current as of: October 13, 2016 Content Version: 11.4 © 7853-7099 Healthwise, Incorporated. Care instructions adapted under license by [employer] (which disclaims liability or warranty for this information). If you have questions about a medical condition or this instruction, always ask your healthcare professional. [address] any warranty or liability for your use of this information. Hyperlipidemia: After Your Visit Your Care Instructions Hyperlipidemia is too much fat in your blood. The body has several kinds of fat, including cholesterol and triglycerides.  Your body needs fat for many things, such as making new cells. But too much fat in your blood increases your chances of having a heart attack or stroke. You may be able to lower your cholesterol and triglycerides with a heart-healthy diet, exercise, and if needed, medicine. Your doctor may want you to try lifestyle changes first to see whether they lower the fat in your blood. You may need to take medicine if lifestyle changes do not lower the fat in your blood enough. Follow-up care is a key part of your treatment and safety. Be sure to make and go to all appointments, and call your doctor if you are having problems. Its also a good idea to know your test results and keep a list of the medicines you take. How can you care for yourself at home? Take your medicines · Take your medicines exactly as prescribed. Call your doctor if you think you are having a problem with your medicine. · If you take medicine to lower your cholesterol, go to follow-up visits. You will need to have blood tests. · Do not take large doses of niacin, which is a B vitamin, while taking medicine called statins. It may increase the chance of muscle pain and liver problems. · Talk to your doctor about avoiding grapefruit juice if you are taking statins. Grapefruit juice can raise the level of this medicine in your blood. This could increase side effects. Eat more fruits, vegetables, and fiber · Fruits and vegetables have lots of nutrients that help protect against heart disease, and they have littleif anyfat. Try to eat at least five servings a day. Dark green, deep orange, or yellow fruits and vegetables are healthy choices. · Keep carrots, celery, and other veggies handy for snacks. Buy fruit that is in season and store it where you can see it so that you will be tempted to eat it. Cook dishes that have a lot of veggies in them, such as stir-fries and soups.  
· Foods high in fiber may reduce your cholesterol and provide important vitamins and minerals. High-fiber foods include whole-grain cereals and breads, oatmeal, beans, brown rice, citrus fruits, and apples. · Buy whole-grain breads and cereals instead of white bread and pastries. Limit saturated fat · Read food labels and try to avoid saturated fat and trans fat. They increase your risk of heart disease. · Use olive or canola oil when you cook. Try cholesterol-lowering spreads, such as Benecol or Take Control. · Bake, broil, grill, or steam foods instead of frying them. · Limit the amount of high-fat meats you eat, including hot dogs and sausages. Cut out all visible fat when you prepare meat. · Eat fish, skinless poultry, and soy products such as tofu instead of high-fat meats. Soybeans may be especially good for your heart. Eat at least two servings of fish a week. Certain fish, such as salmon, contain omega-3 fatty acids, which may help reduce your risk of heart attack. · Choose low-fat or fat-free milk and dairy products. Get exercise, limit alcohol, and quit smoking · Get more exercise. Work with your doctor to set up an exercise program. Even if you can do only a small amount, exercise will help you get stronger, have more energy, and manage your weight and your stress. Walking is an easy way to get exercise. Gradually increase the amount you walk every day. Aim for at least 30 minutes on most days of the week. You also may want to swim, bike, or do other activities. · Limit alcohol to no more than 2 drinks a day for men and 1 drink a day for women. · Do not smoke. If you need help quitting, talk to your doctor about stop-smoking programs and medicines. These can increase your chances of quitting for good. When should you call for help? Call 911 anytime you think you may need emergency care. For example, call if: 
· You have symptoms of a heart attack. These may include: ¨ Chest pain or pressure, or a strange feeling in the chest. 
¨ Sweating. ¨ Shortness of breath. ¨ Nausea or vomiting. ¨ Pain, pressure, or a strange feeling in the back, neck, jaw, or upper belly or in one or both shoulders or arms. ¨ Lightheadedness or sudden weakness. ¨ A fast or irregular heartbeat. After you call 911, the  may tell you to chew 1 adult-strength or 2 to 4 low-dose aspirin. Wait for an ambulance. Do not try to drive yourself. · You have signs of a stroke. These may include: 
¨ Sudden numbness, paralysis, or weakness in your face, arm, or leg, especially on only one side of your body. ¨ New problems with walking or balance. ¨ Sudden vision changes. ¨ Drooling or slurred speech. ¨ New problems speaking or understanding simple statements, or feeling confused. ¨ A sudden, severe headache that is different from past headaches. · You passed out (lost consciousness). Call your doctor now or seek immediate medical care if: 
· You have muscle pain or weakness. Watch closely for changes in your health, and be sure to contact your doctor if: 
· You are very tired. · You have an upset stomach, gas, constipation, or belly pain or cramps. Where can you learn more? Go to Escom.be Enter C406 in the search box to learn more about \"Hyperlipidemia: After Your Visit. \"  
© 6948-9226 Healthwise, Incorporated. Care instructions adapted under license by [employer] (which disclaims liability or warranty for this information). This care instruction is for use with your licensed healthcare professional. If you have questions about a medical condition or this instruction, always ask your healthcare professional. [place] [address] any warranty or liability for your use of this information. Content Version: 6.7.551032; Last Revised: October 13, 2011 High Cholesterol: Care Instructions Your Care Instructions Cholesterol is a type of fat in your blood.  It is needed for many body functions, such as making new cells. Cholesterol is made by your body. It also comes from food you eat. High cholesterol means that you have too much of the fat in your blood. This raises your risk of a heart attack and stroke. LDL and HDL are part of your total cholesterol. LDL is the \"bad\" cholesterol. High LDL can raise your risk for heart disease, heart attack, and stroke. HDL is the \"good\" cholesterol. It helps clear bad cholesterol from the body. High HDL is linked with a lower risk of heart disease, heart attack, and stroke. Your cholesterol levels help your doctor find out your risk for having a heart attack or stroke. You and your doctor can talk about whether you need to lower your risk and what treatment is best for you. A heart-healthy lifestyle along with medicines can help lower your cholesterol and your risk. The way you choose to lower your risk will depend on how high your risk is for heart attack and stroke. It will also depend on how you feel about taking medicines. Follow-up care is a key part of your treatment and safety. Be sure to make and go to all appointments, and call your doctor if you are having problems. It's also a good idea to know your test results and keep a list of the medicines you take. How can you care for yourself at home? · Eat a variety of foods every day. Good choices include fruits, vegetables, whole grains (like oatmeal), dried beans and peas, nuts and seeds, soy products (like tofu), and fat-free or low-fat dairy products. · Replace butter, margarine, and hydrogenated or partially hydrogenated oils with olive and canola oils. (Canola oil margarine without trans fat is fine.) · Replace red meat with fish, poultry, and soy protein (like tofu). · Limit processed and packaged foods like chips, crackers, and cookies. · Bake, broil, or steam foods. Don't cohen them. · Be physically active.  Get at least 30 minutes of exercise on most days of the week. Walking is a good choice. You also may want to do other activities, such as running, swimming, cycling, or playing tennis or team sports. · Stay at a healthy weight or lose weight by making the changes in eating and physical activity listed above. Losing just a small amount of weight, even 5 to 10 pounds, can reduce your risk for having a heart attack or stroke. · Do not smoke. When should you call for help? Watch closely for changes in your health, and be sure to contact your doctor if: 
  · You need help making lifestyle changes.  
  · You have questions about your medicine. Where can you learn more? Go to http://bashir-princess.info/. Enter D174 in the search box to learn more about \"High Cholesterol: Care Instructions. \" Current as of: April 9, 2019 Content Version: 12.2 © 5871-1734 [employer]. Care instructions adapted under license by [employer] (which disclaims liability or warranty for this information). If you have questions about a medical condition or this instruction, always ask your healthcare professional. [address] any warranty or liability for your use of this information.

## 2019-10-24 NOTE — PROGRESS NOTES
Grisel Mejía presents today for   Chief Complaint   Patient presents with    Hip Pain     f/u related to hip pain.  Results     labs complete       Is someone accompanying this pt? No    Is the patient using any DME equipment during OV? NO    Depression Screening:  3 most recent PHQ Screens 1/14/2019   PHQ Not Done -   Little interest or pleasure in doing things Not at all   Feeling down, depressed, irritable, or hopeless Not at all   Total Score PHQ 2 0       Learning Assessment:  Learning Assessment 4/11/2019   PRIMARY LEARNER Patient   HIGHEST LEVEL OF EDUCATION - PRIMARY LEARNER  -   BARRIERS PRIMARY LEARNER NONE   CO-LEARNER CAREGIVER -   PRIMARY LANGUAGE ENGLISH    NEED -   LEARNER PREFERENCE PRIMARY DEMONSTRATION   ANSWERED BY self   RELATIONSHIP SELF       Abuse Screening:  Abuse Screening Questionnaire 1/14/2019   Do you ever feel afraid of your partner? N   Are you in a relationship with someone who physically or mentally threatens you? N   Is it safe for you to go home? Y         Health Maintenance Due   Topic Date Due    Pneumococcal 0-64 years (1 of 1 - PPSV23) 06/15/1980    DTaP/Tdap/Td series (1 - Tdap) 06/15/1995   . Health Maintenance reviewed and discussed and ordered per Provider. Coordination of Care  1. Have you been to the ER, urgent care clinic since your last visit? Hospitalized since your last visit? No    2. Have you seen or consulted any other health care providers outside of the [address] [place] [address] since your last visit? Include any pap smears or colon screening. No        Advance Directive:  1. Do you have an advance directive in place? Patient Reply:No    2. If not, would you like material regarding how to put one in place?  Patient Reply: No

## 2019-10-24 NOTE — PROGRESS NOTES
HPI  Ottoniel Platt is a 38 y.o. female  Chief Complaint   Patient presents with    Hip Pain     f/u related to hip pain.  Results     labs complete     Reports left hip pain continues. Reports pain is 2/93 with certain movements. Reports her left leg herminia when she stands on it to long. She does take vitamin D. Reports she does eat gourmet pizza and she has restarted her fried food intake. She denies having any more blood in her stool. Past Medical History  Past Medical History:   Diagnosis Date    Hypertension     Insomnia        Surgical History  Past Surgical History:   Procedure Laterality Date    HX CERVICAL FUSION      HX HYSTERECTOMY      total    HX OOPHORECTOMY      completion    PLASTIC SURGERY, NECK      CT ANESTH,SURGERY OF SHOULDER          Medications  Current Outpatient Medications   Medication Sig Dispense Refill    hydroCHLOROthiazide (HYDRODIURIL) 50 mg tablet TAKE 1 TABLET BY MOUTH EVERY DAY 30 Tab 5    diclofenac (VOLTAREN) 1 % gel Apply 2 g to affected area four (4) times daily. 100 g 0    cyclobenzaprine (FLEXERIL) 5 mg tablet Take 1 Tab by mouth nightly. 15 Tab 0    carbinoxamine maleate 4 mg tab       DUPIXENT 300 mg/2 mL syrg syringe       beclomethasone (QVAR) 80 mcg/actuation aero Take 1 Puff by inhalation two (2) times a day.  estradiol (ESTRACE) 1 mg tablet Take 1 Tab by mouth daily. 30 Tab 10    EPIPEN 2-SOCORRO 0.3 mg/0.3 mL injection INJECT INTO LATERAL THIGH AS NEEDED FOR ANAPHYLAXIS  1    BREO ELLIPTA 200-25 mcg/dose inhaler INHALE 1 PUFF BY MOUTH ONCE A DAY. RINSE AND SPIT AFTER USE FOR 30 DAYS  5    tiotropium bromide (SPIRIVA RESPIMAT) 1.25 mcg/actuation inhaler Take 2 Puffs by inhalation daily.  mometasone (NASONEX) 50 mcg/actuation nasal spray 2 Sprays.       montelukast (SINGULAIR) 10 mg tablet TAKE 1 TABLET BY MOUTH ONCE EVERY EVENING  3    hydrOXYzine pamoate (VISTARIL) 50 mg capsule TAKE 2 CAPSULES BY MOUTH EVERY DAY AT BEDTIME  5    FLOVENT HFA 44 mcg/actuation inhaler TAKE 2 PUFFS BY MOUTH TWICE A DAY  6    albuterol (PROVENTIL VENTOLIN) 2.5 mg /3 mL (0.083 %) nebulizer solution USE 1 VIAL VIA NEBULIZER BY MOUTH EVERY 4 TO 6 HOURS AS NEEDED  6    traZODone (DESYREL) 50 mg tablet TAKE 1-3 TABLETS BY MOUTH AT BEDTIME.  5       Allergies  Allergies   Allergen Reactions    Dilaudid [Hydromorphone] Hives       Family History  Family History   Problem Relation Age of Onset    Hypertension Mother     Hypertension Father     Cancer Father         prostate       Social History  Social History     Socioeconomic History    Marital status: SINGLE     Spouse name: Not on file    Number of children: Not on file    Years of education: Not on file    Highest education level: Not on file   Occupational History    Not on file   Social Needs    Financial resource strain: Not on file    Food insecurity:     Worry: Not on file     Inability: Not on file    Transportation needs:     Medical: Not on file     Non-medical: Not on file   Tobacco Use    Smoking status: Never Smoker    Smokeless tobacco: Never Used   Substance and Sexual Activity    Alcohol use: No    Drug use: No    Sexual activity: Not on file   Lifestyle    Physical activity:     Days per week: Not on file     Minutes per session: Not on file    Stress: Not on file   Relationships    Social connections:     Talks on phone: Not on file     Gets together: Not on file     Attends Christian service: Not on file     Active member of club or organization: Not on file     Attends meetings of clubs or organizations: Not on file     Relationship status: Not on file    Intimate partner violence:     Fear of current or ex partner: Not on file     Emotionally abused: Not on file     Physically abused: Not on file     Forced sexual activity: Not on file   Other Topics Concern    Not on file   Social History Narrative    Not on file       Problem List  Patient Active Problem List Diagnosis Code    Severe obesity (BMI 35.0-39. 9) E66.01    Morbid obesity (HCC) E66.01    BMI 40.0-44.9, adult [facility] Z68.41    Essential hypertension I10       Review of Systems  Review of Systems   Constitutional: Negative for chills and fever. Respiratory: Negative for shortness of breath. Cardiovascular: Negative for chest pain. Gastrointestinal: Negative for abdominal pain, blood in stool, nausea and vomiting. Genitourinary: Negative. Musculoskeletal: Positive for back pain, joint pain and myalgias. Negative for falls. Vital Signs  Vitals:    10/24/19 1443   BP: 102/82   Pulse: 74   Resp: 18   Temp: 97.1 °F (36.2 °C)   TempSrc: Oral   SpO2: 97%   Weight: 186 lb (84.4 kg)   Height: 4' 11\" (1.499 m)   PainSc:   7   PainLoc: Hip       Physical Exam  Physical Exam   Constitutional: She is oriented to person, place, and time. HENT:   Mouth/Throat: Oropharynx is clear and moist.   Eyes: Pupils are equal, round, and reactive to light. Cardiovascular: Normal rate, regular rhythm and intact distal pulses. Pulmonary/Chest: Effort normal and breath sounds normal.   Abdominal: Soft. Bowel sounds are normal.   Musculoskeletal: She exhibits no edema or tenderness. Neurological: She is alert and oriented to person, place, and time. Diagnostics  Orders Placed This Encounter    LIPID PANEL     Standing Status:   Future     Standing Expiration Date:   10/24/2020    VITAMIN D, 25 HYDROXY     Standing Status:   Future     Number of Occurrences:   1     Standing Expiration Date:   10/23/2020    REFERRAL TO ORTHOPEDICS     Referral Priority:   Routine     Referral Type:   Consultation     Referral Reason:   Specialty Services Required     Referred to Provider:   Artemio Gaspar MD     Number of Visits Requested:   1       Results  Results for orders placed or performed during the hospital encounter of 10/18/19   [facility] SPECIMEN COL   Result Value Ref Range    [facility]. Specimens collected/sent to [facility]. Please direct inquiries to ([phone]). Assessment and Plan  Diagnoses and all orders for this visit:    1. Acute left-sided low back pain without sciatica  -     REFERRAL TO ORTHOPEDICS    2. Encounter to discuss test results    3. Vitamin D insufficiency  -     VITAMIN D, 25 HYDROXY; Future    4. Hyperlipidemia, unspecified hyperlipidemia type  -     LIPID PANEL; Future    5. Prediabetes    Patient declines medications for her cholesterol. If no improvement of her cholesterol at next visit we will initiate medication   Patient to take OTC vitamin D. After care summary printed and reviewed with patient. Plan reviewed with patient. Questions answered. Patient verbalized understanding of plan and is in agreement with plan. Patient to follow up in four months or earlier if symptoms worsen. Follow-up and Dispositions    · Return in about 4 months (around 2/24/2020), or if symptoms worsen or fail to improve, for rotuine care with PCP, Vitamin D, cholesterol, pain, lab results. LARRY Gold  Discussed the patient's BMI with her. The BMI follow up plan is as follows:     dietary management education, guidance, and counseling  encourage exercise  monitor weight  prescribed dietary intake    An After Visit Summary was printed and given to the patient.   LARRY Gold

## 2019-11-04 ENCOUNTER — OFFICE VISIT (OUTPATIENT)
Dept: ORTHOPEDIC SURGERY | Facility: CLINIC | Age: 44
End: 2019-11-04

## 2019-11-04 VITALS
RESPIRATION RATE: 16 BRPM | DIASTOLIC BLOOD PRESSURE: 80 MMHG | HEART RATE: 84 BPM | HEIGHT: 59 IN | WEIGHT: 183 LBS | BODY MASS INDEX: 36.89 KG/M2 | SYSTOLIC BLOOD PRESSURE: 125 MMHG | TEMPERATURE: 98.5 F

## 2019-11-04 DIAGNOSIS — M54.16 LUMBAR RADICULOPATHY: ICD-10-CM

## 2019-11-04 DIAGNOSIS — M51.36 DDD (DEGENERATIVE DISC DISEASE), LUMBAR: Primary | ICD-10-CM

## 2019-11-04 DIAGNOSIS — M54.50 LUMBAR PAIN: ICD-10-CM

## 2019-11-04 DIAGNOSIS — M25.552 LEFT HIP PAIN: ICD-10-CM

## 2019-11-04 RX ORDER — BETAMETHASONE SODIUM PHOSPHATE AND BETAMETHASONE ACETATE 3; 3 MG/ML; MG/ML
6 INJECTION, SUSPENSION INTRA-ARTICULAR; INTRALESIONAL; INTRAMUSCULAR; SOFT TISSUE ONCE
Qty: 0.5 ML | Refills: 0
Start: 2019-11-04 | End: 2019-11-04

## 2019-11-04 NOTE — LETTER
11/4/2019 2:46 PM 
 
Ms. Martin Mccurdy [address] [place] [address] The above patient was seen in our office today.   
 
 
 
Sincerely, 
 
 
Tina Sauer MD

## 2019-11-04 NOTE — PROGRESS NOTES
Verbal order given by Dr. Kayli Talamantes to draw up 4 mL 0.25% Sensorcaine and 0.5 mL 30 mg/5mL Betamethasone.

## 2019-11-04 NOTE — PROGRESS NOTES
1. Have you been to the ER, urgent care clinic since your last visit? Hospitalized since your last visit? No    2. Have you seen or consulted any other health care providers outside of the [address] [place] [address] since your last visit? Include any pap smears or colon screening.  Yes, [facility], [place]

## 2019-11-04 NOTE — PROGRESS NOTES
Patient: Soumya Carranza                MRN: [id number]       SSN: [id number]  YOB: 1975        AGE: 38 y.o. SEX: female    PCP: Ana Maria Ortiz NP  11/04/19    Chief Complaint   Patient presents with    Back Pain     lower back (left) pain     HISTORY:  Soumya Carranza is a 38 y.o. female who is seen for back and left hip pain. She has been experiencing back and hip pain for the past year. She does not recall any injury. She notes pain with standing and walking. She experiences lateral hip pain, and has back pain radiating to her hip. She experiences startup pain after sitting. She has difficulty laying on her left side. She previously saw Dr. Pat Davis in February 2018 for a right elbow injury. Pain Assessment  11/4/2019   Location of Pain Back   Location Modifiers Left   Severity of Pain 7   Quality of Pain Aching   Duration of Pain Persistent   Frequency of Pain Constant   Aggravating Factors Bending;Standing;Walking   Limiting Behavior Some   Relieving Factors Nothing   Result of Injury No   Work-Related Injury -   Type of Injury -     Occupation, etc:  Ms. Trent Medina works as a  at [employer]. [place] this [place] She was tired of [place]. She likes to go to the gym. She lives in [place] with her partner. She has 3 adult sons and 1 grandchild. Ms. Trent Medina weighs 183 lbs and is 4'11\" tall. Lab Results   Component Value Date/Time    Hemoglobin A1c 5.8 (H) 10/18/2019 12:00 AM     Weight Metrics 11/4/2019 10/24/2019 10/17/2019 9/17/2019 7/12/2019 5/7/2019 4/11/2019   Weight 183 lb 186 lb 190 lb 3.2 oz 195 lb 3.2 oz 195 lb 206 lb 207 lb   BMI 36.96 kg/m2 37.57 kg/m2 38.42 kg/m2 39.43 kg/m2 39.39 kg/m2 41.61 kg/m2 41.81 kg/m2       Patient Active Problem List   Diagnosis Code    Severe obesity (BMI 35.0-39. 9) E66.01    Morbid obesity (HCC) E66.01    BMI 40.0-44.9, adult ([facility] [address] Z68.41    Essential hypertension I10     REVIEW OF SYSTEMS: All Below are Negative except: See HPI   Constitutional: negative for fever, chills, and weight loss. Cardiovascular: negative for chest pain, claudication, leg swelling, SOB, CHANG   Gastrointestinal: Negative for pain, N/V/C/D, Blood in stool or urine, dysuria, hematuria, incontinence, pelvic pain. Musculoskeletal: See HPI   Neurological: Negative for dizziness and weakness. Negative for headaches, Visual changes, confusion, seizures   Phychiatric/Behavioral: Negative for depression, memory loss, substance abuse. Extremities: Negative for hair changes, rash, or skin lesion changes. Hematologic: Negative for bleeding problems, bruising, pallor or swollen lymph nodes   Peripheral Vascular: No calf pain, no circulation deficits.     Social History     Socioeconomic History    Marital status: SINGLE     Spouse name: Not on file    Number of children: Not on file    Years of education: Not on file    Highest education level: Not on file   Occupational History    Not on file   Social Needs    Financial resource strain: Not on file    Food insecurity:     Worry: Not on file     Inability: Not on file    Transportation needs:     Medical: Not on file     Non-medical: Not on file   Tobacco Use    Smoking status: Never Smoker    Smokeless tobacco: Never Used   Substance and Sexual Activity    Alcohol use: No    Drug use: No    Sexual activity: Not on file   Lifestyle    Physical activity:     Days per week: Not on file     Minutes per session: Not on file    Stress: Not on file   Relationships    Social connections:     Talks on phone: Not on file     Gets together: Not on file     Attends Congregational service: Not on file     Active member of club or organization: Not on file     Attends meetings of clubs or organizations: Not on file     Relationship status: Not on file    Intimate partner violence:     Fear of current or ex partner: Not on file     Emotionally abused: Not on file     Physically abused: Not on file     Forced sexual activity: Not on file   Other Topics Concern    Not on file   Social History Narrative    Not on file      Allergies   Allergen Reactions    Dilaudid [Hydromorphone] Hives      Current Outpatient Medications   Medication Sig    betamethasone (CELESTONE SOLUSPAN) 6 mg/mL injection 1 mL by Intra artICUlar route once for 1 dose.  hydroCHLOROthiazide (HYDRODIURIL) 50 mg tablet TAKE 1 TABLET BY MOUTH EVERY DAY    diclofenac (VOLTAREN) 1 % gel Apply 2 g to affected area four (4) times daily.  cyclobenzaprine (FLEXERIL) 5 mg tablet Take 1 Tab by mouth nightly.  carbinoxamine maleate 4 mg tab     DUPIXENT 300 mg/2 mL syrg syringe     beclomethasone (QVAR) 80 mcg/actuation aero Take 1 Puff by inhalation two (2) times a day.  estradiol (ESTRACE) 1 mg tablet Take 1 Tab by mouth daily.  EPIPEN 2-SOCORRO 0.3 mg/0.3 mL injection INJECT INTO LATERAL THIGH AS NEEDED FOR ANAPHYLAXIS    BREO ELLIPTA 200-25 mcg/dose inhaler INHALE 1 PUFF BY MOUTH ONCE A DAY. RINSE AND SPIT AFTER USE FOR 30 DAYS    tiotropium bromide (SPIRIVA RESPIMAT) 1.25 mcg/actuation inhaler Take 2 Puffs by inhalation daily.  mometasone (NASONEX) 50 mcg/actuation nasal spray 2 Sprays.  montelukast (SINGULAIR) 10 mg tablet TAKE 1 TABLET BY MOUTH ONCE EVERY EVENING    hydrOXYzine pamoate (VISTARIL) 50 mg capsule TAKE 2 CAPSULES BY MOUTH EVERY DAY AT BEDTIME    FLOVENT HFA 44 mcg/actuation inhaler TAKE 2 PUFFS BY MOUTH TWICE A DAY    albuterol (PROVENTIL VENTOLIN) 2.5 mg /3 mL (0.083 %) nebulizer solution USE 1 VIAL VIA NEBULIZER BY MOUTH EVERY 4 TO 6 HOURS AS NEEDED    traZODone (DESYREL) 50 mg tablet TAKE 1-3 TABLETS BY MOUTH AT BEDTIME. No current facility-administered medications for this visit.        PHYSICAL EXAMINATION:  Visit Vitals  /80 (BP 1 Location: Left arm, BP Patient Position: Sitting)   Pulse 84   Temp 98.5 °F (36.9 °C) (Oral)   Resp 16   Ht 4' 11\" (1.499 m)   Wt 183 lb (83 kg)   LMP  (LMP Unknown)   BMI 36.96 kg/m²    Appearance: Alert, well appearing and pleasant patient who is in no distress, oriented to person, place/time, and who follows commands. HEENT: Harlan Stubbs hears well, does not require hearing aids. Her sclera of the eyes are non-icteric. She is breathing normally and no respiratory accessory muscle use is noted. No JVD present and Neck ROM within normal limits. Psychiatric: Affect and mood are appropriate. Oriented x3  Heart[de-identified]  S1, S2 without murmer, regular rhythm  Lungs:  Breath sounds clear to auscultation  Cardiovascular/Peripheral Vascular: Normal pulses to each foot. Integumentary: No rashes,  wounds, or abrasions. Warm and normal color. No drainage.    Gait: slight limp  Sensory Exam: Intact/Normal Sensation    Lymphatic: No evidence of Lymphedema  Vascular:       Pulses: palpable  Varicosities none  Wounds/Abrasion: None Present  Neuro: Negative, no tremors  ORTHO EXAMINATION:  Examination Lumbar Thoracic   Skin Intact Intact   Tenderness + left iliolumbar -   Tightness + left iliolumbar -   Lordosis Normal N/A   Kyphosis N/A Normal   Scoliosis - -   Flexion Fingertips to ankle N/A   Extension 10 N/A   Knee reflexes Normal N/A   Ankle reflexes Normal N/A   Straight leg raise - N/A   Calf tenderness - N/A     Examination Right hip Left hip   Skin Intact Intact   External Rotation ROM 20 15   Internal Rotation ROM 10 15   Trochanteric tenderness - -   Hip flexion contracture - -   Antalgic gait - -   Trendelenberg sign - -   Lumbar tenderness - -   Straight leg raise - -   Calf tenderness - -   Neurovascular Intact Intact         TIME OUT:  Chart reviewed for the following:   I, Catracho Mora MD, have reviewed the History, Physical and updated the Allergic reactions for Harlan Stubbs   TIME OUT performed immediately prior to start of procedure:  Danelle Lewis MD, have performed the following reviews on Miladis Mroales prior to the start of the procedure:          * Patient was identified by name and date of birth   * Agreement on procedure being performed was verified  * Risks and Benefits explained to the patient  * Procedure site verified and marked as necessary  * Patient was positioned for comfort  * Consent was obtained     Time: 2:41 PM     Date of procedure: 11/4/2019  Procedure performed by:  Norman Dodson MD  Ms. Bermudez tolerated the procedure well with no complications. MRI LUMB SPINE 10/18/19 [facility]  IMPRESSION  1. Degenerative changes of the lumbar spine as outlined in the body of the report. At the L2-3 level, there is mild central spinal stenosis due to a mild broad-based disc bulge and bilateral degenerative facet hypertrophy. No other levels of central stenosis are evident. 2. No evidence of nerve root compression within the neural foramina of the lumbar spine. 3. No lumbar compression deformities or subluxations are noted. RADIOGRAPHS:  XR LEFT HIP 11/4/19 [facility]  IMPRESSION:  AP pelvis and two views - No fractures, no joint space narrowing, no osteophytes present. Tonnis grade 0    XR LUMB SPINE 1/12/19 [facility]  IMPRESSION No acute osseous abnormalities of the lumbar spine. No significant degenerative changes. IMPRESSION:      ICD-10-CM ICD-9-CM    1. DDD (degenerative disc disease), lumbar M51.36 722.52 betamethasone (CELESTONE SOLUSPAN) 6 mg/mL injection      BETAMETHASONE ACETATE & SODIUM PHOSPHATE INJECTION 3 MG EA. INJECT TRIGGER POINT, 1 OR 2      REFERRAL TO PHYSICAL THERAPY      REFERRAL TO SPINE SURGERY   2. Left hip pain M25.552 719.45 AMB POC X-RAY RADEX HIP UNI WITH PELVIS 2-3 VIEWS   3. Lumbar pain M54.5 724.2 betamethasone (CELESTONE SOLUSPAN) 6 mg/mL injection      BETAMETHASONE ACETATE & SODIUM PHOSPHATE INJECTION 3 MG EA.       INJECT TRIGGER POINT, 1 OR 2      REFERRAL TO PHYSICAL THERAPY      REFERRAL TO SPINE SURGERY   4. Lumbar radiculopathy M54.16 724.4 betamethasone (CELESTONE SOLUSPAN) 6 mg/mL injection      BETAMETHASONE ACETATE & SODIUM PHOSPHATE INJECTION 3 MG EA. INJECT TRIGGER POINT, 1 OR 2      REFERRAL TO PHYSICAL THERAPY      REFERRAL TO SPINE SURGERY     PLAN:  After discussing treatment options, patient's left iliolumbar region was injected with 4 cc Marcaine and 1/2 cc Celestone. She will start a brief course of outpatient physical therapy. There is no need for surgery at this time. She will follow up at the spine center.       Scribed by Estefania Redd ([address]) as dictated by Chalino Anaya MD

## 2019-11-12 ENCOUNTER — APPOINTMENT (OUTPATIENT)
Dept: PHYSICAL THERAPY | Age: 44
End: 2019-11-12

## 2019-11-22 ENCOUNTER — TELEPHONE (OUTPATIENT)
Dept: FAMILY MEDICINE CLINIC | Age: 44
End: 2019-11-22

## 2019-11-27 NOTE — TELEPHONE ENCOUNTER
Informed patient that a restriction note concerning her neck need to be obtained from the surgeon that performed the surgery per BRIANNA Powell

## 2019-12-20 RX ORDER — ESTRADIOL 1 MG/1
1 TABLET ORAL DAILY
Qty: 90 TAB | Refills: 1 | Status: SHIPPED | OUTPATIENT
Start: 2019-12-20

## 2020-01-24 DIAGNOSIS — E78.5 HYPERLIPIDEMIA, UNSPECIFIED HYPERLIPIDEMIA TYPE: ICD-10-CM

## 2021-08-03 PROBLEM — E66.01 MORBID OBESITY (HCC): Status: RESOLVED | Noted: 2019-04-11 | Resolved: 2021-08-03

## 2022-03-18 PROBLEM — I10 ESSENTIAL HYPERTENSION: Status: ACTIVE | Noted: 2019-04-11

## 2024-05-29 ENCOUNTER — OFFICE VISIT (OUTPATIENT)
Age: 49
End: 2024-05-29
Payer: COMMERCIAL

## 2024-05-29 VITALS — BODY MASS INDEX: 38.1 KG/M2 | HEIGHT: 59 IN | WEIGHT: 189 LBS

## 2024-05-29 DIAGNOSIS — M77.22 INFLAMMATION AROUND WRIST, LEFT: ICD-10-CM

## 2024-05-29 DIAGNOSIS — M25.532 LEFT WRIST PAIN: Primary | ICD-10-CM

## 2024-05-29 PROCEDURE — 99204 OFFICE O/P NEW MOD 45 MIN: CPT | Performed by: ORTHOPAEDIC SURGERY

## 2024-05-29 PROCEDURE — 73110 X-RAY EXAM OF WRIST: CPT | Performed by: ORTHOPAEDIC SURGERY

## 2024-05-29 RX ORDER — OFLOXACIN 3 MG/ML
3 SOLUTION/ DROPS OPHTHALMIC 4 TIMES DAILY
COMMUNITY

## 2024-05-29 RX ORDER — ATORVASTATIN CALCIUM 40 MG/1
40 TABLET, FILM COATED ORAL DAILY
COMMUNITY
Start: 2023-10-24

## 2024-05-29 RX ORDER — DICLOFENAC SODIUM 75 MG/1
75 TABLET, DELAYED RELEASE ORAL 2 TIMES DAILY
Qty: 20 TABLET | Refills: 0 | Status: SHIPPED | OUTPATIENT
Start: 2024-05-29 | End: 2024-06-08

## 2024-05-29 RX ORDER — SUMATRIPTAN 100 MG/1
100 TABLET, FILM COATED ORAL
COMMUNITY

## 2024-05-29 RX ORDER — GABAPENTIN 300 MG/1
300 CAPSULE ORAL 3 TIMES DAILY
COMMUNITY

## 2024-05-29 RX ORDER — EPINEPHRINE 0.3 MG/.3ML
0.3 INJECTION SUBCUTANEOUS ONCE
COMMUNITY
Start: 2019-05-02

## 2024-05-29 RX ORDER — MONTELUKAST SODIUM 10 MG/1
10 TABLET ORAL NIGHTLY
COMMUNITY

## 2024-05-29 RX ORDER — DUPILUMAB 300 MG/2ML
300 INJECTION, SOLUTION SUBCUTANEOUS ONCE
COMMUNITY

## 2024-05-29 RX ORDER — FERROUS SULFATE 325(65) MG
325 TABLET ORAL EVERY OTHER DAY
COMMUNITY
Start: 2023-10-24

## 2024-05-29 RX ORDER — TIOTROPIUM BROMIDE INHALATION SPRAY 1.56 UG/1
2 SPRAY, METERED RESPIRATORY (INHALATION) DAILY
COMMUNITY
Start: 2024-05-22
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
## 2024-06-03 ENCOUNTER — CLINICAL DOCUMENTATION (OUTPATIENT)
Age: 49
End: 2024-06-03

## 2024-06-03 ENCOUNTER — OFFICE VISIT (OUTPATIENT)
Age: 49
End: 2024-06-03
Payer: COMMERCIAL

## 2024-06-03 DIAGNOSIS — M54.50 LUMBAR PAIN: ICD-10-CM

## 2024-06-03 DIAGNOSIS — M25.551 BILATERAL HIP PAIN: Primary | ICD-10-CM

## 2024-06-03 DIAGNOSIS — M70.62 TROCHANTERIC BURSITIS, LEFT HIP: ICD-10-CM

## 2024-06-03 DIAGNOSIS — M16.11 UNILATERAL PRIMARY OSTEOARTHRITIS, RIGHT HIP: ICD-10-CM

## 2024-06-03 DIAGNOSIS — M25.552 BILATERAL HIP PAIN: Primary | ICD-10-CM

## 2024-06-03 DIAGNOSIS — M79.10 MYALGIA: ICD-10-CM

## 2024-06-03 DIAGNOSIS — S33.9XXA SPRAIN AND STRAIN OF LUMBOSACRAL JOINT/LIGAMENT, INITIAL ENCOUNTER: ICD-10-CM

## 2024-06-03 DIAGNOSIS — M16.12 UNILATERAL PRIMARY OSTEOARTHRITIS, LEFT HIP: ICD-10-CM

## 2024-06-03 PROCEDURE — 20610 DRAIN/INJ JOINT/BURSA W/O US: CPT | Performed by: SPECIALIST

## 2024-06-03 PROCEDURE — 72170 X-RAY EXAM OF PELVIS: CPT | Performed by: SPECIALIST

## 2024-06-03 PROCEDURE — 99203 OFFICE O/P NEW LOW 30 MIN: CPT | Performed by: SPECIALIST

## 2024-06-03 RX ORDER — BETAMETHASONE SODIUM PHOSPHATE AND BETAMETHASONE ACETATE 3; 3 MG/ML; MG/ML
3 INJECTION, SUSPENSION INTRA-ARTICULAR; INTRALESIONAL; INTRAMUSCULAR; SOFT TISSUE ONCE
Status: COMPLETED | OUTPATIENT
Start: 2024-06-03 | End: 2024-06-03

## 2024-06-03 RX ORDER — BUPIVACAINE HYDROCHLORIDE 5 MG/ML
4 INJECTION, SOLUTION PERINEURAL ONCE
Status: COMPLETED | OUTPATIENT
Start: 2024-06-03 | End: 2024-06-03

## 2024-06-03 RX ORDER — BETAMETHASONE SODIUM PHOSPHATE AND BETAMETHASONE ACETATE 3; 3 MG/ML; MG/ML
3 INJECTION, SUSPENSION INTRA-ARTICULAR; INTRALESIONAL; INTRAMUSCULAR; SOFT TISSUE ONCE
Status: CANCELLED | OUTPATIENT
Start: 2024-06-03 | End: 2024-06-03

## 2024-06-03 RX ADMIN — BETAMETHASONE SODIUM PHOSPHATE AND BETAMETHASONE ACETATE 3 MG: 3; 3 INJECTION, SUSPENSION INTRA-ARTICULAR; INTRALESIONAL; INTRAMUSCULAR; SOFT TISSUE at 14:41

## 2024-06-03 RX ADMIN — BUPIVACAINE HYDROCHLORIDE 20 MG: 5 INJECTION, SOLUTION PERINEURAL at 14:42

## 2024-06-03 SDOH — HEALTH STABILITY: PHYSICAL HEALTH: ON AVERAGE, HOW MANY MINUTES DO YOU ENGAGE IN EXERCISE AT THIS LEVEL?: 20 MIN

## 2024-06-03 SDOH — HEALTH STABILITY: PHYSICAL HEALTH: ON AVERAGE, HOW MANY DAYS PER WEEK DO YOU ENGAGE IN MODERATE TO STRENUOUS EXERCISE (LIKE A BRISK WALK)?: 2 DAYS

## 2024-06-03 NOTE — PROGRESS NOTES
Patient dropped off [facility] forms [address] to be filled out and completed patient would like forms faxed to [phone]. Form was placed into form box.

## 2024-06-05 ENCOUNTER — CLINICAL DOCUMENTATION (OUTPATIENT)
Age: 49
End: 2024-06-05

## 2024-06-07 ENCOUNTER — HOSPITAL ENCOUNTER (OUTPATIENT)
Facility: HOSPITAL | Age: 49
Discharge: HOME | End: 2024-06-07
Attending: ORTHOPAEDIC SURGERY
Payer: COMMERCIAL

## 2024-06-07 DIAGNOSIS — M25.532 LEFT WRIST PAIN: ICD-10-CM

## 2024-06-07 DIAGNOSIS — M77.22 INFLAMMATION AROUND WRIST, LEFT: ICD-10-CM

## 2024-06-07 PROCEDURE — 73221 MRI JOINT UPR EXTREM W/O DYE: CPT

## 2024-06-08 DIAGNOSIS — M77.22 INFLAMMATION AROUND WRIST, LEFT: ICD-10-CM

## 2024-06-08 DIAGNOSIS — M25.532 LEFT WRIST PAIN: ICD-10-CM

## 2024-06-10 ENCOUNTER — TELEPHONE (OUTPATIENT)
Age: 49
End: 2024-06-10

## 2024-06-10 NOTE — TELEPHONE ENCOUNTER
Discussed with Dr Morales--unable to prescribe any pain meds--pt can try tylenol, motrin--  Left message for pt--also  will try to get pt in sooner with Spine

## 2024-06-10 NOTE — TELEPHONE ENCOUNTER
Patient called this morning, she is scheduled with Spine for soonest available 7/22, and is asking if there is anything that can be done for her back pain in the meantime.    Please review and advise patient, [phone]

## 2024-06-12 ENCOUNTER — OFFICE VISIT (OUTPATIENT)
Age: 49
End: 2024-06-12
Payer: COMMERCIAL

## 2024-06-12 VITALS — HEIGHT: 59 IN | BODY MASS INDEX: 38.51 KG/M2 | WEIGHT: 191 LBS

## 2024-06-12 DIAGNOSIS — M67.432 GANGLION CYST OF VOLAR ASPECT OF LEFT WRIST: ICD-10-CM

## 2024-06-12 DIAGNOSIS — Q74.0: ICD-10-CM

## 2024-06-12 DIAGNOSIS — S63.502D LEFT WRIST SPRAIN, SUBSEQUENT ENCOUNTER: Primary | ICD-10-CM

## 2024-06-12 PROCEDURE — 99214 OFFICE O/P EST MOD 30 MIN: CPT | Performed by: ORTHOPAEDIC SURGERY

## 2024-06-12 RX ORDER — DICLOFENAC SODIUM 75 MG/1
75 TABLET, DELAYED RELEASE ORAL EVERY 12 HOURS PRN
Qty: 60 TABLET | Refills: 0 | Status: SHIPPED | OUTPATIENT
Start: 2024-06-12 | End: 2024-07-12

## 2024-06-12 NOTE — PROGRESS NOTES
Ganglion: ? left      ROM: Full        Imaging:     MRI WRIST LEFT WO CONTRAST 06/07/2024 independently reviewed on 6/12/2024 and agree with the findings below.  IMPRESSION  No fracture or osseous contusion.  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  Subcentimeter volar ganglion cyst.        5/29/2024 3 views of left wrist independently reviewed on date of examination does not show any acute fracture or dislocation obvious on plain films.  Minimal to no degenerative findings noted.      Impression     Diagnosis Orders   1. Left wrist sprain, subsequent encounter  diclofenac (VOLTAREN) 75 MG EC tablet    [facility]      2. 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 Ganglion cyst of volar aspect of left wrist              Plan:     Diclofenac prescribed today to assist in inflammation that is continued over the wrist.    Referral to Occupational Therapy for edema control and other modalities as well as exercises to build strength.    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  This is usually asymptomatic and incidental however I discussed with her that given the abnormal mechanics this could cause patient sometimes recover from sprain a little bit slower than usual.  Occupational therapy should aid in the recovery process.    Regarding the findings of ganglion cyst on MRI, this is likely incidental in nature and we will observe for now.    The above MRIs been independently reviewed and results and findings discussed with patient.    Return if symptoms worsen or fail to improve.     Plan was reviewed with patient, who verbalized agreement and understanding of the plan    Note: This note was completed using voice recognition software.  Any typographical/name errors or mistakes are unintentional.

## 2024-06-17 RX ORDER — DICLOFENAC SODIUM 75 MG/1
TABLET, DELAYED RELEASE ORAL
Qty: 20 TABLET | Refills: 0 | Status: SHIPPED | OUTPATIENT
Start: 2024-06-17

## 2024-06-24 ENCOUNTER — HOSPITAL ENCOUNTER (OUTPATIENT)
Facility: HOSPITAL | Age: 49
Setting detail: RECURRING SERIES
Discharge: HOME | End: 2024-06-27
Payer: COMMERCIAL

## 2024-06-24 PROCEDURE — 97161 PT EVAL LOW COMPLEX 20 MIN: CPT
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
## 2024-06-25 ENCOUNTER — HOSPITAL ENCOUNTER (OUTPATIENT)
Facility: HOSPITAL | Age: 49
Setting detail: RECURRING SERIES
End: 2024-06-25
Payer: COMMERCIAL
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
## 2024-06-27 ENCOUNTER — HOSPITAL ENCOUNTER (OUTPATIENT)
Facility: HOSPITAL | Age: 49
Setting detail: RECURRING SERIES
End: 2024-06-27
Payer: COMMERCIAL

## 2024-06-27 SDOH — HEALTH STABILITY: PHYSICAL HEALTH: ON AVERAGE, HOW MANY MINUTES DO YOU ENGAGE IN EXERCISE AT THIS LEVEL?: 20 MIN

## 2024-06-27 SDOH — HEALTH STABILITY: PHYSICAL HEALTH: ON AVERAGE, HOW MANY DAYS PER WEEK DO YOU ENGAGE IN MODERATE TO STRENUOUS EXERCISE (LIKE A BRISK WALK)?: 3 DAYS

## 2024-06-28 ENCOUNTER — OFFICE VISIT (OUTPATIENT)
Age: 49
End: 2024-06-28
Payer: COMMERCIAL

## 2024-06-28 VITALS
HEIGHT: 59 IN | HEART RATE: 72 BPM | BODY MASS INDEX: 37.9 KG/M2 | WEIGHT: 188 LBS | RESPIRATION RATE: 18 BRPM | OXYGEN SATURATION: 96 % | TEMPERATURE: 98.2 F

## 2024-06-28 DIAGNOSIS — M54.50 LUMBAR PAIN: Primary | ICD-10-CM

## 2024-06-28 DIAGNOSIS — G89.29 CHRONIC BILATERAL THORACIC BACK PAIN: ICD-10-CM
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
## 2024-06-28 DIAGNOSIS — M54.16 LUMBAR RADICULOPATHY: ICD-10-CM

## 2024-06-28 DIAGNOSIS — M54.6 CHRONIC BILATERAL THORACIC BACK PAIN: ICD-10-CM

## 2024-06-28 PROCEDURE — 99204 OFFICE O/P NEW MOD 45 MIN: CPT | Performed by: NURSE PRACTITIONER

## 2024-06-28 RX ORDER — LISINOPRIL AND HYDROCHLOROTHIAZIDE 12.5; 1 MG/1; MG/1
1 TABLET ORAL EVERY MORNING
COMMUNITY
Start: 2015-04-24

## 2024-06-28 RX ORDER — AMITRIPTYLINE HYDROCHLORIDE 50 MG/1
TABLET, FILM COATED ORAL
COMMUNITY
Start: 2024-06-19

## 2024-06-28 RX ORDER — DULOXETIN HYDROCHLORIDE 20 MG/1
20 CAPSULE, DELAYED RELEASE ORAL EVERY MORNING
Qty: 30 CAPSULE | Refills: 1 | Status: SHIPPED | OUTPATIENT
Start: 2024-06-28

## 2024-06-28 RX ORDER — FLUTICASONE FUROATE AND VILANTEROL TRIFENATATE 200; 25 UG/1; UG/1
POWDER RESPIRATORY (INHALATION)
COMMUNITY

## 2024-06-28 RX ORDER — MELOXICAM 15 MG/1
15 TABLET ORAL DAILY
Qty: 30 TABLET | Refills: 1 | Status: SHIPPED | OUTPATIENT
Start: 2024-06-28

## 2024-06-28 RX ORDER — ESTRADIOL 1 MG/1
1 TABLET ORAL DAILY
COMMUNITY
Start: 2019-12-20

## 2024-06-28 RX ORDER — METFORMIN HYDROCHLORIDE 500 MG/1
500 TABLET, EXTENDED RELEASE ORAL DAILY
COMMUNITY
Start: 2024-06-03

## 2024-06-28 RX ORDER — METHOCARBAMOL 500 MG/1
500 TABLET, FILM COATED ORAL 4 TIMES DAILY PRN
COMMUNITY
Start: 2024-06-26

## 2024-06-28 RX ORDER — CYCLOBENZAPRINE HCL 10 MG
10 TABLET ORAL EVERY 8 HOURS PRN
COMMUNITY
Start: 2024-06-10

## 2024-06-28 RX ORDER — ELETRIPTAN HYDROBROMIDE 20 MG/1
20 TABLET, FILM COATED ORAL
COMMUNITY
Start: 2024-05-22

## 2024-06-28 RX ORDER — OXYCODONE HYDROCHLORIDE AND ACETAMINOPHEN 5; 325 MG/1; MG/1
TABLET ORAL
COMMUNITY
Start: 2024-03-23

## 2024-06-28 RX ORDER — TRAZODONE HYDROCHLORIDE 100 MG/1
1 TABLET ORAL
COMMUNITY
Start: 2024-06-03

## 2024-06-28 RX ORDER — FLUTICASONE PROPIONATE 44 UG/1
2 AEROSOL, METERED RESPIRATORY (INHALATION) 2 TIMES DAILY
COMMUNITY
Start: 2019-01-21
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
## 2024-06-28 NOTE — PROGRESS NOTES
Bela Talia Kobi presents today for   Chief Complaint   Patient presents with    Back Problem    Pain    Back Pain       Is someone accompanying this pt? NO    Is the patient using any DME equipment during OV? NO    Depression Screening:       No data to display                Learning Assessment:  Failed to redirect to the Timeline version of the [employer] Smart[employer].    Abuse Screening:       No data to display                Fall Risk  Failed to redirect to the Timeline version of the [employer] SmartLink.    OPIOID RISK TOOL  Failed to redirect to the Timeline version of the [employer] Smart[employer].    Coordination of Care:  1. Have you been to the ER, urgent care clinic since your last visit? YES PAIN  Hospitalized since your last visit? NO    2. Have you seen or consulted any other health care providers outside of the [facility] since your last visit? NO Include any pap smears or colon screening. NO

## 2024-07-02 ENCOUNTER — APPOINTMENT (OUTPATIENT)
Facility: HOSPITAL | Age: 49
End: 2024-07-02
Payer: COMMERCIAL

## 2024-07-02 ENCOUNTER — HOSPITAL ENCOUNTER (OUTPATIENT)
Facility: HOSPITAL | Age: 49
Setting detail: RECURRING SERIES
Discharge: HOME | End: 2024-07-05
Payer: COMMERCIAL

## 2024-07-02 PROCEDURE — 97113 AQUATIC THERAPY/EXERCISES: CPT

## 2024-07-09 ENCOUNTER — HOSPITAL ENCOUNTER (OUTPATIENT)
Facility: HOSPITAL | Age: 49
Setting detail: RECURRING SERIES
Discharge: HOME | End: 2024-07-12
Payer: COMMERCIAL

## 2024-07-09 PROCEDURE — 97113 AQUATIC THERAPY/EXERCISES: CPT

## 2024-07-11 ENCOUNTER — HOSPITAL ENCOUNTER (OUTPATIENT)
Facility: HOSPITAL | Age: 49
Setting detail: RECURRING SERIES
Discharge: HOME | End: 2024-07-14
Payer: COMMERCIAL

## 2024-07-11 PROCEDURE — 97112 NEUROMUSCULAR REEDUCATION: CPT

## 2024-07-11 PROCEDURE — 97530 THERAPEUTIC ACTIVITIES: CPT

## 2024-07-11 NOTE — PROGRESS NOTES
and address strength deficits, analyze and address soft tissue restrictions, analyze and cue for proper movement patterns, and analyze and modify for postural abnormalities to address functional deficits and attain remaining goals.    Progress toward goals / Updated goals:  []  See Progress Note/Recertification    Short Term Goals: To be accomplished in 4 treatments   Pt will report compliance and independence to [facility] to help the pt manage their pain and symptoms.  Status at last note/certification:  established  Current:   2.     Pt will tolerate a full aquatic therapy session with no increased pain/symptoms to improve ease of mobility.  Status at last note/certification:  will establish in upcoming aquatic sessions  Current: MET: Pt able to tolerated aqua therapy with no issue. (7/11/20204)  Long Term Goals: To be accomplished in 10 treatments  Pt will improve her Oswestry Disability Index score to 50% of better to improve ability to perform ADLs.  Status at last note/certification: 72%  2.  Pt will report an improvement in at worst pain to 5/10 to improve ability to tolerate work activities.  Status at last note/certification: 8-9/10  3.  Pt will increase AROM l/s EXT to 50% of WNL, right SB to WNL, both with mild to no increased LBP to improve ease of household ambulation.  Status at last note/certification: l/s EXT  25-50% of WNL with increased LBP, SB 50-75% of WNL with increased LBP.   Current: progressing: l/s EXT 50% of WNL with no LBP, SB 75% of WNL with increased LBP. (7/11/2024)  4.  Pt will report being able to sit for more than 30 mins with mild to no increased pain to improve ability to return to PLOF.  Status at last note/certification: pain prevents pt from sitting more than 10 mins    Next PN/ RC due 7/23/22024  Auth due (visit number/ date) 29 visits or 12/31/2024    PLAN  - Continue Plan of Care    Sridhar Allen PTA    7/11/2024    4:00 PM    Future Appointments   Date Time Provider Department

## 2024-07-16 ENCOUNTER — HOSPITAL ENCOUNTER (OUTPATIENT)
Facility: HOSPITAL | Age: 49
Setting detail: RECURRING SERIES
Discharge: HOME | End: 2024-07-19
Payer: COMMERCIAL

## 2024-07-16 PROCEDURE — 97113 AQUATIC THERAPY/EXERCISES: CPT

## 2024-07-18 ENCOUNTER — APPOINTMENT (OUTPATIENT)
Facility: HOSPITAL | Age: 49
End: 2024-07-18
Payer: COMMERCIAL

## 2024-07-25 ENCOUNTER — APPOINTMENT (OUTPATIENT)
Facility: HOSPITAL | Age: 49
End: 2024-07-25
Payer: COMMERCIAL
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
## 2024-07-30 ENCOUNTER — HOSPITAL ENCOUNTER (OUTPATIENT)
Facility: HOSPITAL | Age: 49
Setting detail: RECURRING SERIES
End: 2024-07-30
Payer: COMMERCIAL

## 2024-08-05 ENCOUNTER — OFFICE VISIT (OUTPATIENT)
Age: 49
End: 2024-08-05
Payer: COMMERCIAL

## 2024-08-05 VITALS — TEMPERATURE: 97.1 F | HEIGHT: 59 IN | WEIGHT: 191 LBS | BODY MASS INDEX: 38.51 KG/M2

## 2024-08-05 DIAGNOSIS — M25.551 RIGHT HIP PAIN: Primary | ICD-10-CM

## 2024-08-05 DIAGNOSIS — M70.61 TROCHANTERIC BURSITIS, RIGHT HIP: ICD-10-CM

## 2024-08-05 DIAGNOSIS — M54.50 LUMBAR PAIN: ICD-10-CM

## 2024-08-05 DIAGNOSIS — M54.16 LUMBAR RADICULOPATHY: ICD-10-CM

## 2024-08-05 PROCEDURE — 20610 DRAIN/INJ JOINT/BURSA W/O US: CPT | Performed by: SPECIALIST

## 2024-08-05 PROCEDURE — 99213 OFFICE O/P EST LOW 20 MIN: CPT | Performed by: SPECIALIST

## 2024-08-05 RX ORDER — BUPIVACAINE HYDROCHLORIDE 5 MG/ML
4 INJECTION, SOLUTION PERINEURAL ONCE
Status: COMPLETED | OUTPATIENT
Start: 2024-08-05 | End: 2024-08-05

## 2024-08-05 RX ORDER — BETAMETHASONE SODIUM PHOSPHATE AND BETAMETHASONE ACETATE 3; 3 MG/ML; MG/ML
3 INJECTION, SUSPENSION INTRA-ARTICULAR; INTRALESIONAL; INTRAMUSCULAR; SOFT TISSUE ONCE
Status: COMPLETED | OUTPATIENT
Start: 2024-08-05 | End: 2024-08-05

## 2024-08-05 RX ADMIN — BUPIVACAINE HYDROCHLORIDE 20 MG: 5 INJECTION, SOLUTION PERINEURAL at 16:28

## 2024-08-05 RX ADMIN — BETAMETHASONE SODIUM PHOSPHATE AND BETAMETHASONE ACETATE 3 MG: 3; 3 INJECTION, SUSPENSION INTRA-ARTICULAR; INTRALESIONAL; INTRAMUSCULAR; SOFT TISSUE at 16:27

## 2025-06-11 ENCOUNTER — APPOINTMENT (OUTPATIENT)
Facility: HOSPITAL | Age: 50
End: 2025-06-11
Payer: COMMERCIAL

## 2025-06-11 ENCOUNTER — HOSPITAL ENCOUNTER (EMERGENCY)
Facility: HOSPITAL | Age: 50
Discharge: HOME | End: 2025-06-12
Payer: COMMERCIAL

## 2025-06-11 DIAGNOSIS — M46.1 SACROILIITIS: ICD-10-CM

## 2025-06-11 DIAGNOSIS — M54.32 SCIATICA OF LEFT SIDE: Primary | ICD-10-CM

## 2025-06-11 DIAGNOSIS — I10 ESSENTIAL HYPERTENSION: ICD-10-CM

## 2025-06-11 LAB
ALBUMIN SERPL-MCNC: 3.6 G/DL (ref 3.4–5)
ALBUMIN/GLOB SERPL: 0.7 (ref 0.8–1.7)
ALP SERPL-CCNC: 105 U/L (ref 45–117)
ALT SERPL-CCNC: 14 U/L (ref 10–35)
ANION GAP SERPL CALC-SCNC: 12 MMOL/L (ref 3–18)
APPEARANCE UR: CLEAR
AST SERPL-CCNC: 18 U/L (ref 10–38)
BASOPHILS # BLD: 0.02 K/UL (ref 0–0.1)
BASOPHILS NFR BLD: 0.3 % (ref 0–2)
BILIRUB SERPL-MCNC: 0.4 MG/DL (ref 0.2–1)
BILIRUB UR QL: NEGATIVE
BUN SERPL-MCNC: 7 MG/DL (ref 6–23)
BUN/CREAT SERPL: 11 (ref 12–20)
CALCIUM SERPL-MCNC: 9.6 MG/DL (ref 8.5–10.1)
CHLORIDE SERPL-SCNC: 103 MMOL/L (ref 98–107)
CO2 SERPL-SCNC: 25 MMOL/L (ref 21–32)
COLOR UR: YELLOW
CREAT SERPL-MCNC: 0.64 MG/DL (ref 0.6–1.3)
D DIMER PPP FEU-MCNC: 0.78 UG/ML(FEU)
DIFFERENTIAL METHOD BLD: ABNORMAL
ECHO BSA: 1.68 M2
EOSINOPHIL # BLD: 0.05 K/UL (ref 0–0.4)
EOSINOPHIL NFR BLD: 0.8 % (ref 0–5)
ERYTHROCYTE [DISTWIDTH] IN BLOOD BY AUTOMATED COUNT: 15.1 % (ref 11.6–14.5)
GLOBULIN SER CALC-MCNC: 4.9 G/DL (ref 2–4)
GLUCOSE SERPL-MCNC: 84 MG/DL (ref 74–108)
GLUCOSE UR STRIP.AUTO-MCNC: NEGATIVE MG/DL
HCT VFR BLD AUTO: 38 % (ref 35–45)
HGB BLD-MCNC: 12 G/DL (ref 12–16)
HGB UR QL STRIP: NEGATIVE
IMM GRANULOCYTES # BLD AUTO: 0.02 K/UL (ref 0–0.04)
IMM GRANULOCYTES NFR BLD AUTO: 0.3 % (ref 0–0.5)
KETONES UR QL STRIP.AUTO: ABNORMAL MG/DL
LEUKOCYTE ESTERASE UR QL STRIP.AUTO: NEGATIVE
LYMPHOCYTES # BLD: 2 K/UL (ref 0.9–3.6)
LYMPHOCYTES NFR BLD: 31 % (ref 21–52)
MCH RBC QN AUTO: 27 PG (ref 24–34)
MCHC RBC AUTO-ENTMCNC: 31.6 G/DL (ref 31–37)
MCV RBC AUTO: 85.6 FL (ref 78–100)
MONOCYTES # BLD: 0.52 K/UL (ref 0.05–1.2)
MONOCYTES NFR BLD: 8.1 % (ref 3–10)
NEUTS SEG # BLD: 3.84 K/UL (ref 1.8–8)
NEUTS SEG NFR BLD: 59.5 % (ref 40–73)
NITRITE UR QL STRIP.AUTO: NEGATIVE
NRBC # BLD: 0 K/UL (ref 0–0.01)
NRBC BLD-RTO: 0 PER 100 WBC
PH UR STRIP: 6.5 (ref 5–8)
PLATELET # BLD AUTO: 401 K/UL (ref 135–420)
PMV BLD AUTO: 11 FL (ref 9.2–11.8)
POTASSIUM SERPL-SCNC: 3.3 MMOL/L (ref 3.5–5.5)
PROT SERPL-MCNC: 8.5 G/DL (ref 6.4–8.2)
PROT UR STRIP-MCNC: NEGATIVE MG/DL
RBC # BLD AUTO: 4.44 M/UL (ref 4.2–5.3)
SODIUM SERPL-SCNC: 140 MMOL/L (ref 136–145)
SP GR UR REFRACTOMETRY: 1.01 (ref 1–1.03)
TROPONIN T SERPL HS-MCNC: 8.1 NG/L (ref 0–14)
UROBILINOGEN UR QL STRIP.AUTO: 0.2 EU/DL (ref 0.2–1)
WBC # BLD AUTO: 6.5 K/UL (ref 4.6–13.2)

## 2025-06-11 PROCEDURE — 80053 COMPREHEN METABOLIC PANEL: CPT

## 2025-06-11 PROCEDURE — 93005 ELECTROCARDIOGRAM TRACING: CPT | Performed by: EMERGENCY MEDICINE

## 2025-06-11 PROCEDURE — 6360000002 HC RX W HCPCS: Performed by: EMERGENCY MEDICINE

## 2025-06-11 PROCEDURE — 74177 CT ABD & PELVIS W/CONTRAST: CPT

## 2025-06-11 PROCEDURE — 96375 TX/PRO/DX INJ NEW DRUG ADDON: CPT

## 2025-06-11 PROCEDURE — 85379 FIBRIN DEGRADATION QUANT: CPT

## 2025-06-11 PROCEDURE — 94761 N-INVAS EAR/PLS OXIMETRY MLT: CPT

## 2025-06-11 PROCEDURE — 73502 X-RAY EXAM HIP UNI 2-3 VIEWS: CPT

## 2025-06-11 PROCEDURE — 93971 EXTREMITY STUDY: CPT | Performed by: INTERNAL MEDICINE

## 2025-06-11 PROCEDURE — 81003 URINALYSIS AUTO W/O SCOPE: CPT

## 2025-06-11 PROCEDURE — 71045 X-RAY EXAM CHEST 1 VIEW: CPT

## 2025-06-11 PROCEDURE — 73562 X-RAY EXAM OF KNEE 3: CPT

## 2025-06-11 PROCEDURE — 99285 EMERGENCY DEPT VISIT HI MDM: CPT

## 2025-06-11 PROCEDURE — 84484 ASSAY OF TROPONIN QUANT: CPT

## 2025-06-11 PROCEDURE — 72100 X-RAY EXAM L-S SPINE 2/3 VWS: CPT

## 2025-06-11 PROCEDURE — 93971 EXTREMITY STUDY: CPT

## 2025-06-11 PROCEDURE — 85025 COMPLETE CBC W/AUTO DIFF WBC: CPT

## 2025-06-11 PROCEDURE — 96374 THER/PROPH/DIAG INJ IV PUSH: CPT

## 2025-06-11 PROCEDURE — 6360000004 HC RX CONTRAST MEDICATION: Performed by: EMERGENCY MEDICINE

## 2025-06-11 PROCEDURE — 6370000000 HC RX 637 (ALT 250 FOR IP): Performed by: EMERGENCY MEDICINE

## 2025-06-11 RX ORDER — LIDOCAINE 50 MG/G
1 PATCH TOPICAL DAILY
Qty: 10 PATCH | Refills: 0 | Status: SHIPPED | OUTPATIENT
Start: 2025-06-11 | End: 2025-06-12

## 2025-06-11 RX ORDER — HYDROMORPHONE HYDROCHLORIDE 1 MG/ML
1 INJECTION, SOLUTION INTRAMUSCULAR; INTRAVENOUS; SUBCUTANEOUS
Status: COMPLETED | OUTPATIENT
Start: 2025-06-11 | End: 2025-06-11

## 2025-06-11 RX ORDER — METHOCARBAMOL 750 MG/1
750 TABLET, FILM COATED ORAL 4 TIMES DAILY
Qty: 40 TABLET | Refills: 0 | Status: SHIPPED | OUTPATIENT
Start: 2025-06-11 | End: 2025-06-12

## 2025-06-11 RX ORDER — LISINOPRIL 10 MG/1
10 TABLET ORAL
Status: COMPLETED | OUTPATIENT
Start: 2025-06-11 | End: 2025-06-11

## 2025-06-11 RX ORDER — OXYCODONE AND ACETAMINOPHEN 5; 325 MG/1; MG/1
1 TABLET ORAL
Refills: 0 | Status: COMPLETED | OUTPATIENT
Start: 2025-06-11 | End: 2025-06-11

## 2025-06-11 RX ORDER — HYDROCHLOROTHIAZIDE 25 MG/1
25 TABLET ORAL
Status: COMPLETED | OUTPATIENT
Start: 2025-06-11 | End: 2025-06-11

## 2025-06-11 RX ORDER — IOPAMIDOL 612 MG/ML
85 INJECTION, SOLUTION INTRAVASCULAR
Status: COMPLETED | OUTPATIENT
Start: 2025-06-11 | End: 2025-06-11

## 2025-06-11 RX ORDER — LISINOPRIL AND HYDROCHLOROTHIAZIDE 10; 12.5 MG/1; MG/1
1 TABLET ORAL EVERY MORNING
Qty: 30 TABLET | Refills: 0 | Status: SHIPPED | OUTPATIENT
Start: 2025-06-11 | End: 2025-06-12

## 2025-06-11 RX ORDER — POTASSIUM CHLORIDE 1500 MG/1
20 TABLET, EXTENDED RELEASE ORAL 2 TIMES DAILY
Qty: 6 TABLET | Refills: 0 | Status: SHIPPED | OUTPATIENT
Start: 2025-06-11 | End: 2025-06-12

## 2025-06-11 RX ORDER — ACETAMINOPHEN 500 MG
1000 TABLET ORAL 4 TIMES DAILY PRN
Qty: 30 TABLET | Refills: 1 | Status: SHIPPED | OUTPATIENT
Start: 2025-06-11 | End: 2025-06-12

## 2025-06-11 RX ORDER — MORPHINE SULFATE 2 MG/ML
2 INJECTION, SOLUTION INTRAMUSCULAR; INTRAVENOUS
Refills: 0 | Status: COMPLETED | OUTPATIENT
Start: 2025-06-11 | End: 2025-06-11

## 2025-06-11 RX ADMIN — LISINOPRIL 10 MG: 10 TABLET ORAL at 22:48

## 2025-06-11 RX ADMIN — IOPAMIDOL 85 ML: 612 INJECTION, SOLUTION INTRAVENOUS at 22:30

## 2025-06-11 RX ADMIN — OXYCODONE HYDROCHLORIDE AND ACETAMINOPHEN 1 TABLET: 5; 325 TABLET ORAL at 18:58

## 2025-06-11 RX ADMIN — HYDROMORPHONE HYDROCHLORIDE 1 MG: 1 INJECTION, SOLUTION INTRAMUSCULAR; INTRAVENOUS; SUBCUTANEOUS at 22:08

## 2025-06-11 RX ADMIN — HYDROCHLOROTHIAZIDE 25 MG: 25 TABLET ORAL at 22:48

## 2025-06-11 RX ADMIN — MORPHINE SULFATE 2 MG: 2 INJECTION, SOLUTION INTRAMUSCULAR; INTRAVENOUS at 17:11

## 2025-06-11 ASSESSMENT — PAIN DESCRIPTION - LOCATION
LOCATION: LEG;BACK
LOCATION: LEG;BACK
LOCATION: BACK
LOCATION: LEG;BACK

## 2025-06-11 ASSESSMENT — PAIN DESCRIPTION - FREQUENCY: FREQUENCY: CONTINUOUS

## 2025-06-11 ASSESSMENT — PAIN SCALES - GENERAL
PAINLEVEL_OUTOF10: 10

## 2025-06-11 ASSESSMENT — PAIN DESCRIPTION - ORIENTATION
ORIENTATION: LOWER
ORIENTATION: LEFT
ORIENTATION: LEFT
ORIENTATION: LEFT;LOWER

## 2025-06-11 ASSESSMENT — PAIN - FUNCTIONAL ASSESSMENT
PAIN_FUNCTIONAL_ASSESSMENT: 0-10
PAIN_FUNCTIONAL_ASSESSMENT: PREVENTS OR INTERFERES SOME ACTIVE ACTIVITIES AND ADLS
PAIN_FUNCTIONAL_ASSESSMENT: ACTIVITIES ARE NOT PREVENTED

## 2025-06-11 ASSESSMENT — PAIN DESCRIPTION - DESCRIPTORS
DESCRIPTORS: ACHING
DESCRIPTORS: SHARP;SHOOTING;STABBING
DESCRIPTORS: SHARP;SHOOTING
DESCRIPTORS: SHOOTING;SHARP;STABBING

## 2025-06-11 ASSESSMENT — PAIN DESCRIPTION - PAIN TYPE: TYPE: ACUTE PAIN

## 2025-06-11 NOTE — ED NOTES
Bedside and Verbal shift change report given to Stephanie (oncoming nurse) by Capri (offgoing nurse). Report included the following information Nurse Handoff Report, ED SBAR, MAR, and Recent Results.

## 2025-06-11 NOTE — ED PROVIDER NOTES
Psychiatric:         Mood and Affect: Mood normal.         Behavior: Behavior normal.         Thought Content: Thought content normal.         Judgment: Judgment normal.         Diagnostic Study Results     Labs -     Recent Results (from the past 12 hours)   CBC with Auto Differential    Collection Time: 06/11/25  4:27 PM   Result Value Ref Range    WBC 6.5 4.6 - 13.2 K/uL    RBC 4.44 4.20 - 5.30 M/uL    Hemoglobin 12.0 12.0 - 16.0 g/dL    Hematocrit 38.0 35.0 - 45.0 %    MCV 85.6 78.0 - 100.0 FL    MCH 27.0 24.0 - 34.0 PG    MCHC 31.6 31.0 - 37.0 g/dL    RDW 15.1 (H) 11.6 - 14.5 %    Platelets 401 135 - 420 K/uL    MPV 11.0 9.2 - 11.8 FL    Nucleated RBCs 0.0 0  WBC    nRBC 0.00 0.00 - 0.01 K/uL    Neutrophils % 59.5 40.0 - 73.0 %    Lymphocytes % 31.0 21.0 - 52.0 %    Monocytes % 8.1 3.0 - 10.0 %    Eosinophils % 0.8 0.0 - 5.0 %    Basophils % 0.3 0.0 - 2.0 %    Immature Granulocytes % 0.3 0.0 - 0.5 %    Neutrophils Absolute 3.84 1.80 - 8.00 K/UL    Lymphocytes Absolute 2.00 0.90 - 3.60 K/UL    Monocytes Absolute 0.52 0.05 - 1.20 K/UL    Eosinophils Absolute 0.05 0.00 - 0.40 K/UL    Basophils Absolute 0.02 0.00 - 0.10 K/UL    Immature Granulocytes Absolute 0.02 0.00 - 0.04 K/UL    Differential Type AUTOMATED     CMP    Collection Time: 06/11/25  4:27 PM   Result Value Ref Range    Sodium 140 136 - 145 mmol/L    Potassium 3.3 (L) 3.5 - 5.5 mmol/L    Chloride 103 98 - 107 mmol/L    CO2 25 21 - 32 mmol/L    Anion Gap 12 3.0 - 18.0 mmol/L    Glucose 84 74 - 108 mg/dL    BUN 7 6 - 23 MG/DL    Creatinine 0.64 0.6 - 1.3 MG/DL    BUN/Creatinine Ratio 11 (L) 12 - 20      Est, Glom Filt Rate >90 >60 ml/min/1.73m2    Calcium 9.6 8.5 - 10.1 MG/DL    Total Bilirubin 0.4 0.2 - 1.0 MG/DL    ALT 14 10 - 35 U/L    AST 18 10 - 38 U/L    Alk Phosphatase 105 45 - 117 U/L    Total Protein 8.5 (H) 6.4 - 8.2 g/dL    Albumin 3.6 3.4 - 5.0 g/dL    Globulin 4.9 (H) 2.0 - 4.0 g/dL    Albumin/Globulin Ratio 0.7 (L) 0.8 - 1.7

## 2025-06-11 NOTE — ED TRIAGE NOTES
Pt arrived via EMS from home. Pt was coming down the steps side ways when her leg gave out and pt fell. Pt denies striking her head. Pt does not take blood thinners. Pt alert and oriented x4. Pt c/o pain 10/10 on numerical scale. Pain worsens when moved. Pain is located to her lower back and left leg.

## 2025-06-12 VITALS
RESPIRATION RATE: 17 BRPM | TEMPERATURE: 98.8 F | DIASTOLIC BLOOD PRESSURE: 72 MMHG | SYSTOLIC BLOOD PRESSURE: 169 MMHG | HEIGHT: 59 IN | OXYGEN SATURATION: 98 % | BODY MASS INDEX: 29.98 KG/M2 | WEIGHT: 148.7 LBS | HEART RATE: 64 BPM

## 2025-06-12 LAB
EKG ATRIAL RATE: 61 BPM
EKG DIAGNOSIS: NORMAL
EKG P AXIS: 22 DEGREES
EKG P-R INTERVAL: 148 MS
EKG Q-T INTERVAL: 436 MS
EKG QRS DURATION: 84 MS
EKG QTC CALCULATION (BAZETT): 438 MS
EKG R AXIS: 24 DEGREES
EKG T AXIS: 18 DEGREES
EKG VENTRICULAR RATE: 61 BPM

## 2025-06-12 PROCEDURE — 93010 ELECTROCARDIOGRAM REPORT: CPT | Performed by: INTERNAL MEDICINE

## 2025-06-12 RX ORDER — ACETAMINOPHEN 500 MG
1000 TABLET ORAL 4 TIMES DAILY PRN
Qty: 30 TABLET | Refills: 1 | Status: SHIPPED | OUTPATIENT
Start: 2025-06-12

## 2025-06-12 RX ORDER — LIDOCAINE 50 MG/G
1 PATCH TOPICAL DAILY
Qty: 10 PATCH | Refills: 0 | Status: SHIPPED | OUTPATIENT
Start: 2025-06-12 | End: 2025-06-22

## 2025-06-12 RX ORDER — LISINOPRIL AND HYDROCHLOROTHIAZIDE 10; 12.5 MG/1; MG/1
1 TABLET ORAL EVERY MORNING
Qty: 30 TABLET | Refills: 0 | Status: SHIPPED | OUTPATIENT
Start: 2025-06-12

## 2025-06-12 RX ORDER — POTASSIUM CHLORIDE 1500 MG/1
20 TABLET, EXTENDED RELEASE ORAL 2 TIMES DAILY
Qty: 6 TABLET | Refills: 0 | Status: SHIPPED | OUTPATIENT
Start: 2025-06-12

## 2025-06-12 RX ORDER — METHOCARBAMOL 750 MG/1
750 TABLET, FILM COATED ORAL 4 TIMES DAILY
Qty: 40 TABLET | Refills: 0 | Status: SHIPPED | OUTPATIENT
Start: 2025-06-12 | End: 2025-06-22

## 2025-06-12 ASSESSMENT — PAIN - FUNCTIONAL ASSESSMENT: PAIN_FUNCTIONAL_ASSESSMENT: PREVENTS OR INTERFERES SOME ACTIVE ACTIVITIES AND ADLS

## 2025-06-12 ASSESSMENT — PAIN DESCRIPTION - DESCRIPTORS: DESCRIPTORS: ACHING

## 2025-06-12 ASSESSMENT — PAIN SCALES - GENERAL: PAINLEVEL_OUTOF10: 7

## 2025-06-12 ASSESSMENT — PAIN DESCRIPTION - LOCATION: LOCATION: BACK

## 2025-06-12 ASSESSMENT — PAIN DESCRIPTION - ORIENTATION: ORIENTATION: LOWER

## 2025-06-12 NOTE — ED NOTES
8:20 PM Assumed care of the patient at this time. Discussed with MARCELINA Morejon concerning patient Bela Peace, standard discussion of reason for visit, HPI, ROS, PE, and current results available.  Recommendation for obtaining pending duplex study, labs and CT imaging followed by bedside re-evaluation to dispo. Palak Izaguirre PA-C     11:18 PM duplex negative for DVT pr PVL tech, CT negative,   EKG: normal sinus rate 61 no STEMI or acute changes reviewed by myself and the ED attending, troponin negative, K 3.3, will replace PO at home.  Patient was given dose of blood pressure medication in the ED.  Will plan to discharge patient with symptomatic treatment for her leg pain and refill her blood pressure medication.  Close PCP follow-up is encouraged.  I have discussed all of this with the patient at bedside and she is in agreement with this plan. Palak Izaguirre PA-C     Disposition: d/c     Dictation disclaimer:  Please note that this dictation was completed with [employer], the computer voice recognition software.  Quite often unanticipated grammatical, syntax, homophones, and other interpretive errors are inadvertently transcribed by the computer software.  Please disregard these errors.  Please excuse any errors that have escaped final proofreading.       Palak Izaguirre PA-C  06/11/25 [id number]